# Patient Record
Sex: MALE | Race: WHITE | NOT HISPANIC OR LATINO | Employment: OTHER | URBAN - METROPOLITAN AREA
[De-identification: names, ages, dates, MRNs, and addresses within clinical notes are randomized per-mention and may not be internally consistent; named-entity substitution may affect disease eponyms.]

---

## 2017-01-10 DIAGNOSIS — E78.5 HYPERLIPIDEMIA, UNSPECIFIED HYPERLIPIDEMIA TYPE: ICD-10-CM

## 2017-01-10 DIAGNOSIS — I10 BENIGN ESSENTIAL HYPERTENSION: Primary | ICD-10-CM

## 2017-01-10 DIAGNOSIS — R73.9 HYPERGLYCEMIA: ICD-10-CM

## 2017-01-13 LAB
ALBUMIN SERPL-MCNC: 4.6 G/DL (ref 3.5–5.2)
ALBUMIN/GLOB SERPL: 2 G/DL
ALP SERPL-CCNC: 71 U/L (ref 39–117)
ALT SERPL-CCNC: 31 U/L (ref 1–41)
AST SERPL-CCNC: 21 U/L (ref 1–40)
BILIRUB SERPL-MCNC: 0.5 MG/DL (ref 0.1–1.2)
BUN SERPL-MCNC: 13 MG/DL (ref 8–23)
BUN/CREAT SERPL: 13.4 (ref 7–25)
CALCIUM SERPL-MCNC: 9.5 MG/DL (ref 8.6–10.5)
CHLORIDE SERPL-SCNC: 102 MMOL/L (ref 98–107)
CHOLEST SERPL-MCNC: 124 MG/DL (ref 0–200)
CO2 SERPL-SCNC: 28.6 MMOL/L (ref 22–29)
CREAT SERPL-MCNC: 0.97 MG/DL (ref 0.76–1.27)
GLOBULIN SER CALC-MCNC: 2.3 GM/DL
GLUCOSE SERPL-MCNC: 113 MG/DL (ref 65–99)
HBA1C MFR BLD: 5.4 % (ref 4.8–5.6)
HDLC SERPL-MCNC: 26 MG/DL (ref 40–60)
LDLC SERPL CALC-MCNC: 67 MG/DL (ref 0–100)
LDLC/HDLC SERPL: 2.58 {RATIO}
POTASSIUM SERPL-SCNC: 4.3 MMOL/L (ref 3.5–5.2)
PROT SERPL-MCNC: 6.9 G/DL (ref 6–8.5)
SODIUM SERPL-SCNC: 142 MMOL/L (ref 136–145)
TRIGL SERPL-MCNC: 154 MG/DL (ref 0–150)
VLDLC SERPL CALC-MCNC: 30.8 MG/DL (ref 5–40)

## 2017-01-19 ENCOUNTER — OFFICE VISIT (OUTPATIENT)
Dept: FAMILY MEDICINE CLINIC | Facility: CLINIC | Age: 62
End: 2017-01-19

## 2017-01-19 VITALS
DIASTOLIC BLOOD PRESSURE: 90 MMHG | RESPIRATION RATE: 16 BRPM | HEART RATE: 80 BPM | BODY MASS INDEX: 28.88 KG/M2 | OXYGEN SATURATION: 98 % | WEIGHT: 225 LBS | TEMPERATURE: 98 F | HEIGHT: 74 IN | SYSTOLIC BLOOD PRESSURE: 148 MMHG

## 2017-01-19 DIAGNOSIS — I10 BENIGN ESSENTIAL HYPERTENSION: Primary | ICD-10-CM

## 2017-01-19 DIAGNOSIS — E78.5 HYPERLIPIDEMIA, UNSPECIFIED HYPERLIPIDEMIA TYPE: ICD-10-CM

## 2017-01-19 PROCEDURE — 99213 OFFICE O/P EST LOW 20 MIN: CPT | Performed by: INTERNAL MEDICINE

## 2017-02-02 RX ORDER — AMLODIPINE BESYLATE 5 MG/1
5 TABLET ORAL DAILY
Qty: 90 TABLET | Refills: 1 | Status: SHIPPED | OUTPATIENT
Start: 2017-02-02 | End: 2018-02-17 | Stop reason: SDUPTHER

## 2017-02-02 RX ORDER — METOPROLOL SUCCINATE 50 MG/1
50 TABLET, EXTENDED RELEASE ORAL DAILY
Qty: 90 TABLET | Refills: 1 | Status: SHIPPED | OUTPATIENT
Start: 2017-02-02 | End: 2017-10-17 | Stop reason: SDUPTHER

## 2017-02-02 RX ORDER — SIMVASTATIN 40 MG
40 TABLET ORAL NIGHTLY
Qty: 90 TABLET | Refills: 1 | Status: SHIPPED | OUTPATIENT
Start: 2017-02-02 | End: 2018-02-17 | Stop reason: SDUPTHER

## 2017-02-02 RX ORDER — LISINOPRIL 10 MG/1
10 TABLET ORAL DAILY
Qty: 90 TABLET | Refills: 1 | Status: SHIPPED | OUTPATIENT
Start: 2017-02-02 | End: 2017-05-05 | Stop reason: SDUPTHER

## 2017-03-01 NOTE — PROGRESS NOTES
Subjective   Jacob Miller is a 61 y.o. male. Patient is here today for   Chief Complaint   Patient presents with   • Hypertension     lab f/u   • Hyperlipidemia          Vitals:    01/19/17 1550   BP: 148/90   Pulse: 80   Resp: 16   Temp: 98 °F (36.7 °C)   SpO2: 98%       Past Medical History   Diagnosis Date   • Hyperlipidemia    • Hypertension       No Known Allergies   Social History     Social History   • Marital status:      Spouse name: N/A   • Number of children: N/A   • Years of education: N/A     Occupational History   • Not on file.     Social History Main Topics   • Smoking status: Never Smoker   • Smokeless tobacco: Not on file   • Alcohol use Yes   • Drug use: Not on file   • Sexual activity: Not on file     Other Topics Concern   • Not on file     Social History Narrative        Current Outpatient Prescriptions:   •  amLODIPine (NORVASC) 5 MG tablet, Take 1 tablet by mouth Daily., Disp: 90 tablet, Rfl: 1  •  lisinopril (PRINIVIL,ZESTRIL) 10 MG tablet, Take 1 tablet by mouth Daily., Disp: 90 tablet, Rfl: 1  •  metoprolol succinate XL (TOPROL-XL) 50 MG 24 hr tablet, Take 1 tablet by mouth Daily., Disp: 90 tablet, Rfl: 1  •  simvastatin (ZOCOR) 40 MG tablet, Take 1 tablet by mouth Every Night., Disp: 90 tablet, Rfl: 1     Objective     HPI Comments: He is here to follow-up with hypertension and hypercholesterolemia.    He claims to feel well.    Hypertension     Hyperlipidemia          Review of Systems   Constitutional: Negative.    HENT: Negative.    Respiratory: Negative.    Cardiovascular: Negative.    Psychiatric/Behavioral: Negative.        Physical Exam   Constitutional: He is oriented to person, place, and time. He appears well-developed and well-nourished.   HENT:   Head: Normocephalic and atraumatic.   Pulmonary/Chest: Effort normal.   Neurological: He is alert and oriented to person, place, and time.   Psychiatric: He has a normal mood and affect. His behavior is normal.   Nursing  note and vitals reviewed.        Problem List Items Addressed This Visit        Cardiovascular and Mediastinum    Benign essential hypertension - Primary    Hyperlipidemia            PLAN  His hypercholesterolemia.  BP fairly well-controlled.  He does have a little bit of an elevation in his triglycerides and he is a higher than normal fasting glucose.  He is obese.  Of urged him to do his best to lose weight via regular aerobic activity decreased caloric intake.    I would like recheck some lab work in about 6 months to include a conference metabolic panel, hemoglobin A 1C, urinalysis, lipid profile, he should get a PSA checked once yearly for hasn't been checked in the past year.      Return in about 2 months (around 3/19/2017).

## 2017-03-16 ENCOUNTER — OFFICE VISIT (OUTPATIENT)
Dept: FAMILY MEDICINE CLINIC | Facility: CLINIC | Age: 62
End: 2017-03-16

## 2017-03-16 VITALS
RESPIRATION RATE: 16 BRPM | WEIGHT: 225 LBS | HEIGHT: 74 IN | BODY MASS INDEX: 28.88 KG/M2 | SYSTOLIC BLOOD PRESSURE: 148 MMHG | HEART RATE: 80 BPM | TEMPERATURE: 98.2 F | OXYGEN SATURATION: 98 % | DIASTOLIC BLOOD PRESSURE: 84 MMHG

## 2017-03-16 DIAGNOSIS — I10 BENIGN ESSENTIAL HYPERTENSION: Primary | ICD-10-CM

## 2017-03-16 PROCEDURE — 99213 OFFICE O/P EST LOW 20 MIN: CPT | Performed by: INTERNAL MEDICINE

## 2017-03-16 NOTE — PROGRESS NOTES
Subjective   Jacob Miller is a 61 y.o. male. Patient is here today for   Chief Complaint   Patient presents with   • Hypertension     2 month f/u          Vitals:    03/16/17 1007   BP: 148/84   Pulse: 80   Resp: 16   Temp: 98.2 °F (36.8 °C)   SpO2: 98%       Past Medical History   Diagnosis Date   • Hyperlipidemia    • Hypertension       No Known Allergies   Social History     Social History   • Marital status:      Spouse name: N/A   • Number of children: N/A   • Years of education: N/A     Occupational History   • Not on file.     Social History Main Topics   • Smoking status: Never Smoker   • Smokeless tobacco: Not on file   • Alcohol use Yes   • Drug use: Not on file   • Sexual activity: Not on file     Other Topics Concern   • Not on file     Social History Narrative        Current Outpatient Prescriptions:   •  amLODIPine (NORVASC) 5 MG tablet, Take 1 tablet by mouth Daily., Disp: 90 tablet, Rfl: 1  •  lisinopril (PRINIVIL,ZESTRIL) 10 MG tablet, Take 1 tablet by mouth Daily., Disp: 90 tablet, Rfl: 1  •  metoprolol succinate XL (TOPROL-XL) 50 MG 24 hr tablet, Take 1 tablet by mouth Daily., Disp: 90 tablet, Rfl: 1  •  simvastatin (ZOCOR) 40 MG tablet, Take 1 tablet by mouth Every Night., Disp: 90 tablet, Rfl: 1     Objective     HPI Comments: He is here to follow-up on hypertension.    His current medications include amlodipine 2.5 mg once daily, lisinopril 20 mg once daily, metoprolol XL 50 mg once daily.    He has obstructive sleep apnea and is compliant with use of CPAP.    He claims to feel well.    Hypertension          Review of Systems   Constitutional: Negative.    HENT: Negative.    Respiratory: Negative.    Cardiovascular: Negative.    Genitourinary: Negative.    Hematological: Negative.        Physical Exam   Constitutional: He is oriented to person, place, and time.   HENT:   Head: Normocephalic and atraumatic.   Pulmonary/Chest: Effort normal.   Neurological: He is alert and oriented to  person, place, and time.   Psychiatric: He has a normal mood and affect. His behavior is normal.   Nursing note and vitals reviewed.        Problem List Items Addressed This Visit        Cardiovascular and Mediastinum    Benign essential hypertension - Primary            PLAN  His hypertension needs to be a bit better controlled.  I asked him to increase his amlodipine to 5 mg daily.    He will continue to take lisinopril 20 mg daily.    Follow-up in one or 2 months for blood pressure check.        I asked him to follow-up in about 6 months for a comprehensive physical exam.      No Follow-up on file.

## 2017-04-17 ENCOUNTER — OFFICE VISIT (OUTPATIENT)
Dept: FAMILY MEDICINE CLINIC | Facility: CLINIC | Age: 62
End: 2017-04-17

## 2017-04-17 VITALS
HEART RATE: 70 BPM | HEIGHT: 74 IN | OXYGEN SATURATION: 99 % | DIASTOLIC BLOOD PRESSURE: 72 MMHG | RESPIRATION RATE: 16 BRPM | SYSTOLIC BLOOD PRESSURE: 132 MMHG | BODY MASS INDEX: 29.26 KG/M2 | WEIGHT: 228 LBS

## 2017-04-17 DIAGNOSIS — I10 BENIGN ESSENTIAL HYPERTENSION: Primary | ICD-10-CM

## 2017-04-17 PROCEDURE — 99213 OFFICE O/P EST LOW 20 MIN: CPT | Performed by: INTERNAL MEDICINE

## 2017-05-05 RX ORDER — LISINOPRIL 20 MG/1
20 TABLET ORAL DAILY
Qty: 90 TABLET | Refills: 1 | Status: SHIPPED | OUTPATIENT
Start: 2017-05-05 | End: 2017-06-20 | Stop reason: SDUPTHER

## 2017-06-07 DIAGNOSIS — Z11.59 NEED FOR HEPATITIS C SCREENING TEST: ICD-10-CM

## 2017-06-07 DIAGNOSIS — Z12.5 ENCOUNTER FOR SCREENING FOR MALIGNANT NEOPLASM OF PROSTATE: ICD-10-CM

## 2017-06-07 DIAGNOSIS — Z00.00 ROUTINE HEALTH MAINTENANCE: Primary | ICD-10-CM

## 2017-06-07 DIAGNOSIS — R73.9 HYPERGLYCEMIA: ICD-10-CM

## 2017-06-13 ENCOUNTER — CLINICAL SUPPORT (OUTPATIENT)
Dept: FAMILY MEDICINE CLINIC | Facility: CLINIC | Age: 62
End: 2017-06-13

## 2017-06-13 DIAGNOSIS — Z00.00 ROUTINE HEALTH MAINTENANCE: Primary | ICD-10-CM

## 2017-06-13 PROCEDURE — 85025 COMPLETE CBC W/AUTO DIFF WBC: CPT | Performed by: INTERNAL MEDICINE

## 2017-06-13 PROCEDURE — 71020 XR CHEST PA AND LATERAL: CPT | Performed by: INTERNAL MEDICINE

## 2017-06-13 PROCEDURE — 93000 ELECTROCARDIOGRAM COMPLETE: CPT | Performed by: INTERNAL MEDICINE

## 2017-06-14 LAB
ALBUMIN SERPL-MCNC: 4.4 G/DL (ref 3.6–4.8)
ALBUMIN/GLOB SERPL: 1.8 {RATIO} (ref 1.2–2.2)
ALP SERPL-CCNC: 70 IU/L (ref 39–117)
ALT SERPL-CCNC: 33 IU/L (ref 0–44)
APPEARANCE UR: CLEAR
AST SERPL-CCNC: 23 IU/L (ref 0–40)
BILIRUB SERPL-MCNC: 0.6 MG/DL (ref 0–1.2)
BILIRUB UR QL STRIP: NEGATIVE
BUN SERPL-MCNC: 15 MG/DL (ref 8–27)
BUN/CREAT SERPL: 17 (ref 10–24)
CALCIUM SERPL-MCNC: 9.3 MG/DL (ref 8.6–10.2)
CHLORIDE SERPL-SCNC: 100 MMOL/L (ref 96–106)
CHOLEST SERPL-MCNC: 123 MG/DL (ref 100–199)
CO2 SERPL-SCNC: 25 MMOL/L (ref 18–29)
COLOR UR: YELLOW
CREAT SERPL-MCNC: 0.9 MG/DL (ref 0.76–1.27)
GLOBULIN SER CALC-MCNC: 2.5 G/DL (ref 1.5–4.5)
GLUCOSE SERPL-MCNC: 100 MG/DL (ref 65–99)
GLUCOSE UR QL: NEGATIVE
HBA1C MFR BLD: 5.8 % (ref 4.8–5.6)
HCV AB S/CO SERPL IA: <0.1 S/CO RATIO (ref 0–0.9)
HCV AB SERPL QL IA: NORMAL
HDLC SERPL-MCNC: 27 MG/DL
HGB UR QL STRIP: NEGATIVE
KETONES UR QL STRIP: NEGATIVE
LDLC SERPL CALC-MCNC: 62 MG/DL (ref 0–99)
LDLC/HDLC SERPL: 2.3 RATIO UNITS (ref 0–3.6)
LEUKOCYTE ESTERASE UR QL STRIP: NEGATIVE
MICRO URNS: ABNORMAL
NITRITE UR QL STRIP: NEGATIVE
PH UR STRIP: 8 [PH] (ref 5–7.5)
POTASSIUM SERPL-SCNC: 3.8 MMOL/L (ref 3.5–5.2)
PROT SERPL-MCNC: 6.9 G/DL (ref 6–8.5)
PROT UR QL STRIP: ABNORMAL
PSA SERPL-MCNC: 1.7 NG/ML (ref 0–4)
SODIUM SERPL-SCNC: 140 MMOL/L (ref 134–144)
SP GR UR: 1.02 (ref 1–1.03)
TRIGL SERPL-MCNC: 171 MG/DL (ref 0–149)
UROBILINOGEN UR STRIP-MCNC: 0.2 MG/DL (ref 0.2–1)
VLDLC SERPL CALC-MCNC: 34 MG/DL (ref 5–40)

## 2017-06-20 ENCOUNTER — OFFICE VISIT (OUTPATIENT)
Dept: FAMILY MEDICINE CLINIC | Facility: CLINIC | Age: 62
End: 2017-06-20

## 2017-06-20 VITALS
DIASTOLIC BLOOD PRESSURE: 80 MMHG | RESPIRATION RATE: 16 BRPM | SYSTOLIC BLOOD PRESSURE: 130 MMHG | BODY MASS INDEX: 29.26 KG/M2 | OXYGEN SATURATION: 98 % | WEIGHT: 228 LBS | HEIGHT: 74 IN | HEART RATE: 65 BPM | TEMPERATURE: 98 F

## 2017-06-20 DIAGNOSIS — I10 BENIGN ESSENTIAL HYPERTENSION: ICD-10-CM

## 2017-06-20 DIAGNOSIS — E78.5 HYPERLIPIDEMIA, UNSPECIFIED HYPERLIPIDEMIA TYPE: ICD-10-CM

## 2017-06-20 DIAGNOSIS — N40.0 BENIGN NON-NODULAR PROSTATIC HYPERPLASIA WITHOUT LOWER URINARY TRACT SYMPTOMS: ICD-10-CM

## 2017-06-20 DIAGNOSIS — Z00.00 WELL ADULT EXAM: ICD-10-CM

## 2017-06-20 DIAGNOSIS — Z12.11 SCREEN FOR COLON CANCER: Primary | ICD-10-CM

## 2017-06-20 DIAGNOSIS — R73.9 HYPERGLYCEMIA: ICD-10-CM

## 2017-06-20 DIAGNOSIS — G47.33 OBSTRUCTIVE SLEEP APNEA: ICD-10-CM

## 2017-06-20 LAB — HEMOCCULT STL QL IA: NEGATIVE

## 2017-06-20 PROCEDURE — 82274 ASSAY TEST FOR BLOOD FECAL: CPT | Performed by: INTERNAL MEDICINE

## 2017-06-20 PROCEDURE — 99396 PREV VISIT EST AGE 40-64: CPT | Performed by: INTERNAL MEDICINE

## 2017-06-20 PROCEDURE — 71020 XR CHEST PA AND LATERAL: CPT | Performed by: INTERNAL MEDICINE

## 2017-06-20 RX ORDER — LISINOPRIL 20 MG/1
20 TABLET ORAL DAILY
Qty: 90 TABLET | Refills: 1 | Status: SHIPPED | OUTPATIENT
Start: 2017-06-20 | End: 2018-09-18 | Stop reason: SDUPTHER

## 2017-06-20 NOTE — PROGRESS NOTES
Subjective   Jacob Miller is a 61 y.o. male. Patient is here today for   Chief Complaint   Patient presents with   • Annual Exam     physical           Vitals:    06/20/17 1046   BP: 130/80   Pulse: 65   Resp: 16   Temp: 98 °F (36.7 °C)   SpO2: 98%       The following portions of the patient's history were reviewed and updated as appropriate: allergies, current medications, past family history, past medical history, past social history, past surgical history and problem list.    Past Medical History:   Diagnosis Date   • Hyperlipidemia    • Hypertension       No Known Allergies   Social History     Social History   • Marital status:      Spouse name: N/A   • Number of children: N/A   • Years of education: N/A     Occupational History   • Not on file.     Social History Main Topics   • Smoking status: Never Smoker   • Smokeless tobacco: Not on file   • Alcohol use Yes   • Drug use: Not on file   • Sexual activity: Not on file     Other Topics Concern   • Not on file     Social History Narrative        Current Outpatient Prescriptions:   •  amLODIPine (NORVASC) 5 MG tablet, Take 1 tablet by mouth Daily., Disp: 90 tablet, Rfl: 1  •  metoprolol succinate XL (TOPROL-XL) 50 MG 24 hr tablet, Take 1 tablet by mouth Daily., Disp: 90 tablet, Rfl: 1  •  simvastatin (ZOCOR) 40 MG tablet, Take 1 tablet by mouth Every Night., Disp: 90 tablet, Rfl: 1  •  lisinopril (PRINIVIL,ZESTRIL) 20 MG tablet, Take 1 tablet by mouth Daily., Disp: 90 tablet, Rfl: 1     EKG  ECG Report  His electrocardiogram showed normal sinus rhythm with regular rate.  There were no ST or T wave changes suggestive of ischemia.    His PA and lateral chest x-ray showed no acute or chronic disease.  Objective   HPI Comments: Here today for a comprehensive physical exam.    He tells me that he feels well.    He has obstructive sleep apnea.  He has been compliant with use of CPAP device since 1998.             Jacob Miller 61 y.o. male who presents  for an Annual Wellness Visit.  he has a history of   Patient Active Problem List   Diagnosis   • Benign essential hypertension   • Hyperlipidemia   • Well adult exam   • Obstructive sleep apnea   • Hyperglycemia   • Actinic keratosis   • Benign non-nodular prostatic hyperplasia without lower urinary tract symptoms   .  he has been doing well with new interval problems.  Labs results discussed in detail with the patient.  Plan to update vaccines if needed today.        Lab Results (most recent)     Procedure Component Value Units Date/Time    POC FECAL OCCULT BLOOD BY IMMUNOASSAY [[id number]]  (Normal) Collected:  06/20/17 1154    Specimen:  Stool Updated:  06/20/17 1155     POC OCCULT BLOOD BY IMMUNOASSAY Negative            Review of Systems   Constitutional: Negative.    HENT: Negative.    Eyes: Negative.    Respiratory: Negative.    Cardiovascular: Negative.    Gastrointestinal: Negative.    Endocrine: Negative.    Genitourinary: Negative.    Musculoskeletal: Negative.    Skin: Negative.    Allergic/Immunologic: Negative.    Neurological: Negative.    Hematological: Negative.    Psychiatric/Behavioral: Negative.        Physical Exam   Constitutional: He is oriented to person, place, and time. He appears well-developed and well-nourished. No distress.   Pleasant, neatly groomed, moderately overweight with BMI 29.   HENT:   Head: Normocephalic and atraumatic.   Right Ear: External ear normal.   Left Ear: External ear normal.   Nose: Nose normal.   Mouth/Throat: Oropharynx is clear and moist. No oropharyngeal exudate.   Eyes: Conjunctivae and EOM are normal. Pupils are equal, round, and reactive to light. Right eye exhibits no discharge. Left eye exhibits no discharge. No scleral icterus.   Neck: Normal range of motion. Neck supple. No JVD present. No tracheal deviation present. No thyromegaly present.   Cardiovascular: Normal rate, regular rhythm, normal heart sounds and intact distal pulses.  Exam reveals no gallop  and no friction rub.    No murmur heard.  Pulmonary/Chest: Effort normal and breath sounds normal. No stridor. No respiratory distress. He has no wheezes. He has no rales. He exhibits no tenderness.   Abdominal: Soft. Bowel sounds are normal. He exhibits no distension and no mass. There is no tenderness. There is no rebound and no guarding. No hernia.   Genitourinary: Rectum normal and penis normal. Rectal exam shows guaiac negative stool. No penile tenderness.   Genitourinary Comments: His prostate is symmetrically enlarged.  There is no induration, there is no nodule.   Musculoskeletal: Normal range of motion. He exhibits no edema, tenderness or deformity.   Lymphadenopathy:     He has no cervical adenopathy.   Neurological: He is alert and oriented to person, place, and time. He has normal reflexes. He displays normal reflexes. No cranial nerve deficit. He exhibits normal muscle tone. Coordination normal.   Skin: Skin is warm and dry. No rash noted. He is not diaphoretic. No erythema. No pallor.   He has some solar lentigines at the temples and on his face.   Psychiatric: He has a normal mood and affect. His behavior is normal. Judgment and thought content normal.   Nursing note and vitals reviewed.      ASSESSMENT       Problem List Items Addressed This Visit        Cardiovascular and Mediastinum    Benign essential hypertension    Hyperlipidemia       Respiratory    Obstructive sleep apnea       Genitourinary    Benign non-nodular prostatic hyperplasia without lower urinary tract symptoms       Other    Well adult exam    Relevant Orders    XR Chest PA & Lateral (Completed)    Hyperglycemia      Other Visit Diagnoses     Screen for colon cancer    -  Primary    Relevant Orders    POC FECAL OCCULT BLOOD BY IMMUNOASSAY (Completed)          PLAN  He and I reviewed his labs.  His hypertension is hypercholesterolemia are well controlled.  Next he has BPH and is asymptomatic.    He is a little hyperglycemic.   Likewise, he is a bit overweight.  Of urged him to do his best to lose weight via regular aerobic exercise (recommended walking), and some moderate caloric restriction.    He does have obstructive sleep apnea as and is compliant with use of CPAP.    I like to have him back in about 6 months to recheck a hemoglobin A1c and a comprehensive metabolic panel and a lipid profile.  There are no Patient Instructions on file for this visit.    No Follow-up on file.

## 2017-10-17 RX ORDER — METOPROLOL SUCCINATE 50 MG/1
TABLET, EXTENDED RELEASE ORAL
Qty: 30 TABLET | Refills: 0 | Status: SHIPPED | OUTPATIENT
Start: 2017-10-17 | End: 2017-11-28 | Stop reason: SDUPTHER

## 2017-11-28 RX ORDER — METOPROLOL SUCCINATE 50 MG/1
TABLET, EXTENDED RELEASE ORAL
Qty: 30 TABLET | Refills: 0 | Status: SHIPPED | OUTPATIENT
Start: 2017-11-28 | End: 2018-01-05 | Stop reason: SDUPTHER

## 2017-12-11 ENCOUNTER — OFFICE VISIT (OUTPATIENT)
Dept: FAMILY MEDICINE CLINIC | Facility: CLINIC | Age: 62
End: 2017-12-11

## 2017-12-11 VITALS
DIASTOLIC BLOOD PRESSURE: 82 MMHG | WEIGHT: 229.6 LBS | TEMPERATURE: 98.4 F | BODY MASS INDEX: 29.46 KG/M2 | HEIGHT: 74 IN | HEART RATE: 83 BPM | OXYGEN SATURATION: 99 % | RESPIRATION RATE: 16 BRPM | SYSTOLIC BLOOD PRESSURE: 168 MMHG

## 2017-12-11 DIAGNOSIS — J06.9 ACUTE URI: Primary | ICD-10-CM

## 2017-12-11 PROCEDURE — 99213 OFFICE O/P EST LOW 20 MIN: CPT | Performed by: NURSE PRACTITIONER

## 2017-12-11 RX ORDER — INFLUENZA A VIRUS A/MICHIGAN/45/2015 X-275 (H1N1) ANTIGEN (FORMALDEHYDE INACTIVATED), INFLUENZA A VIRUS A/SINGAPORE/INFIMH-16-0019/2016 IVR-186 (H3N2) ANTIGEN (FORMALDEHYDE INACTIVATED), INFLUENZA B VIRUS B/PHUKET/3073/2013 ANTIGEN (FORMALDEHYDE INACTIVATED), AND INFLUENZA B VIRUS B/MARYLAND/15/2016 BX-69A ANTIGEN (FORMALDEHYDE INACTIVATED) 15; 15; 15; 15 UG/.5ML; UG/.5ML; UG/.5ML; UG/.5ML
INJECTION, SUSPENSION INTRAMUSCULAR
COMMUNITY
Start: 2017-11-13 | End: 2018-01-11

## 2017-12-11 RX ORDER — PROMETHAZINE HYDROCHLORIDE AND CODEINE PHOSPHATE 6.25; 1 MG/5ML; MG/5ML
5 SYRUP ORAL EVERY 4 HOURS PRN
Qty: 180 ML | Refills: 0 | Status: SHIPPED | OUTPATIENT
Start: 2017-12-11 | End: 2018-01-11

## 2017-12-11 NOTE — PROGRESS NOTES
Subjective   Jacob Miller is a 62 y.o. male.   Chief Complaint   Patient presents with   • Nasal Congestion     pt states been going on for several days 12/08/17    • sneezing   • Cough   • chest congestion     Vitals:    12/11/17 1133   BP: 168/82   Pulse: 83   Resp: 16   Temp: 98.4 °F (36.9 °C)   SpO2: 99%     No LMP for male patient.    History of Present Illness  Jacob is here for an acute visit. He c/o cough, sneezing, sinus pressure, and dry hacking cough for 5 days. He has taken coricedin and mucinex D with some relief. He denies fever, chills or body aches. He reports that his cough is keeping him up at night     The following portions of the patient's history were reviewed and updated as appropriate: allergies, current medications, past family history, past medical history, past social history, past surgical history and problem list.    Review of Systems   Constitutional: Negative for chills, fatigue and fever.   HENT: Positive for congestion, postnasal drip, rhinorrhea, sinus pressure, sneezing and sore throat.    Respiratory: Positive for cough. Negative for apnea, shortness of breath and wheezing.    Cardiovascular: Negative.        Objective   Physical Exam   Constitutional: Vital signs are normal. He appears well-developed and well-nourished. He does not appear ill. No distress.   HENT:   Right Ear: Tympanic membrane and ear canal normal.   Left Ear: Tympanic membrane and ear canal normal.   Nose: Mucosal edema and rhinorrhea present. Right sinus exhibits no maxillary sinus tenderness and no frontal sinus tenderness. Left sinus exhibits no maxillary sinus tenderness and no frontal sinus tenderness.   Mouth/Throat: Uvula is midline. Posterior oropharyngeal erythema present.   Cardiovascular: Normal rate, regular rhythm and normal heart sounds.    Pulmonary/Chest: Effort normal and breath sounds normal.   Neurological: He is alert.   Skin: Skin is warm and dry.       Assessment/Plan   Jacob was  seen today for nasal congestion, sneezing, cough and chest congestion.    Diagnoses and all orders for this visit:    Acute URI    Other orders  -     promethazine-codeine (PHENERGAN with CODEINE) 6.25-10 MG/5ML syrup; Take 5 mL by mouth Every 4 (Four) Hours As Needed for Cough.      Symptom treatment for now   Rest and fluids  Suggest flonase or nasacort for now  Can hold on mucinex if cough is nonproductive and no chest congestion   Tylenol or motrin   Avoid second hand smoke and allergens   No driving while taking promethazine with codeine   Throat lozenges, humidifier, vicks vapor rub as needed  Follow up if your symptoms persist. I asked him to call me if he was no better by Friday and I would call him in a rx for antibiotics  He wants to visit his new grand baby and I asked him to avoid being around them for now until his symptoms resolve

## 2017-12-19 ENCOUNTER — TELEPHONE (OUTPATIENT)
Dept: FAMILY MEDICINE CLINIC | Facility: CLINIC | Age: 62
End: 2017-12-19

## 2017-12-19 RX ORDER — AZITHROMYCIN 250 MG/1
TABLET, FILM COATED ORAL
Qty: 6 TABLET | Refills: 0 | Status: SHIPPED | OUTPATIENT
Start: 2017-12-19 | End: 2018-01-11

## 2017-12-19 NOTE — TELEPHONE ENCOUNTER
Please call him and let him know that I called him in a zpak  Follow up if no better     ----- Message from Salome Mathur sent at 12/19/2017  8:32 AM EST -----  Pt wanting a antibiotic called in still having chest congestion     Pharmacy   [address]     Please call pt with any questions   [phone]

## 2018-01-02 DIAGNOSIS — I10 BENIGN ESSENTIAL HYPERTENSION: Primary | ICD-10-CM

## 2018-01-02 DIAGNOSIS — R73.9 HYPERGLYCEMIA: ICD-10-CM

## 2018-01-02 DIAGNOSIS — E78.5 HYPERLIPIDEMIA, UNSPECIFIED HYPERLIPIDEMIA TYPE: ICD-10-CM

## 2018-01-05 LAB
ALBUMIN SERPL-MCNC: 4.4 G/DL (ref 3.5–5.2)
ALBUMIN/GLOB SERPL: 1.6 G/DL
ALP SERPL-CCNC: 69 U/L (ref 39–117)
ALT SERPL-CCNC: 30 U/L (ref 1–41)
AST SERPL-CCNC: 19 U/L (ref 1–40)
BASOPHILS # BLD AUTO: 0 10*3/MM3 (ref 0–0.2)
BASOPHILS NFR BLD AUTO: 0 % (ref 0–1.5)
BILIRUB SERPL-MCNC: 0.6 MG/DL (ref 0.1–1.2)
BUN SERPL-MCNC: 14 MG/DL (ref 8–23)
BUN/CREAT SERPL: 15.6 (ref 7–25)
CALCIUM SERPL-MCNC: 9.2 MG/DL (ref 8.6–10.5)
CHLORIDE SERPL-SCNC: 103 MMOL/L (ref 98–107)
CHOLEST SERPL-MCNC: 127 MG/DL (ref 0–200)
CO2 SERPL-SCNC: 30.7 MMOL/L (ref 22–29)
CREAT SERPL-MCNC: 0.9 MG/DL (ref 0.76–1.27)
EOSINOPHIL # BLD AUTO: 0.1 10*3/MM3 (ref 0–0.7)
EOSINOPHIL NFR BLD AUTO: 2.5 % (ref 0.3–6.2)
ERYTHROCYTE [DISTWIDTH] IN BLOOD BY AUTOMATED COUNT: 15.4 % (ref 11.5–14.5)
GLOBULIN SER CALC-MCNC: 2.8 GM/DL
GLUCOSE SERPL-MCNC: 105 MG/DL (ref 65–99)
HBA1C MFR BLD: 5.8 % (ref 4.8–5.6)
HCT VFR BLD AUTO: 44 % (ref 40.4–52.2)
HDLC SERPL-MCNC: 29 MG/DL (ref 40–60)
HGB BLD-MCNC: 14.3 G/DL (ref 13.7–17.6)
IMM GRANULOCYTES # BLD: 0 10*3/MM3 (ref 0–0.03)
IMM GRANULOCYTES NFR BLD: 0 % (ref 0–0.5)
LDLC SERPL CALC-MCNC: 70 MG/DL (ref 0–100)
LDLC/HDLC SERPL: 2.41 {RATIO}
LYMPHOCYTES # BLD AUTO: 1.27 10*3/MM3 (ref 0.9–4.8)
LYMPHOCYTES NFR BLD AUTO: 31.4 % (ref 19.6–45.3)
MCH RBC QN AUTO: 29.5 PG (ref 27–32.7)
MCHC RBC AUTO-ENTMCNC: 32.5 G/DL (ref 32.6–36.4)
MCV RBC AUTO: 90.7 FL (ref 79.8–96.2)
MONOCYTES # BLD AUTO: 0.75 10*3/MM3 (ref 0.2–1.2)
MONOCYTES NFR BLD AUTO: 18.5 % (ref 5–12)
NEUTROPHILS # BLD AUTO: 1.93 10*3/MM3 (ref 1.9–8.1)
NEUTROPHILS NFR BLD AUTO: 47.6 % (ref 42.7–76)
PLATELET # BLD AUTO: 196 10*3/MM3 (ref 140–500)
POTASSIUM SERPL-SCNC: 4.4 MMOL/L (ref 3.5–5.2)
PROT SERPL-MCNC: 7.2 G/DL (ref 6–8.5)
RBC # BLD AUTO: 4.85 10*6/MM3 (ref 4.6–6)
SODIUM SERPL-SCNC: 144 MMOL/L (ref 136–145)
TRIGL SERPL-MCNC: 141 MG/DL (ref 0–150)
VLDLC SERPL CALC-MCNC: 28.2 MG/DL (ref 5–40)
WBC # BLD AUTO: 4.05 10*3/MM3 (ref 4.5–10.7)

## 2018-01-05 RX ORDER — METOPROLOL SUCCINATE 50 MG/1
TABLET, EXTENDED RELEASE ORAL
Qty: 30 TABLET | Refills: 11 | Status: SHIPPED | OUTPATIENT
Start: 2018-01-05 | End: 2019-01-14 | Stop reason: SDUPTHER

## 2018-01-11 ENCOUNTER — OFFICE VISIT (OUTPATIENT)
Dept: FAMILY MEDICINE CLINIC | Facility: CLINIC | Age: 63
End: 2018-01-11

## 2018-01-11 VITALS
RESPIRATION RATE: 17 BRPM | DIASTOLIC BLOOD PRESSURE: 80 MMHG | SYSTOLIC BLOOD PRESSURE: 140 MMHG | HEIGHT: 74 IN | WEIGHT: 229 LBS | BODY MASS INDEX: 29.39 KG/M2 | OXYGEN SATURATION: 100 % | HEART RATE: 69 BPM

## 2018-01-11 DIAGNOSIS — M25.562 PAIN IN BOTH KNEES, UNSPECIFIED CHRONICITY: Primary | ICD-10-CM

## 2018-01-11 DIAGNOSIS — E78.5 HYPERLIPIDEMIA, UNSPECIFIED HYPERLIPIDEMIA TYPE: ICD-10-CM

## 2018-01-11 DIAGNOSIS — R73.9 HYPERGLYCEMIA: ICD-10-CM

## 2018-01-11 DIAGNOSIS — M25.561 PAIN IN BOTH KNEES, UNSPECIFIED CHRONICITY: Primary | ICD-10-CM

## 2018-01-11 DIAGNOSIS — I10 BENIGN ESSENTIAL HYPERTENSION: ICD-10-CM

## 2018-01-11 DIAGNOSIS — N40.0 BENIGN NON-NODULAR PROSTATIC HYPERPLASIA WITHOUT LOWER URINARY TRACT SYMPTOMS: ICD-10-CM

## 2018-01-11 DIAGNOSIS — M17.0 PRIMARY OSTEOARTHRITIS OF BOTH KNEES: ICD-10-CM

## 2018-01-11 PROCEDURE — 99214 OFFICE O/P EST MOD 30 MIN: CPT | Performed by: INTERNAL MEDICINE

## 2018-01-11 RX ORDER — DICLOFENAC SODIUM 75 MG/1
75 TABLET, DELAYED RELEASE ORAL 2 TIMES DAILY PRN
Qty: 60 TABLET | Refills: 2 | Status: SHIPPED | OUTPATIENT
Start: 2018-01-11 | End: 2018-09-18 | Stop reason: SDUPTHER

## 2018-01-11 RX ORDER — TOLTERODINE 2 MG/1
2 CAPSULE, EXTENDED RELEASE ORAL DAILY
Qty: 30 CAPSULE | Refills: 1 | Status: SHIPPED | OUTPATIENT
Start: 2018-01-11 | End: 2018-07-26

## 2018-01-14 PROBLEM — M17.0 OSTEOARTHRITIS OF BOTH KNEES: Status: ACTIVE | Noted: 2018-01-14

## 2018-01-14 NOTE — PROGRESS NOTES
Subjective   Jacob Miller is a 62 y.o. male. Patient is here today for   Chief Complaint   Patient presents with   • Hypertension   • Knee Pain     left   • Urine Leakage          Vitals:    01/11/18 1006   BP: 140/80   Pulse: 69   Resp: 17   SpO2: 100%       Past Medical History:   Diagnosis Date   • Hyperlipidemia    • Hypertension       No Known Allergies   Social History     Social History   • Marital status:      Spouse name: N/A   • Number of children: N/A   • Years of education: N/A     Occupational History   • Not on file.     Social History Main Topics   • Smoking status: Never Smoker   • Smokeless tobacco: Never Used   • Alcohol use Yes   • Drug use: Not on file   • Sexual activity: Not on file     Other Topics Concern   • Not on file     Social History Narrative        Current Outpatient Prescriptions:   •  amLODIPine (NORVASC) 5 MG tablet, Take 1 tablet by mouth Daily., Disp: 90 tablet, Rfl: 1  •  diclofenac (VOLTAREN) 75 MG EC tablet, Take 1 tablet by mouth 2 (Two) Times a Day As Needed (pain)., Disp: 60 tablet, Rfl: 2  •  lisinopril (PRINIVIL,ZESTRIL) 20 MG tablet, Take 1 tablet by mouth Daily., Disp: 90 tablet, Rfl: 1  •  metoprolol succinate XL (TOPROL-XL) 50 MG 24 hr tablet, TAKE ONE TABLET BY MOUTH DAILY, Disp: 30 tablet, Rfl: 11  •  simvastatin (ZOCOR) 40 MG tablet, Take 1 tablet by mouth Every Night., Disp: 90 tablet, Rfl: 1  •  tolterodine LA (DETROL LA) 2 MG 24 hr capsule, Take 1 capsule by mouth Daily., Disp: 30 capsule, Rfl: 1     Objective     HPI Comments: He had lab work done today which he wishes to review.    He is here to follow-up on his hypertension.    Also, he complained of bilateral knee pain but the right knee her more than the left.  The pain is concentrated towards the inside of the knee.  He notes no recent traumatic event.    He notes urinary urgency which occasionally creates some urine leakage.  He denies any dysuria.  He denies any obstructive urinary tract  symptoms.  He does not that he has to get up 2-3 times at night to urinate.        Hypertension     Knee Pain           Review of Systems   Constitutional: Negative.    HENT: Negative.    Respiratory: Negative.    Cardiovascular: Negative.    Genitourinary:        He notes occasional urinary urgency which is quite severe.  Infrequently causes him to have some urine leakage.   Musculoskeletal:        He complains of bilateral knee pain slowly getting worse over the last several years the left hurts worse than the right.    He notes no preceding traumatic event.   Psychiatric/Behavioral: Negative.        Physical Exam   Constitutional: He is oriented to person, place, and time. He appears well-developed and well-nourished. No distress.   Pleasant, neatly groomed, BMI 29.   HENT:   Head: Normocephalic and atraumatic.   Pulmonary/Chest: Effort normal.   Musculoskeletal:   I couldn't detect crepitus in either knee.  There was no effusion was some tenderness on palpation of the medial left knee.  There appear to be a full range of motion.   Neurological: He is alert and oriented to person, place, and time.   Psychiatric: He has a normal mood and affect. His behavior is normal.   Nursing note and vitals reviewed.    X-ray of the left knee shows some mild degenerative changes worse medially to laterally.    Problem List Items Addressed This Visit        Cardiovascular and Mediastinum    Benign essential hypertension    Hypercholesterolemia       Musculoskeletal and Integument    Osteoarthritis of both knees       Genitourinary    Benign non-nodular prostatic hyperplasia without lower urinary tract symptoms       Other    Hyperglycemia      Other Visit Diagnoses     Pain in both knees, unspecified chronicity    -  Primary    Relevant Orders    XR Knee 1 or 2 View Left (In Office)    XR Knee 1 or 2 View Right (In Office)            PLAN  His hypertension is well-controlled.    His hypercholesterolemia is  well-controlled.    He has osteoarthritis in both of his knees.  Thinning of the joint space in the left knee is demonstrated on left knee films.  I described diclofenac 75 mg 1 by mouth twice a day for him.  A like him to take this medication every day for a week or 2 and then when necessary.  He will let me know whether or not this makes some meaningful difference for him.  He and I discussed the importance of weight loss and regular aerobic activity to improve knee pain.  If his pain is no better, he will let me know, and I will refer him to see orthopedic surgery regarding potential treatment with intra-articular injection of Synvisc, or steroid.    Hyperglycemia is persistent.      He is moderately overweight with a BMI of 29.  I've encouraged weight loss.  Regular aerobic activity decreased caloric intake.    Regarding his urgency.  He might benefit from Detrol LA.  I feel his symptoms are most consistent with bladder spasm.  He will it me know if his symptoms fail to improve.  I did caution him that this medication could cause a dry mouth.    Follow-up for comprehensive physical examination once yearly.  Also, I would like to have him back to check his labs regarding hyperglycemia, hypercholesterolemia once every 6 months.  No Follow-up on file.

## 2018-02-19 RX ORDER — AMLODIPINE BESYLATE 5 MG/1
TABLET ORAL
Qty: 30 TABLET | Refills: 3 | Status: SHIPPED | OUTPATIENT
Start: 2018-02-19 | End: 2018-07-18 | Stop reason: SDUPTHER

## 2018-02-19 RX ORDER — SIMVASTATIN 40 MG
TABLET ORAL
Qty: 30 TABLET | Refills: 3 | Status: SHIPPED | OUTPATIENT
Start: 2018-02-19 | End: 2019-04-04 | Stop reason: SDUPTHER

## 2018-05-08 RX ORDER — LISINOPRIL 20 MG/1
TABLET ORAL
Qty: 30 TABLET | Refills: 2 | Status: SHIPPED | OUTPATIENT
Start: 2018-05-08 | End: 2018-08-26 | Stop reason: SDUPTHER

## 2018-06-19 DIAGNOSIS — E78.00 HYPERCHOLESTEREMIA: ICD-10-CM

## 2018-06-19 DIAGNOSIS — E78.5 HYPERLIPIDEMIA, UNSPECIFIED HYPERLIPIDEMIA TYPE: ICD-10-CM

## 2018-06-19 DIAGNOSIS — Z79.899 LONG TERM USE OF DRUG: Primary | ICD-10-CM

## 2018-07-11 LAB
ALBUMIN SERPL-MCNC: 4.7 G/DL (ref 3.5–5.2)
ALBUMIN/GLOB SERPL: 2 G/DL
ALP SERPL-CCNC: 70 U/L (ref 39–117)
ALT SERPL-CCNC: 35 U/L (ref 1–41)
AST SERPL-CCNC: 21 U/L (ref 1–40)
BILIRUB SERPL-MCNC: 0.5 MG/DL (ref 0.1–1.2)
BUN SERPL-MCNC: 12 MG/DL (ref 8–23)
BUN/CREAT SERPL: 12 (ref 7–25)
CALCIUM SERPL-MCNC: 9.6 MG/DL (ref 8.6–10.5)
CHLORIDE SERPL-SCNC: 103 MMOL/L (ref 98–107)
CHOLEST SERPL-MCNC: 116 MG/DL (ref 0–200)
CO2 SERPL-SCNC: 27.7 MMOL/L (ref 22–29)
CREAT SERPL-MCNC: 1 MG/DL (ref 0.76–1.27)
GLOBULIN SER CALC-MCNC: 2.3 GM/DL
GLUCOSE SERPL-MCNC: 109 MG/DL (ref 65–99)
HBA1C MFR BLD: 5.7 % (ref 4.8–5.6)
HDLC SERPL-MCNC: 28 MG/DL (ref 40–60)
LDLC SERPL CALC-MCNC: 58 MG/DL (ref 0–100)
LDLC/HDLC SERPL: 2.08 {RATIO}
POTASSIUM SERPL-SCNC: 4.3 MMOL/L (ref 3.5–5.2)
PROT SERPL-MCNC: 7 G/DL (ref 6–8.5)
SODIUM SERPL-SCNC: 144 MMOL/L (ref 136–145)
TRIGL SERPL-MCNC: 149 MG/DL (ref 0–150)
VLDLC SERPL CALC-MCNC: 29.8 MG/DL (ref 5–40)

## 2018-07-19 RX ORDER — AMLODIPINE BESYLATE 5 MG/1
TABLET ORAL
Qty: 30 TABLET | Refills: 2 | Status: SHIPPED | OUTPATIENT
Start: 2018-07-19 | End: 2018-11-08 | Stop reason: SDUPTHER

## 2018-07-26 ENCOUNTER — OFFICE VISIT (OUTPATIENT)
Dept: FAMILY MEDICINE CLINIC | Facility: CLINIC | Age: 63
End: 2018-07-26

## 2018-07-26 VITALS
HEART RATE: 64 BPM | DIASTOLIC BLOOD PRESSURE: 80 MMHG | OXYGEN SATURATION: 98 % | WEIGHT: 224.8 LBS | SYSTOLIC BLOOD PRESSURE: 160 MMHG | RESPIRATION RATE: 16 BRPM | HEIGHT: 74 IN | TEMPERATURE: 98.2 F | BODY MASS INDEX: 28.85 KG/M2

## 2018-07-26 DIAGNOSIS — G47.33 OBSTRUCTIVE SLEEP APNEA: ICD-10-CM

## 2018-07-26 DIAGNOSIS — M75.22 BICEPS TENDINITIS ON LEFT: ICD-10-CM

## 2018-07-26 DIAGNOSIS — R00.2 PALPITATIONS: Primary | ICD-10-CM

## 2018-07-26 DIAGNOSIS — E78.00 HYPERCHOLESTEROLEMIA: ICD-10-CM

## 2018-07-26 DIAGNOSIS — I10 BENIGN ESSENTIAL HYPERTENSION: ICD-10-CM

## 2018-07-26 DIAGNOSIS — Z12.5 SCREENING PSA (PROSTATE SPECIFIC ANTIGEN): ICD-10-CM

## 2018-07-26 LAB — TSH SERPL DL<=0.005 MIU/L-ACNC: 0.97 MIU/ML (ref 0.27–4.2)

## 2018-07-26 PROCEDURE — 99214 OFFICE O/P EST MOD 30 MIN: CPT | Performed by: INTERNAL MEDICINE

## 2018-07-26 PROCEDURE — 93000 ELECTROCARDIOGRAM COMPLETE: CPT | Performed by: INTERNAL MEDICINE

## 2018-07-26 RX ORDER — HEPATITIS A VACCINE 1440 [IU]/ML
INJECTION, SUSPENSION INTRAMUSCULAR
COMMUNITY
Start: 2018-06-25 | End: 2019-03-06

## 2018-07-26 NOTE — PROGRESS NOTES
Subjective   Jacob Miller is a 63 y.o. male. Patient is here today for   Chief Complaint   Patient presents with   • Follow-up     hypercholesterolemia           Vitals:    07/26/18 0846   BP: 160/80   Pulse: 64   Resp: 16   Temp: 98.2 °F (36.8 °C)   SpO2: 98%       Past Medical History:   Diagnosis Date   • Hyperlipidemia    • Hypertension       No Known Allergies   Social History     Social History   • Marital status:      Spouse name: N/A   • Number of children: N/A   • Years of education: N/A     Occupational History   • Not on file.     Social History Main Topics   • Smoking status: Never Smoker   • Smokeless tobacco: Never Used   • Alcohol use Yes   • Drug use: Unknown   • Sexual activity: Not on file     Other Topics Concern   • Not on file     Social History Narrative   • No narrative on file        Current Outpatient Prescriptions:   •  amLODIPine (NORVASC) 5 MG tablet, TAKE ONE TABLET BY MOUTH DAILY, Disp: 30 tablet, Rfl: 2  •  diclofenac (VOLTAREN) 75 MG EC tablet, Take 1 tablet by mouth 2 (Two) Times a Day As Needed (pain)., Disp: 60 tablet, Rfl: 2  •  HAVRIX 1440 EL U/ML vaccine, , Disp: , Rfl:   •  lisinopril (PRINIVIL,ZESTRIL) 20 MG tablet, Take 1 tablet by mouth Daily., Disp: 90 tablet, Rfl: 1  •  lisinopril (PRINIVIL,ZESTRIL) 20 MG tablet, TAKE ONE TABLET BY MOUTH DAILY, Disp: 30 tablet, Rfl: 2  •  metoprolol succinate XL (TOPROL-XL) 50 MG 24 hr tablet, TAKE ONE TABLET BY MOUTH DAILY, Disp: 30 tablet, Rfl: 11  •  simvastatin (ZOCOR) 40 MG tablet, TAKE ONE TABLET BY MOUTH ONCE NIGHTLY, Disp: 30 tablet, Rfl: 3     Objective     He notes palpitations lasting about 15 minutes at a time , a couple times a day over the last couple months.  He denies any associated chest pain, dyspnea etc.    He has cut back on caffeine consumption since the symptoms began.    He tells me that his previous physician had started him on a low-dose of metoprolol for the same complaints years ago.    He is concerned  that he had some swelling in his left pectoral region which is painless and is been going on for couple months.    He is here to review lab work done a couple weeks ago regarding his hypercholesterolemia and hypertension.    He has obstructive sleep apnea and is compliant with use of CPAP.             Review of Systems   Constitutional: Negative.    HENT: Negative.    Respiratory: Negative.    Cardiovascular: Positive for palpitations. Negative for chest pain.   Musculoskeletal:        He has noticed some swelling in his left pectoral region.    He noticed some tenderness in his left bicep tendon which is been going on for a couple months.  It waxes and wanes.  Sometimes he forgets about it.  It is bothering him today.   Psychiatric/Behavioral: Negative.        Physical Exam   Constitutional: He is oriented to person, place, and time. He appears well-developed and well-nourished.   HENT:   Head: Normocephalic and atraumatic.   Cardiovascular: Normal rate, regular rhythm and normal heart sounds.    No murmur heard.  Pulmonary/Chest: Effort normal and breath sounds normal.   Musculoskeletal:   I could detect no mass in his left pectoral region or no adenopathy in his left axillary region.    His chest appear to be symmetrical.   Neurological: He is alert and oriented to person, place, and time.   Psychiatric: He has a normal mood and affect. His behavior is normal.   Nursing note and vitals reviewed.        Problem List Items Addressed This Visit        Cardiovascular and Mediastinum    Benign essential hypertension    Hypercholesterolemia    Palpitations - Primary    Relevant Orders    Holter monitor - 48 hour    TSH       Respiratory    Obstructive sleep apnea       Musculoskeletal and Integument    Biceps tendinitis on left      Other Visit Diagnoses     Screening PSA (prostate specific antigen)        Relevant Orders    PSA SCREENING            PLAN  Electrocardiogram shows a sinus rhythm with a rate of 62.  I'm  going to check a TSH.  Like to 48 hour Holter monitor and have ordered that today.    He is due for PSA screening.  His last PSA was done a year ago this past June.  I'll arrange that today.    His hypertension is well-controlled.    His hypercholesterolemia is well-controlled.    He has obstructive sleep apnea and is compliant with use of CPAP.    I asked him to let me know if he notices any change in the swelling he had noted in his left pectoral region.    I asked him to take an anti-inflammatory when necessary for his left bicep tendinitis.  He will let me know if this fails to resolve.  No Follow-up on file.

## 2018-07-31 ENCOUNTER — RESULTS ENCOUNTER (OUTPATIENT)
Dept: FAMILY MEDICINE CLINIC | Facility: CLINIC | Age: 63
End: 2018-07-31

## 2018-07-31 DIAGNOSIS — Z12.5 SCREENING PSA (PROSTATE SPECIFIC ANTIGEN): ICD-10-CM

## 2018-08-03 ENCOUNTER — TELEPHONE (OUTPATIENT)
Dept: FAMILY MEDICINE CLINIC | Facility: CLINIC | Age: 63
End: 2018-08-03

## 2018-08-03 DIAGNOSIS — I49.1 PREMATURE ATRIAL CONTRACTIONS: Primary | ICD-10-CM

## 2018-08-03 NOTE — TELEPHONE ENCOUNTER
ATTEMPTED TO CALL PT AND ADVISED HIM PER DR. GUERRA FREQUENT PREMATURE ATRIAL CONTRACTIONS WHERE SHOWN ON HIS HOLTER MONITOR AND DR. GUERRA WOULD LIKE HIM TO GO SEE A CARDIOLOGIST FOR FURTHER EVALUATION.   ----- Message from Margareth Aden sent at 8/2/2018 11:42 AM EDT -----  Contact: pt  [phone]  Would like results of holter he turned in Mon

## 2018-08-27 RX ORDER — LISINOPRIL 20 MG/1
TABLET ORAL
Qty: 30 TABLET | Refills: 5 | Status: SHIPPED | OUTPATIENT
Start: 2018-08-27 | End: 2019-04-04 | Stop reason: SDUPTHER

## 2018-09-18 ENCOUNTER — OFFICE VISIT (OUTPATIENT)
Dept: CARDIOLOGY | Facility: CLINIC | Age: 63
End: 2018-09-18

## 2018-09-18 VITALS
HEIGHT: 74 IN | DIASTOLIC BLOOD PRESSURE: 84 MMHG | HEART RATE: 64 BPM | SYSTOLIC BLOOD PRESSURE: 124 MMHG | BODY MASS INDEX: 28.93 KG/M2 | WEIGHT: 225.4 LBS

## 2018-09-18 DIAGNOSIS — E78.2 MIXED HYPERLIPIDEMIA: ICD-10-CM

## 2018-09-18 DIAGNOSIS — I10 BENIGN ESSENTIAL HYPERTENSION: ICD-10-CM

## 2018-09-18 DIAGNOSIS — I49.3 PVC (PREMATURE VENTRICULAR CONTRACTION): Primary | ICD-10-CM

## 2018-09-18 DIAGNOSIS — I49.1 APC (ATRIAL PREMATURE CONTRACTIONS): ICD-10-CM

## 2018-09-18 DIAGNOSIS — G47.33 OBSTRUCTIVE SLEEP APNEA: ICD-10-CM

## 2018-09-18 PROCEDURE — 99204 OFFICE O/P NEW MOD 45 MIN: CPT | Performed by: INTERNAL MEDICINE

## 2018-09-18 PROCEDURE — 93000 ELECTROCARDIOGRAM COMPLETE: CPT | Performed by: INTERNAL MEDICINE

## 2018-09-18 RX ORDER — DICLOFENAC SODIUM 75 MG/1
75 TABLET, DELAYED RELEASE ORAL 2 TIMES DAILY
COMMUNITY
End: 2019-12-17 | Stop reason: SDUPTHER

## 2018-09-18 NOTE — PROGRESS NOTES
Date of Office Visit: 18  Encounter Provider: Ozzy Posada MD  Place of Service: [facility] CARDIOLOGY  Patient Name: Jacob Millre  :1955  Referral Provider:Erik Benitez MD      Chief Complaint   Patient presents with   • Palpitations     History of Present Illness  Mr Miller is a pleasant 64 yo gentleman with history of hypertension, hyperlipidemia, obstructive sleep apnea, and PACs.  In the past probably over 10 years ago he was having episodes of palpitations and a Holter monitor done that showed premature ventricular beats no complex ectopy and that was in .  But then about 4 months ago he started having these increased episodes of palpitations that would last a little longer in the past they were just skipped beats but now he would have this kind of racing occurred last number from a few seconds to up to a few hours.  He was getting him every day but now they're back down to maybe 2-3 times a week and again can occur off and on throughout the day.  He denied any associated dizziness or lightheadedness.  No near-syncope or syncope.  No chest pain or pressure no shortness of breath, orthopnea, or PND.  They tend to occur at rest when he is active he doesn't really notice them they apparently get a little bit worse with stress, caffeine, and alcohol.  Denies history of rheumatic fever, heart attack, heart failure, and heart murmur.          Past Medical History:   Diagnosis Date   • Hyperlipidemia    • Hypertension    • Kidney stones    • PAC (premature atrial contraction)    • Palpitations    • Sleep apnea     USES C-PAP MACHINE         Past Surgical History:   Procedure Laterality Date   • HERNIA REPAIR     • ROTATOR CUFF REPAIR           Current Outpatient Prescriptions on File Prior to Visit   Medication Sig Dispense Refill   • amLODIPine (NORVASC) 5 MG tablet TAKE ONE TABLET BY MOUTH DAILY 30 tablet 2   • HAVRIX 1440 EL U/ML vaccine      •  lisinopril (PRINIVIL,ZESTRIL) 20 MG tablet TAKE ONE TABLET BY MOUTH DAILY 30 tablet 5   • metoprolol succinate XL (TOPROL-XL) 50 MG 24 hr tablet TAKE ONE TABLET BY MOUTH DAILY 30 tablet 11   • simvastatin (ZOCOR) 40 MG tablet TAKE ONE TABLET BY MOUTH ONCE NIGHTLY (Patient taking differently: TAKE ONE TABLET BY MOUTH THREE TIMES A WEEK) 30 tablet 3   • [DISCONTINUED] diclofenac (VOLTAREN) 75 MG EC tablet Take 1 tablet by mouth 2 (Two) Times a Day As Needed (pain). 60 tablet 2   • [DISCONTINUED] lisinopril (PRINIVIL,ZESTRIL) 20 MG tablet Take 1 tablet by mouth Daily. 90 tablet 1     No current facility-administered medications on file prior to visit.          Social History     Social History   • Marital status:      Spouse name: N/A   • Number of children: N/A   • Years of education: N/A     Occupational History   • Not on file.     Social History Main Topics   • Smoking status: Never Smoker   • Smokeless tobacco: Never Used      Comment: Daily caffeine use   • Alcohol use Yes      Comment: occ   • Drug use: No   • Sexual activity: Not on file     Other Topics Concern   • Not on file     Social History Narrative   • No narrative on file       Family History   Problem Relation Age of Onset   • Heart disease Mother    • Hypertension Mother    • Diabetes Mother    • Heart disease Father    • Arrhythmia Brother          Review of Systems   Constitution: Negative for decreased appetite, diaphoresis, fever, weakness, malaise/fatigue, weight gain and weight loss.   HENT: Negative for congestion, hearing loss, nosebleeds and tinnitus.    Eyes: Negative for blurred vision, double vision, vision loss in left eye, vision loss in right eye and visual disturbance.   Cardiovascular:        As noted in HPI   Respiratory:        As noted HPI   Endocrine: Negative for cold intolerance and heat intolerance.   Hematologic/Lymphatic: Negative for bleeding problem. Does not bruise/bleed easily.   Skin: Negative for color change,  "flushing, itching and rash.   Musculoskeletal: Negative for arthritis, back pain, joint pain, joint swelling, muscle weakness and myalgias.   Gastrointestinal: Negative for bloating, abdominal pain, constipation, diarrhea, dysphagia, heartburn, hematemesis, hematochezia, melena, nausea and vomiting.   Genitourinary: Positive for frequency. Negative for bladder incontinence, dysuria, nocturia and urgency.   Neurological: Negative for dizziness, focal weakness, headaches, light-headedness, loss of balance, numbness, paresthesias and vertigo.   Psychiatric/Behavioral: Negative for depression, memory loss and substance abuse.       Procedures      ECG 12 Lead  Date/Time: 9/18/2018 9:43 AM  Performed by: ELINA MURPHY  Authorized by: ELINA MURPHY   Comparison: compared with previous ECG   Similar to previous ECG  Rhythm: sinus rhythm  Rate: normal  QRS axis: normal                  Objective:    /84 (BP Location: Left arm)   Pulse 64   Ht 188 cm (74\")   Wt 102 kg (225 lb 6.4 oz)   BMI 28.94 kg/m²        Physical Exam  Physical Exam   Constitutional: He is oriented to person, place, and time. He appears well-developed and well-nourished. No distress.   HENT:   Head: Normocephalic.   Eyes: Pupils are equal, round, and reactive to light. Conjunctivae are normal. No scleral icterus.   Neck: Normal carotid pulses, no hepatojugular reflux and no JVD present. Carotid bruit is not present. No tracheal deviation, no edema and no erythema present. No thyromegaly present.   Cardiovascular: Normal rate, regular rhythm, S1 normal, S2 normal, normal heart sounds and intact distal pulses.   No extrasystoles are present. PMI is not displaced.  Exam reveals no gallop, no distant heart sounds and no friction rub.    No murmur heard.  Pulses:       Carotid pulses are 2+ on the right side, and 2+ on the left side.       Radial pulses are 2+ on the right side, and 2+ on the left side.        Femoral pulses are 2+ on the " right side, and 2+ on the left side.       Dorsalis pedis pulses are 2+ on the right side, and 2+ on the left side.        Posterior tibial pulses are 2+ on the right side, and 2+ on the left side.   Pulmonary/Chest: Effort normal and breath sounds normal. No respiratory distress. He has no decreased breath sounds. He has no wheezes. He has no rhonchi. He has no rales. He exhibits no tenderness.   Abdominal: Soft. Bowel sounds are normal. He exhibits no distension and no mass. There is no hepatosplenomegaly. There is no tenderness. There is no rebound and no guarding.   Musculoskeletal: He exhibits no edema, tenderness or deformity.   Neurological: He is alert and oriented to person, place, and time.   Skin: Skin is warm and dry. No rash noted. He is not diaphoretic. No cyanosis or erythema. No pallor. Nails show no clubbing.   Psychiatric: He has a normal mood and affect. His speech is normal and behavior is normal. Judgment and thought content normal.           Assessment:   1.  63 year old gentleman with palpitations history of premature ventricular beats.  Now with premature atrial beats noted on his monitor.  I think these are benign I would like to have him do an echocardiogram to rule out structural heart disease if this is unremarkable would not treat any further in less increased symptoms and at that time would increase his metoprolol.  He should take an 81 mg coated aspirin daily.  Did tell him if he had a particularly bad day of any contrite taking Nexium metoprolol that day.  Again physical cardiac unremarkable we'll continue the above plan.  2.  Hypertension blood pressure appears to be at goal continue the same.  3.  Hyperlipidemia on target dose atorvastatin his last LDL was 54.  4.  History of obstructive sleep apnea on CPAP and tolerating that.          Plan:

## 2018-09-26 ENCOUNTER — HOSPITAL ENCOUNTER (OUTPATIENT)
Dept: CARDIOLOGY | Facility: HOSPITAL | Age: 63
Discharge: HOME | End: 2018-09-26
Attending: INTERNAL MEDICINE | Admitting: INTERNAL MEDICINE

## 2018-09-26 VITALS
SYSTOLIC BLOOD PRESSURE: 160 MMHG | BODY MASS INDEX: 28.88 KG/M2 | HEIGHT: 74 IN | DIASTOLIC BLOOD PRESSURE: 90 MMHG | HEART RATE: 70 BPM | WEIGHT: 225 LBS

## 2018-09-26 PROCEDURE — 93306 TTE W/DOPPLER COMPLETE: CPT | Performed by: INTERNAL MEDICINE

## 2018-09-26 PROCEDURE — 93306 TTE W/DOPPLER COMPLETE: CPT

## 2018-09-27 ENCOUNTER — TELEPHONE (OUTPATIENT)
Dept: CARDIOLOGY | Facility: CLINIC | Age: 63
End: 2018-09-27

## 2018-09-27 LAB
ASCENDING AORTA: 3.4 CM
BH CV ECHO MEAS - ACS: 2 CM
BH CV ECHO MEAS - AO MAX PG (FULL): 4.2 MMHG
BH CV ECHO MEAS - AO MAX PG: 9.2 MMHG
BH CV ECHO MEAS - AO MEAN PG (FULL): 2.9 MMHG
BH CV ECHO MEAS - AO MEAN PG: 5.3 MMHG
BH CV ECHO MEAS - AO ROOT AREA (BSA CORRECTED): 1.7
BH CV ECHO MEAS - AO ROOT AREA: 11.7 CM^2
BH CV ECHO MEAS - AO ROOT DIAM: 3.9 CM
BH CV ECHO MEAS - AO V2 MAX: 151.3 CM/SEC
BH CV ECHO MEAS - AO V2 MEAN: 109.7 CM/SEC
BH CV ECHO MEAS - AO V2 VTI: 36.7 CM
BH CV ECHO MEAS - AVA(I,A): 2.3 CM^2
BH CV ECHO MEAS - AVA(I,D): 2.3 CM^2
BH CV ECHO MEAS - AVA(V,A): 2.6 CM^2
BH CV ECHO MEAS - AVA(V,D): 2.6 CM^2
BH CV ECHO MEAS - BSA(HAYCOCK): 2.3 M^2
BH CV ECHO MEAS - BSA: 2.3 M^2
BH CV ECHO MEAS - BZI_BMI: 28.9 KILOGRAMS/M^2
BH CV ECHO MEAS - BZI_METRIC_HEIGHT: 188 CM
BH CV ECHO MEAS - BZI_METRIC_WEIGHT: 102.1 KG
BH CV ECHO MEAS - EDV(MOD-SP2): 139 ML
BH CV ECHO MEAS - EDV(MOD-SP4): 144 ML
BH CV ECHO MEAS - EDV(TEICH): 107.1 ML
BH CV ECHO MEAS - EF(CUBED): 83.5 %
BH CV ECHO MEAS - EF(MOD-BP): 61 %
BH CV ECHO MEAS - EF(MOD-SP2): 61.9 %
BH CV ECHO MEAS - EF(MOD-SP4): 58.3 %
BH CV ECHO MEAS - EF(TEICH): 76.4 %
BH CV ECHO MEAS - ESV(MOD-SP2): 53 ML
BH CV ECHO MEAS - ESV(MOD-SP4): 60 ML
BH CV ECHO MEAS - ESV(TEICH): 25.3 ML
BH CV ECHO MEAS - FS: 45.1 %
BH CV ECHO MEAS - IVS/LVPW: 0.81
BH CV ECHO MEAS - IVSD: 0.96 CM
BH CV ECHO MEAS - LAT PEAK E' VEL: 12 CM/SEC
BH CV ECHO MEAS - LV DIASTOLIC VOL/BSA (35-75): 63 ML/M^2
BH CV ECHO MEAS - LV MASS(C)D: 188.9 GRAMS
BH CV ECHO MEAS - LV MASS(C)DI: 82.7 GRAMS/M^2
BH CV ECHO MEAS - LV MAX PG: 4.9 MMHG
BH CV ECHO MEAS - LV MEAN PG: 2.4 MMHG
BH CV ECHO MEAS - LV SYSTOLIC VOL/BSA (12-30): 26.3 ML/M^2
BH CV ECHO MEAS - LV V1 MAX: 111.1 CM/SEC
BH CV ECHO MEAS - LV V1 MEAN: 70.5 CM/SEC
BH CV ECHO MEAS - LV V1 VTI: 23.8 CM
BH CV ECHO MEAS - LVIDD: 4.8 CM
BH CV ECHO MEAS - LVIDS: 2.6 CM
BH CV ECHO MEAS - LVLD AP2: 9.2 CM
BH CV ECHO MEAS - LVLD AP4: 9.3 CM
BH CV ECHO MEAS - LVLS AP2: 8.2 CM
BH CV ECHO MEAS - LVLS AP4: 7.7 CM
BH CV ECHO MEAS - LVOT AREA (M): 3.5 CM^2
BH CV ECHO MEAS - LVOT AREA: 3.5 CM^2
BH CV ECHO MEAS - LVOT DIAM: 2.1 CM
BH CV ECHO MEAS - LVPWD: 1.2 CM
BH CV ECHO MEAS - MED PEAK E' VEL: 8 CM/SEC
BH CV ECHO MEAS - MR MAX PG: 27.4 MMHG
BH CV ECHO MEAS - MR MAX VEL: 261.8 CM/SEC
BH CV ECHO MEAS - MV A DUR: 0.13 SEC
BH CV ECHO MEAS - MV A MAX VEL: 73.7 CM/SEC
BH CV ECHO MEAS - MV DEC SLOPE: 391.9 CM/SEC^2
BH CV ECHO MEAS - MV DEC TIME: 0.21 SEC
BH CV ECHO MEAS - MV E MAX VEL: 81.5 CM/SEC
BH CV ECHO MEAS - MV E/A: 1.1
BH CV ECHO MEAS - MV MAX PG: 2.9 MMHG
BH CV ECHO MEAS - MV MEAN PG: 1.1 MMHG
BH CV ECHO MEAS - MV P1/2T MAX VEL: 81.5 CM/SEC
BH CV ECHO MEAS - MV P1/2T: 60.9 MSEC
BH CV ECHO MEAS - MV V2 MAX: 84.8 CM/SEC
BH CV ECHO MEAS - MV V2 MEAN: 49.6 CM/SEC
BH CV ECHO MEAS - MV V2 VTI: 23.9 CM
BH CV ECHO MEAS - MVA P1/2T LCG: 2.7 CM^2
BH CV ECHO MEAS - MVA(P1/2T): 3.6 CM^2
BH CV ECHO MEAS - MVA(VTI): 3.5 CM^2
BH CV ECHO MEAS - PA ACC TIME: 0.16 SEC
BH CV ECHO MEAS - PA MAX PG (FULL): 5.8 MMHG
BH CV ECHO MEAS - PA MAX PG: 7.3 MMHG
BH CV ECHO MEAS - PA PR(ACCEL): 6.1 MMHG
BH CV ECHO MEAS - PA V2 MAX: 134.7 CM/SEC
BH CV ECHO MEAS - PULM A REVS DUR: 0.12 SEC
BH CV ECHO MEAS - PULM A REVS VEL: 36.5 CM/SEC
BH CV ECHO MEAS - PULM DIAS VEL: 88.8 CM/SEC
BH CV ECHO MEAS - PULM S/D: 1.1
BH CV ECHO MEAS - PULM SYS VEL: 98.2 CM/SEC
BH CV ECHO MEAS - PVA(V,A): 1.4 CM^2
BH CV ECHO MEAS - PVA(V,D): 1.4 CM^2
BH CV ECHO MEAS - QP/QS: 0.55
BH CV ECHO MEAS - RAP SYSTOLE: 3 MMHG
BH CV ECHO MEAS - RV MAX PG: 1.5 MMHG
BH CV ECHO MEAS - RV MEAN PG: 0.86 MMHG
BH CV ECHO MEAS - RV V1 MAX: 60.6 CM/SEC
BH CV ECHO MEAS - RV V1 MEAN: 43.7 CM/SEC
BH CV ECHO MEAS - RV V1 VTI: 15.1 CM
BH CV ECHO MEAS - RVOT AREA: 3.1 CM^2
BH CV ECHO MEAS - RVOT DIAM: 2 CM
BH CV ECHO MEAS - RVSP: 29 MMHG
BH CV ECHO MEAS - SI(AO): 188 ML/M^2
BH CV ECHO MEAS - SI(CUBED): 40.2 ML/M^2
BH CV ECHO MEAS - SI(LVOT): 36.9 ML/M^2
BH CV ECHO MEAS - SI(MOD-SP2): 37.6 ML/M^2
BH CV ECHO MEAS - SI(MOD-SP4): 36.8 ML/M^2
BH CV ECHO MEAS - SI(TEICH): 35.8 ML/M^2
BH CV ECHO MEAS - SUP REN AO DIAM: 2.2 CM
BH CV ECHO MEAS - SV(AO): 429.6 ML
BH CV ECHO MEAS - SV(CUBED): 91.8 ML
BH CV ECHO MEAS - SV(LVOT): 84.3 ML
BH CV ECHO MEAS - SV(MOD-SP2): 86 ML
BH CV ECHO MEAS - SV(MOD-SP4): 84 ML
BH CV ECHO MEAS - SV(RVOT): 46.2 ML
BH CV ECHO MEAS - SV(TEICH): 81.8 ML
BH CV ECHO MEAS - TAPSE (>1.6): 2.9 CM2
BH CV ECHO MEAS - TR MAX VEL: 253.1 CM/SEC
BH CV ECHO MEASUREMENTS AVERAGE E/E' RATIO: 8.15
BH CV XLRA - RV BASE: 3.3 CM
BH CV XLRA - TDI S': 16 CM/SEC
LEFT ATRIUM VOLUME INDEX: 31 ML/M2
SINUS: 3.7 CM
STJ: 2.8 CM

## 2018-09-27 NOTE — TELEPHONE ENCOUNTER
Jacob Miller  Male, 63 y.o., 1955  PCP:   Erik Benitez MD  Language:   English  Need Interp:   No  Last Weight:   102 kg (225 lb)  Phone:   M: [phone] H: [phone] W: [phone]  Allergies  No Known Allergies  Health Maintenance:   Due  FYI:   None  Primary Ins.:   [employer]  MRN:   [id number]  MyChart:   Active  Pharmacy:   [address] [phone] [facility] [phone] FX [[id number]]  Preferred Lab:   None  Next Appt with Me:   None  Next Appt Date by Dept:   01/22/2019        PACS Images     Radiology Images   Adult Transthoracic Echo Complete W/ Cont if Necessary Per Protocol   Order: [id number]   Status:  Final result   Visible to patient:  No (Not Released) Dx:  PVC (premature ventricular contractio...   Details     Reading Physician Reading Date Result Priority   Ozzy Posada MD 9/27/2018 Routine   Result Text   ·   Left ventricular systolic function is normal. Calculated EF = 61%. Estimated EF was in agreement with the calculated EF. Normal left ventricular cavity size and wall thickness noted. All left ventricular wall segments contract normally.  · Left ventricular diastolic function is normal.  · Mild tricuspid valve regurgitation is present. Estimated right ventricular systolic pressure from tricuspid regurgitation is normal (<35 mmHg).            All Measurements                                                                                                                                                                                                                                                                                                                                                                                                                                                                                                                                                                                                                                [facility] OUTPATIENT ECHOCARDIOGRAPHY  [address]  [address]  [facility] [address] [phone]      Study Description     A two-dimensional transthoracic echocardiogram with color flow and Doppler was performed. The study is technically good for diagnosis.   Echocardiogram Findings     Left Ventricle Left ventricular systolic function is normal. Calculated EF = 61%. Estimated EF was in agreement with the calculated EF. Normal left ventricular cavity size and wall thickness noted. All left ventricular wall segments contract normally. Left ventricular diastolic function is normal.      Right Ventricle Normal right ventricular cavity size, wall thickness, systolic function and septal motion noted.      Left Atrium Normal left atrial size and volume noted.      Right Atrium Normal right atrial size noted.      Aortic Valve The aortic valve is structurally normal. No aortic valve regurgitation is present. No aortic valve stenosis is present.      Mitral Valve The mitral valve is normal in structure. No mitral valve regurgitation is present. No significant mitral valve stenosis is present.      Tricuspid Valve The tricuspid valve is normal. No tricuspid valve stenosis is present. Mild tricuspid valve regurgitation is present. Estimated right ventricular systolic pressure from tricuspid regurgitation is normal (<35 mmHg). Calculated right ventricular systolic pressure from tricuspid regurgitation is 29 mmHg.      Pulmonic Valve The pulmonic valve is structurally normal. There is no significant pulmonic valve stenosis present. There is no pulmonic valve regurgitation present.      Greater Vessels No dilation of the aortic root is present. No dilation of the sinuses of Valsalva is present. No dilation of the proximal aorta is present. No dilation of the ascending aorta is present. No dilation of the aortic arch is present. No dilation of the  descending aorta present. The inferior vena cava is normally sized. Normal IVC inspiratory collapse of greater than 50% noted.      Pericardium The pericardium is normal. There is no evidence of pericardial effusion.      Wall Scoring     Score Index: 1.000 Percent Normal: 100.0%             The left ventricular wall motion is normal.               PACS Images     Show images for Adult Transthoracic Echo Complete W/ Cont if Necessary Per Protocol         Specimen Collected: 09/26/18 16:06 Last Resulted: 09/27/18 08:53                Status of Other Orders     Order  Lab Status Result Date Provider Status   ECG 12 Lead Final result 9/18/2018 Open   SCANNED EKG Final result 9/18/2018 Open          Encounter Notes      All notes         Routing History     Priority Sent On From To Message Type    9/27/2018  8:57 AM Ozzy Posada MD Imburgia, Michael, MD Results    9/27/2018  8:57 AM Ozzy Posada MD Gaba, Charles E., MD Results

## 2018-11-09 RX ORDER — AMLODIPINE BESYLATE 5 MG/1
TABLET ORAL
Qty: 30 TABLET | Refills: 0 | Status: SHIPPED | OUTPATIENT
Start: 2018-11-09 | End: 2018-12-12 | Stop reason: SDUPTHER

## 2018-11-12 NOTE — PROGRESS NOTES
Cardiopulmonary Rehab Nursing Entry: 
 
Pt is on CHF Bundle List 
 
Chart reviewed and pt visited for home CHF instruction. Pt is a 77 yo admitted with acute respiratory failure, PNA, CHF, rapid afib, hyponatremia, metastatic melanoma and adrenal insufficiency. Cardiac Rehab: Living with Heart Failure Booklet given to Jess Linda. Met with pt, wife and adult child at bedside. Educated using teach back method. Discussed diagnosis definition and assessed patient understanding. Reviewed importance of daily weight monitoring and Low Sodium diet (1642-8086 mg. daily). Encouraged activity and rest periods within symptom limitations and as ordered by physician. Reviewed lasix, purpose of medication, potential side effects, compliance, and what to do if dose is missed. Discussed importance of reporting signs and symptoms of exacerbation, and when to report them to the doctor, to prevent re-hospitalization. Jess Linda was encouraged to keep all appointments with doctor. Smoking history assessed. Patient is a former smoker. .  
 
Pt reports that he likes the taste of salt, does not care for the substitutes. He reports eating a variety of fresh fruits and vegetables and rotisserie chicken. He adheres to his medications, is physically active as tolerated. Reports remission of CA at the present time and off chemo. He has a scale at home to comply with daily weight assessments. Jess Mckeon denied additional questions or concerns. Subjective   Jacob Miller is a 61 y.o. male. Patient is here today for   Chief Complaint   Patient presents with   • Hypertension     BP CHECK           Vitals:    04/17/17 0812   BP: 132/72   Pulse: 70   Resp: 16   SpO2: 99%       Past Medical History:   Diagnosis Date   • Hyperlipidemia    • Hypertension       No Known Allergies   Social History     Social History   • Marital status:      Spouse name: N/A   • Number of children: N/A   • Years of education: N/A     Occupational History   • Not on file.     Social History Main Topics   • Smoking status: Never Smoker   • Smokeless tobacco: Not on file   • Alcohol use Yes   • Drug use: Not on file   • Sexual activity: Not on file     Other Topics Concern   • Not on file     Social History Narrative        Current Outpatient Prescriptions:   •  amLODIPine (NORVASC) 5 MG tablet, Take 1 tablet by mouth Daily., Disp: 90 tablet, Rfl: 1  •  lisinopril (PRINIVIL,ZESTRIL) 10 MG tablet, Take 1 tablet by mouth Daily., Disp: 90 tablet, Rfl: 1  •  metoprolol succinate XL (TOPROL-XL) 50 MG 24 hr tablet, Take 1 tablet by mouth Daily., Disp: 90 tablet, Rfl: 1  •  simvastatin (ZOCOR) 40 MG tablet, Take 1 tablet by mouth Every Night., Disp: 90 tablet, Rfl: 1     Objective     HPI Comments: He is here to follow-up with hypertension.  He claims to feel well.    Hypertension          Review of Systems   Constitutional: Negative.    HENT: Negative.    Respiratory: Negative.    Cardiovascular: Negative.    Musculoskeletal: Negative.    Neurological: Negative.    Psychiatric/Behavioral: Negative.        Physical Exam   Constitutional: He is oriented to person, place, and time. He appears well-developed and well-nourished.   HENT:   Head: Normocephalic.   Right Ear: External ear normal.   Pulmonary/Chest: Effort normal and breath sounds normal.   Neurological: He is alert and oriented to person, place, and time.   Psychiatric: He has a normal mood and affect. His behavior is  normal. Thought content normal.   Nursing note and vitals reviewed.        Problem List Items Addressed This Visit        Cardiovascular and Mediastinum    Benign essential hypertension - Primary            PLAN  His hypertension is well-controlled.  I asked him to follow-up for a comprehensive physical examination once yearly.  No Follow-up on file.

## 2018-12-12 RX ORDER — AMLODIPINE BESYLATE 5 MG/1
TABLET ORAL
Qty: 30 TABLET | Refills: 2 | Status: SHIPPED | OUTPATIENT
Start: 2018-12-12 | End: 2019-04-04 | Stop reason: SDUPTHER

## 2019-01-14 RX ORDER — METOPROLOL SUCCINATE 50 MG/1
TABLET, EXTENDED RELEASE ORAL
Qty: 30 TABLET | Refills: 2 | Status: SHIPPED | OUTPATIENT
Start: 2019-01-14 | End: 2019-05-06 | Stop reason: SDUPTHER

## 2019-01-28 DIAGNOSIS — E78.00 HYPERCHOLESTEROLEMIA: Primary | ICD-10-CM

## 2019-01-28 DIAGNOSIS — R73.9 HYPERGLYCEMIA: ICD-10-CM

## 2019-01-30 LAB
ALBUMIN SERPL-MCNC: 4.6 G/DL (ref 3.6–4.8)
ALBUMIN/GLOB SERPL: 1.9 {RATIO} (ref 1.2–2.2)
ALP SERPL-CCNC: 66 IU/L (ref 39–117)
ALT SERPL-CCNC: 44 IU/L (ref 0–44)
APPEARANCE UR: CLEAR
AST SERPL-CCNC: 22 IU/L (ref 0–40)
BACTERIA #/AREA URNS HPF: NORMAL /[HPF]
BASOPHILS # BLD AUTO: 0 X10E3/UL (ref 0–0.2)
BASOPHILS NFR BLD AUTO: 0 %
BILIRUB SERPL-MCNC: 0.6 MG/DL (ref 0–1.2)
BILIRUB UR QL STRIP: NEGATIVE
BUN SERPL-MCNC: 14 MG/DL (ref 8–27)
BUN/CREAT SERPL: 14 (ref 10–24)
CALCIUM SERPL-MCNC: 9.1 MG/DL (ref 8.6–10.2)
CHLORIDE SERPL-SCNC: 103 MMOL/L (ref 96–106)
CHOLEST SERPL-MCNC: 148 MG/DL (ref 100–199)
CO2 SERPL-SCNC: 26 MMOL/L (ref 20–29)
COLOR UR: YELLOW
CREAT SERPL-MCNC: 0.99 MG/DL (ref 0.76–1.27)
EOSINOPHIL # BLD AUTO: 0.1 X10E3/UL (ref 0–0.4)
EOSINOPHIL NFR BLD AUTO: 2 %
EPI CELLS #/AREA URNS HPF: NORMAL /HPF
ERYTHROCYTE [DISTWIDTH] IN BLOOD BY AUTOMATED COUNT: 15 % (ref 12.3–15.4)
GLOBULIN SER CALC-MCNC: 2.4 G/DL (ref 1.5–4.5)
GLUCOSE SERPL-MCNC: 112 MG/DL (ref 65–99)
GLUCOSE UR QL: NEGATIVE
HBA1C MFR BLD: 5.8 % (ref 4.8–5.6)
HCT VFR BLD AUTO: 43.1 % (ref 37.5–51)
HDLC SERPL-MCNC: 27 MG/DL
HGB BLD-MCNC: 14.3 G/DL (ref 13–17.7)
HGB UR QL STRIP: NEGATIVE
IMM GRANULOCYTES # BLD AUTO: 0 X10E3/UL (ref 0–0.1)
IMM GRANULOCYTES NFR BLD AUTO: 0 %
KETONES UR QL STRIP: NEGATIVE
LDLC SERPL CALC-MCNC: 83 MG/DL (ref 0–99)
LDLC/HDLC SERPL: 3.1 RATIO (ref 0–3.6)
LEUKOCYTE ESTERASE UR QL STRIP: NEGATIVE
LYMPHOCYTES # BLD AUTO: 1.1 X10E3/UL (ref 0.7–3.1)
LYMPHOCYTES NFR BLD AUTO: 30 %
MCH RBC QN AUTO: 29.4 PG (ref 26.6–33)
MCHC RBC AUTO-ENTMCNC: 33.2 G/DL (ref 31.5–35.7)
MCV RBC AUTO: 89 FL (ref 79–97)
MICRO URNS: (no result)
MICRO URNS: (no result)
MONOCYTES # BLD AUTO: 0.7 X10E3/UL (ref 0.1–0.9)
MONOCYTES NFR BLD AUTO: 19 %
MUCOUS THREADS URNS QL MICRO: PRESENT
NEUTROPHILS # BLD AUTO: 1.8 X10E3/UL (ref 1.4–7)
NEUTROPHILS NFR BLD AUTO: 49 %
NITRITE UR QL STRIP: NEGATIVE
PH UR STRIP: 6.5 [PH] (ref 5–7.5)
PLATELET # BLD AUTO: 174 X10E3/UL (ref 150–379)
POTASSIUM SERPL-SCNC: 3.9 MMOL/L (ref 3.5–5.2)
PROT SERPL-MCNC: 7 G/DL (ref 6–8.5)
PROT UR QL STRIP: NEGATIVE
RBC # BLD AUTO: 4.87 X10E6/UL (ref 4.14–5.8)
RBC #/AREA URNS HPF: NORMAL /HPF
SODIUM SERPL-SCNC: 142 MMOL/L (ref 134–144)
SP GR UR: 1.01 (ref 1–1.03)
TRIGL SERPL-MCNC: 189 MG/DL (ref 0–149)
UROBILINOGEN UR STRIP-MCNC: 0.2 MG/DL (ref 0.2–1)
VLDLC SERPL CALC-MCNC: 38 MG/DL (ref 5–40)
WBC # BLD AUTO: 3.7 X10E3/UL (ref 3.4–10.8)
WBC #/AREA URNS HPF: NORMAL /HPF

## 2019-03-06 ENCOUNTER — OFFICE VISIT (OUTPATIENT)
Dept: FAMILY MEDICINE CLINIC | Facility: CLINIC | Age: 64
End: 2019-03-06

## 2019-03-06 VITALS
SYSTOLIC BLOOD PRESSURE: 118 MMHG | RESPIRATION RATE: 18 BRPM | DIASTOLIC BLOOD PRESSURE: 70 MMHG | BODY MASS INDEX: 29.26 KG/M2 | HEART RATE: 65 BPM | HEIGHT: 74 IN | WEIGHT: 228 LBS

## 2019-03-06 DIAGNOSIS — I10 BENIGN ESSENTIAL HYPERTENSION: ICD-10-CM

## 2019-03-06 DIAGNOSIS — E66.3 OVERWEIGHT (BMI 25.0-29.9): ICD-10-CM

## 2019-03-06 DIAGNOSIS — E78.00 HYPERCHOLESTEROLEMIA: ICD-10-CM

## 2019-03-06 DIAGNOSIS — R73.9 HYPERGLYCEMIA: ICD-10-CM

## 2019-03-06 DIAGNOSIS — G47.33 OBSTRUCTIVE SLEEP APNEA: Primary | ICD-10-CM

## 2019-03-06 PROCEDURE — 99214 OFFICE O/P EST MOD 30 MIN: CPT | Performed by: INTERNAL MEDICINE

## 2019-03-10 PROBLEM — E66.3 OVERWEIGHT (BMI 25.0-29.9): Status: ACTIVE | Noted: 2019-03-10

## 2019-03-10 NOTE — PROGRESS NOTES
Subjective   Jacob Miller is a 63 y.o. male. Patient is here today for   Chief Complaint   Patient presents with   • Hyperglycemia   • Hyperlipidemia          Vitals:    03/06/19 1314   BP: 118/70   Pulse: 65   Resp: 18       Past Medical History:   Diagnosis Date   • Hyperlipidemia    • Hypertension    • Kidney stones    • PAC (premature atrial contraction)    • Palpitations    • Sleep apnea     USES C-PAP MACHINE      No Known Allergies   Social History     Socioeconomic History   • Marital status:      Spouse name: Not on file   • Number of children: Not on file   • Years of education: Not on file   • Highest education level: Not on file   Social Needs   • Financial resource strain: Not on file   • Food insecurity - worry: Not on file   • Food insecurity - inability: Not on file   • Transportation needs - medical: Not on file   • Transportation needs - non-medical: Not on file   Occupational History   • Not on file   Tobacco Use   • Smoking status: Never Smoker   • Smokeless tobacco: Never Used   • Tobacco comment: Daily caffeine use   Substance and Sexual Activity   • Alcohol use: Yes     Comment: occ   • Drug use: No   • Sexual activity: Not on file   Other Topics Concern   • Not on file   Social History Narrative   • Not on file        Current Outpatient Medications:   •  amLODIPine (NORVASC) 5 MG tablet, TAKE ONE TABLET BY MOUTH DAILY, Disp: 30 tablet, Rfl: 2  •  diclofenac (VOLTAREN) 75 MG EC tablet, Take 75 mg by mouth 2 (Two) Times a Day. AS NEEDED, Disp: , Rfl:   •  lisinopril (PRINIVIL,ZESTRIL) 20 MG tablet, TAKE ONE TABLET BY MOUTH DAILY, Disp: 30 tablet, Rfl: 5  •  metoprolol succinate XL (TOPROL-XL) 50 MG 24 hr tablet, TAKE ONE TABLET BY MOUTH DAILY, Disp: 30 tablet, Rfl: 2  •  simvastatin (ZOCOR) 40 MG tablet, TAKE ONE TABLET BY MOUTH ONCE NIGHTLY (Patient taking differently: TAKE ONE TABLET BY MOUTH THREE TIMES A WEEK), Disp: 30 tablet, Rfl: 3     Objective     This patient is here to  follow-up on his his hypertension, hypercholesterolemia.    He has obstructive sleep apnea and is compliant with use of CPAP.         Review of Systems   Constitutional: Negative.    HENT: Negative.    Respiratory: Negative.    Cardiovascular: Negative.    Musculoskeletal: Negative.    Psychiatric/Behavioral: Negative.        Physical Exam   Constitutional: He is oriented to person, place, and time. He appears well-developed and well-nourished.   Pleasant, neatly groomed, BMI 29.   HENT:   Head: Normocephalic and atraumatic.   Pulmonary/Chest: Effort normal.   Neurological: He is alert and oriented to person, place, and time.   Psychiatric: He has a normal mood and affect. His behavior is normal.   Nursing note and vitals reviewed.        Problem List Items Addressed This Visit        Cardiovascular and Mediastinum    Benign essential hypertension    Hypercholesterolemia       Respiratory    Obstructive sleep apnea - Primary       Other    Hyperglycemia    Overweight (BMI 25.0-29.9)            PLAN  His hypertension is well controlled.    He and I reviewed his labs.  His hypercholesterolemia is well controlled.    He is overweight with a body mass index of 29.  He has hyperglycemia.  He and I discussed the importance of routine aerobic activity.  The American Heart Association recommends 30 minutes of aerobic activity 30 minutes daily.    Weight loss would be beneficial.    He is compliant with use of CPAP regarding the treatment of his obstructive sleep apnea.    He should follow-up for a Medicare wellness visit once yearly.    I like to see him back in about 6 months.  No Follow-up on file.

## 2019-04-04 RX ORDER — SIMVASTATIN 40 MG
TABLET ORAL
Qty: 30 TABLET | Refills: 3 | Status: SHIPPED | OUTPATIENT
Start: 2019-04-04 | End: 2020-03-30

## 2019-04-04 RX ORDER — AMLODIPINE BESYLATE 5 MG/1
TABLET ORAL
Qty: 30 TABLET | Refills: 3 | Status: SHIPPED | OUTPATIENT
Start: 2019-04-04 | End: 2019-09-03 | Stop reason: SDUPTHER

## 2019-04-04 RX ORDER — LISINOPRIL 20 MG/1
TABLET ORAL
Qty: 30 TABLET | Refills: 3 | Status: SHIPPED | OUTPATIENT
Start: 2019-04-04 | End: 2019-08-07 | Stop reason: SDUPTHER

## 2019-05-07 RX ORDER — METOPROLOL SUCCINATE 50 MG/1
TABLET, EXTENDED RELEASE ORAL
Qty: 30 TABLET | Refills: 5 | Status: SHIPPED | OUTPATIENT
Start: 2019-05-07 | End: 2019-11-29 | Stop reason: SDUPTHER

## 2019-07-12 ENCOUNTER — OFFICE VISIT (OUTPATIENT)
Dept: FAMILY MEDICINE CLINIC | Facility: CLINIC | Age: 64
End: 2019-07-12

## 2019-07-12 VITALS
OXYGEN SATURATION: 98 % | TEMPERATURE: 98.8 F | HEIGHT: 74 IN | DIASTOLIC BLOOD PRESSURE: 74 MMHG | BODY MASS INDEX: 29.26 KG/M2 | WEIGHT: 228 LBS | HEART RATE: 67 BPM | SYSTOLIC BLOOD PRESSURE: 132 MMHG

## 2019-07-12 DIAGNOSIS — L08.9 INFECTED SEBACEOUS CYST: Primary | ICD-10-CM

## 2019-07-12 DIAGNOSIS — L72.3 INFECTED SEBACEOUS CYST: Primary | ICD-10-CM

## 2019-07-12 PROCEDURE — 99213 OFFICE O/P EST LOW 20 MIN: CPT | Performed by: NURSE PRACTITIONER

## 2019-07-12 NOTE — PROGRESS NOTES
Subjective   Jacob Miller is a 64 y.o. male.   Chief Complaint   Patient presents with   • Cyst     Right upper thigh      Vitals:    07/12/19 1253   BP: 132/74   Pulse: 67   Temp: 98.8 °F (37.1 °C)   SpO2: 98%     No LMP for male patient.    History of Present Illness  Jacob is here for a follow up for an infected sebaceous cyst. This is a new problem to me. He was seen by his dermatologist on 7/519 and given cephalaxin. They did not makenna it, it opened up on it's own. He states it has improved. He states that there has been a small amount of serosanguinous drainage. He denies fever chills or body aches.     The following portions of the patient's history were reviewed and updated as appropriate: allergies, current medications, past family history, past medical history, past social history, past surgical history and problem list.    Review of Systems   Constitutional: Negative for chills, fatigue and fever.   HENT: Negative.    Respiratory: Negative.    Cardiovascular: Negative.    Skin: Positive for wound.       Objective   Physical Exam   Constitutional: Vital signs are normal. He appears well-developed and well-nourished.   Cardiovascular: Normal rate.   Pulmonary/Chest: Effort normal.   Neurological: He is alert.   Skin:        Infected sebaceous cyst to right upper thigh, there is white drainage in the middle and I removed it with light pressure, I cleaned it with saline and peroxide and covered it. There is a small amount of surrounding erythema but no induration        Assessment/Plan   Jacob was seen today for cyst.    Diagnoses and all orders for this visit:    Infected sebaceous cyst      Finish cephalaxin  Keep the area clean with peroxide twice daily  Follow up in one week for a recheck   Advised to call sooner if he develops a fever, worsening redness, red streaks up his leg

## 2019-07-22 ENCOUNTER — OFFICE VISIT (OUTPATIENT)
Dept: FAMILY MEDICINE CLINIC | Facility: CLINIC | Age: 64
End: 2019-07-22

## 2019-07-22 VITALS
OXYGEN SATURATION: 98 % | HEIGHT: 74 IN | SYSTOLIC BLOOD PRESSURE: 138 MMHG | RESPIRATION RATE: 16 BRPM | BODY MASS INDEX: 28.88 KG/M2 | DIASTOLIC BLOOD PRESSURE: 80 MMHG | TEMPERATURE: 98.6 F | HEART RATE: 57 BPM | WEIGHT: 225 LBS

## 2019-07-22 DIAGNOSIS — L08.9 INFECTED SEBACEOUS CYST: Primary | ICD-10-CM

## 2019-07-22 DIAGNOSIS — L72.3 INFECTED SEBACEOUS CYST: Primary | ICD-10-CM

## 2019-07-22 PROCEDURE — 99212 OFFICE O/P EST SF 10 MIN: CPT | Performed by: NURSE PRACTITIONER

## 2019-07-22 NOTE — PROGRESS NOTES
Subjective   Jacob Miller is a 64 y.o. male. Patient is here today for   Chief Complaint   Patient presents with   • Infected sebaceous cyst     1 week follow up           Vitals:    07/22/19 1116   BP: 138/80   Pulse: 57   Resp: 16   Temp: 98.6 °F (37 °C)   SpO2: 98%     The following portions of the patient's history were reviewed and updated as appropriate: allergies, current medications, past family history, past medical history, past social history, past surgical history and problem list.    Past Medical History:   Diagnosis Date   • Hyperlipidemia    • Hypertension    • Kidney stones    • PAC (premature atrial contraction)    • Palpitations    • Sleep apnea     USES C-PAP MACHINE      No Known Allergies   Social History     Socioeconomic History   • Marital status:      Spouse name: Not on file   • Number of children: Not on file   • Years of education: Not on file   • Highest education level: Not on file   Tobacco Use   • Smoking status: Never Smoker   • Smokeless tobacco: Never Used   • Tobacco comment: Daily caffeine use   Substance and Sexual Activity   • Alcohol use: Yes     Comment: occ   • Drug use: No        Current Outpatient Medications:   •  amLODIPine (NORVASC) 5 MG tablet, TAKE ONE TABLET BY MOUTH DAILY, Disp: 30 tablet, Rfl: 3  •  diclofenac (VOLTAREN) 75 MG EC tablet, Take 75 mg by mouth 2 (Two) Times a Day. AS NEEDED, Disp: , Rfl:   •  lisinopril (PRINIVIL,ZESTRIL) 20 MG tablet, TAKE ONE TABLET BY MOUTH DAILY, Disp: 30 tablet, Rfl: 3  •  metoprolol succinate XL (TOPROL-XL) 50 MG 24 hr tablet, TAKE ONE TABLET BY MOUTH DAILY, Disp: 30 tablet, Rfl: 5  •  simvastatin (ZOCOR) 40 MG tablet, TAKE ONE TABLET BY MOUTH ONCE NIGHTLY, Disp: 30 tablet, Rfl: 3     Objective     History of Present Illness  Jacob is here for a one week follow up for an infected sebaceous cyst. He finished a course of cephalaxin. He states that he has noticed a small amount of drainage but denies fever, or pain      Review of Systems   Constitutional: Negative for fever.   Skin: Positive for wound (sebaceous cyst ).       Physical Exam   Constitutional: Vital signs are normal. He appears well-developed and well-nourished.   Cardiovascular: Normal rate.   Pulmonary/Chest: Effort normal.   Neurological: He is alert.   Skin: Skin is warm, dry and intact.   Cyst has improved, there is still some redness but no induration , non tender        ASSESSMENT     Problem List Items Addressed This Visit     None      Visit Diagnoses     Infected sebaceous cyst    -  Primary          PLAN    Overall he has improved  No need for further antibiotics, keep the area clean and dry   Follow up as needed

## 2019-08-08 RX ORDER — LISINOPRIL 20 MG/1
TABLET ORAL
Qty: 30 TABLET | Refills: 2 | Status: SHIPPED | OUTPATIENT
Start: 2019-08-08 | End: 2019-11-29 | Stop reason: SDUPTHER

## 2019-09-04 RX ORDER — AMLODIPINE BESYLATE 5 MG/1
TABLET ORAL
Qty: 18 TABLET | Refills: 2 | Status: SHIPPED | OUTPATIENT
Start: 2019-09-04 | End: 2019-12-17 | Stop reason: SDUPTHER

## 2019-09-19 ENCOUNTER — OFFICE VISIT (OUTPATIENT)
Dept: FAMILY MEDICINE CLINIC | Facility: CLINIC | Age: 64
End: 2019-09-19

## 2019-09-19 VITALS
HEIGHT: 74 IN | DIASTOLIC BLOOD PRESSURE: 72 MMHG | OXYGEN SATURATION: 99 % | SYSTOLIC BLOOD PRESSURE: 138 MMHG | TEMPERATURE: 98.5 F | RESPIRATION RATE: 16 BRPM | HEART RATE: 65 BPM | WEIGHT: 228.4 LBS | BODY MASS INDEX: 29.31 KG/M2

## 2019-09-19 DIAGNOSIS — N40.0 BENIGN NON-NODULAR PROSTATIC HYPERPLASIA WITHOUT LOWER URINARY TRACT SYMPTOMS: ICD-10-CM

## 2019-09-19 DIAGNOSIS — R73.9 HYPERGLYCEMIA: ICD-10-CM

## 2019-09-19 DIAGNOSIS — E66.3 OVERWEIGHT (BMI 25.0-29.9): ICD-10-CM

## 2019-09-19 DIAGNOSIS — I10 BENIGN ESSENTIAL HYPERTENSION: Primary | ICD-10-CM

## 2019-09-19 DIAGNOSIS — R35.0 URINARY FREQUENCY: Primary | ICD-10-CM

## 2019-09-19 DIAGNOSIS — G47.33 OBSTRUCTIVE SLEEP APNEA: ICD-10-CM

## 2019-09-19 LAB
BILIRUB BLD-MCNC: NEGATIVE MG/DL
CLARITY, POC: CLEAR
COLOR UR: ABNORMAL
GLUCOSE UR STRIP-MCNC: NEGATIVE MG/DL
KETONES UR QL: NEGATIVE
LEUKOCYTE EST, POC: NEGATIVE
NITRITE UR-MCNC: NEGATIVE MG/ML
PH UR: 7 [PH] (ref 5–8)
PROT UR STRIP-MCNC: NEGATIVE MG/DL
RBC # UR STRIP: ABNORMAL /UL
SP GR UR: 1.01 (ref 1–1.03)
UROBILINOGEN UR QL: NORMAL

## 2019-09-19 PROCEDURE — 81003 URINALYSIS AUTO W/O SCOPE: CPT | Performed by: INTERNAL MEDICINE

## 2019-09-19 PROCEDURE — 99214 OFFICE O/P EST MOD 30 MIN: CPT | Performed by: INTERNAL MEDICINE

## 2019-09-19 RX ORDER — TOLTERODINE 4 MG/1
4 CAPSULE, EXTENDED RELEASE ORAL DAILY
Qty: 90 CAPSULE | Refills: 3 | Status: SHIPPED | OUTPATIENT
Start: 2019-09-19 | End: 2020-09-16

## 2019-09-20 LAB
ALBUMIN SERPL-MCNC: 4.8 G/DL (ref 3.5–5.2)
ALBUMIN/GLOB SERPL: 2 G/DL
ALP SERPL-CCNC: 70 U/L (ref 39–117)
ALT SERPL-CCNC: 33 U/L (ref 1–41)
AST SERPL-CCNC: 21 U/L (ref 1–40)
BASOPHILS # BLD AUTO: 0.01 10*3/MM3 (ref 0–0.2)
BASOPHILS NFR BLD AUTO: 0.3 % (ref 0–1.5)
BILIRUB SERPL-MCNC: 0.7 MG/DL (ref 0.2–1.2)
BUN SERPL-MCNC: 18 MG/DL (ref 8–23)
BUN/CREAT SERPL: 19.1 (ref 7–25)
CALCIUM SERPL-MCNC: 9.3 MG/DL (ref 8.6–10.5)
CHLORIDE SERPL-SCNC: 100 MMOL/L (ref 98–107)
CHOLEST SERPL-MCNC: 125 MG/DL (ref 0–200)
CO2 SERPL-SCNC: 29.2 MMOL/L (ref 22–29)
CREAT SERPL-MCNC: 0.94 MG/DL (ref 0.76–1.27)
EOSINOPHIL # BLD AUTO: 0.08 10*3/MM3 (ref 0–0.4)
EOSINOPHIL NFR BLD AUTO: 2.6 % (ref 0.3–6.2)
ERYTHROCYTE [DISTWIDTH] IN BLOOD BY AUTOMATED COUNT: 14.6 % (ref 12.3–15.4)
GLOBULIN SER CALC-MCNC: 2.4 GM/DL
GLUCOSE SERPL-MCNC: 111 MG/DL (ref 65–99)
HBA1C MFR BLD: 6.1 % (ref 4.8–5.6)
HCT VFR BLD AUTO: 44.4 % (ref 37.5–51)
HDLC SERPL-MCNC: 29 MG/DL (ref 40–60)
HGB BLD-MCNC: 14.8 G/DL (ref 13–17.7)
IMM GRANULOCYTES # BLD AUTO: 0.03 10*3/MM3 (ref 0–0.05)
IMM GRANULOCYTES NFR BLD AUTO: 1 % (ref 0–0.5)
LDLC SERPL CALC-MCNC: 71 MG/DL (ref 0–100)
LDLC/HDLC SERPL: 2.46 {RATIO}
LYMPHOCYTES # BLD AUTO: 1 10*3/MM3 (ref 0.7–3.1)
LYMPHOCYTES NFR BLD AUTO: 32.7 % (ref 19.6–45.3)
MCH RBC QN AUTO: 29.1 PG (ref 26.6–33)
MCHC RBC AUTO-ENTMCNC: 33.3 G/DL (ref 31.5–35.7)
MCV RBC AUTO: 87.4 FL (ref 79–97)
MONOCYTES # BLD AUTO: 0.49 10*3/MM3 (ref 0.1–0.9)
MONOCYTES NFR BLD AUTO: 16 % (ref 5–12)
NEUTROPHILS # BLD AUTO: 1.45 10*3/MM3 (ref 1.7–7)
NEUTROPHILS NFR BLD AUTO: 47.4 % (ref 42.7–76)
NRBC BLD AUTO-RTO: 0 /100 WBC (ref 0–0.2)
PLATELET # BLD AUTO: 152 10*3/MM3 (ref 140–450)
POTASSIUM SERPL-SCNC: 4.7 MMOL/L (ref 3.5–5.2)
PROT SERPL-MCNC: 7.2 G/DL (ref 6–8.5)
PSA SERPL-MCNC: 2.67 NG/ML (ref 0–4)
RBC # BLD AUTO: 5.08 10*6/MM3 (ref 4.14–5.8)
SODIUM SERPL-SCNC: 141 MMOL/L (ref 136–145)
TRIGL SERPL-MCNC: 124 MG/DL (ref 0–150)
TSH SERPL DL<=0.005 MIU/L-ACNC: 1.35 UIU/ML (ref 0.27–4.2)
VLDLC SERPL CALC-MCNC: 24.8 MG/DL
WBC # BLD AUTO: 3.06 10*3/MM3 (ref 3.4–10.8)

## 2019-09-27 NOTE — PROGRESS NOTES
Subjective   Jacob Miller is a 64 y.o. male. Patient is here today for   Chief Complaint   Patient presents with   • Urinary Frequency     PT STATES HAS HAD ALL LIFE BUT RECENTLY IN A THE PAST MONTH FEELS LIKE HAS BEEN GOING MORE FREQUENTLY           Vitals:    09/19/19 0805   BP: 138/72   Pulse: 65   Resp: 16   Temp: 98.5 °F (36.9 °C)   SpO2: 99%       Past Medical History:   Diagnosis Date   • Hyperlipidemia    • Hypertension    • Kidney stones    • PAC (premature atrial contraction)    • Palpitations    • Sleep apnea     USES C-PAP MACHINE      No Known Allergies   Social History     Socioeconomic History   • Marital status:      Spouse name: Not on file   • Number of children: Not on file   • Years of education: Not on file   • Highest education level: Not on file   Tobacco Use   • Smoking status: Never Smoker   • Smokeless tobacco: Never Used   • Tobacco comment: Daily caffeine use   Substance and Sexual Activity   • Alcohol use: Yes     Comment: occ   • Drug use: No        Current Outpatient Medications:   •  amLODIPine (NORVASC) 5 MG tablet, TAKE ONE TABLET BY MOUTH DAILY, Disp: 18 tablet, Rfl: 2  •  diclofenac (VOLTAREN) 75 MG EC tablet, Take 75 mg by mouth 2 (Two) Times a Day. AS NEEDED, Disp: , Rfl:   •  lisinopril (PRINIVIL,ZESTRIL) 20 MG tablet, TAKE ONE TABLET BY MOUTH DAILY, Disp: 30 tablet, Rfl: 2  •  metoprolol succinate XL (TOPROL-XL) 50 MG 24 hr tablet, TAKE ONE TABLET BY MOUTH DAILY, Disp: 30 tablet, Rfl: 5  •  simvastatin (ZOCOR) 40 MG tablet, TAKE ONE TABLET BY MOUTH ONCE NIGHTLY, Disp: 30 tablet, Rfl: 3  •  tolterodine LA (DETROL LA) 4 MG 24 hr capsule, Take 1 capsule by mouth Daily., Disp: 90 capsule, Rfl: 3     Objective     He is noticed a big increase in his urinary frequency.  He notes no urinary hesitancy.  He has no burning on urination, no dysuria.         Review of Systems   Constitutional: Negative.    HENT: Negative.    Respiratory: Negative.    Cardiovascular: Negative.     Genitourinary: Positive for frequency. Negative for difficulty urinating, dysuria, flank pain, hematuria and urgency.   Psychiatric/Behavioral: Negative.        Physical Exam   Constitutional: He is oriented to person, place, and time. He appears well-developed and well-nourished.   Pleasant, neatly groomed, BMI 29.   HENT:   Head: Normocephalic and atraumatic.   Cardiovascular: Regular rhythm and normal heart sounds.   Pulmonary/Chest: Effort normal and breath sounds normal.   Genitourinary:   Genitourinary Comments: Prostate moderately enlarged, nontender, no nodules.   Neurological: He is alert and oriented to person, place, and time.   Psychiatric: He has a normal mood and affect. His behavior is normal.   Nursing note and vitals reviewed.        Problem List Items Addressed This Visit        Cardiovascular and Mediastinum    Benign essential hypertension - Primary    Relevant Orders    Comprehensive Metabolic Panel (Completed)       Respiratory    Obstructive sleep apnea    Relevant Orders    CBC & Differential (Completed)    TSH (Completed)       Genitourinary    Benign non-nodular prostatic hyperplasia without lower urinary tract symptoms    Relevant Orders    PSA DIAGNOSTIC (Completed)       Other    Hyperglycemia    Relevant Orders    Lipid Panel With LDL / HDL Ratio (Completed)    Hemoglobin A1c (Completed)    Overweight (BMI 25.0-29.9)            PLAN  I think is most likely that he has some bladder spasms and an overactive bladder.  Gave him a prescription for Detrol LA.  I wind up checking some labs.    No Follow-up on file.

## 2019-11-11 RX ORDER — AMLODIPINE BESYLATE 5 MG/1
TABLET ORAL
Qty: 18 TABLET | Refills: 2 | Status: SHIPPED | OUTPATIENT
Start: 2019-11-11 | End: 2019-12-17 | Stop reason: SDUPTHER

## 2019-11-26 ENCOUNTER — TELEPHONE (OUTPATIENT)
Dept: FAMILY MEDICINE CLINIC | Facility: CLINIC | Age: 64
End: 2019-11-26

## 2019-11-26 NOTE — TELEPHONE ENCOUNTER
----- Message from Sarah Cuello sent at 11/26/2019  9:35 AM EST -----  PATIENT IS CALLING TO SEE IF HE COULD GET A SCRIPT FOR HIS GLUCOSE TEST STRIPS.    ONE TOUCH ULTRA TEST STRIPS  1QD    PHARMACY: [facility] [address]    PLEASE CALL PT BACK WITH ANY ISSUES OR QUESTIONS [phone]    THANK YOU

## 2019-12-03 RX ORDER — METOPROLOL SUCCINATE 50 MG/1
TABLET, EXTENDED RELEASE ORAL
Qty: 24 TABLET | Refills: 4 | Status: SHIPPED | OUTPATIENT
Start: 2019-12-03 | End: 2020-04-07

## 2019-12-03 RX ORDER — LISINOPRIL 20 MG/1
TABLET ORAL
Qty: 30 TABLET | Refills: 1 | Status: SHIPPED | OUTPATIENT
Start: 2019-12-03 | End: 2020-02-06

## 2019-12-17 RX ORDER — AMLODIPINE BESYLATE 5 MG/1
TABLET ORAL
Qty: 18 TABLET | Refills: 2 | Status: SHIPPED | OUTPATIENT
Start: 2019-12-17 | End: 2020-03-04

## 2019-12-17 RX ORDER — DICLOFENAC SODIUM 75 MG/1
TABLET, DELAYED RELEASE ORAL
Qty: 60 TABLET | Refills: 1 | Status: SHIPPED | OUTPATIENT
Start: 2019-12-17 | End: 2021-01-26

## 2020-02-06 RX ORDER — LISINOPRIL 20 MG/1
TABLET ORAL
Qty: 30 TABLET | Refills: 0 | Status: SHIPPED | OUTPATIENT
Start: 2020-02-06 | End: 2020-03-11

## 2020-03-04 RX ORDER — AMLODIPINE BESYLATE 5 MG/1
TABLET ORAL
Qty: 30 TABLET | Refills: 0 | Status: SHIPPED | OUTPATIENT
Start: 2020-03-04 | End: 2020-03-30

## 2020-03-11 ENCOUNTER — TELEPHONE (OUTPATIENT)
Dept: FAMILY MEDICINE CLINIC | Facility: CLINIC | Age: 65
End: 2020-03-11

## 2020-03-11 RX ORDER — LISINOPRIL 20 MG/1
TABLET ORAL
Qty: 30 TABLET | Refills: 0 | Status: SHIPPED | OUTPATIENT
Start: 2020-03-11 | End: 2020-04-07

## 2020-03-30 RX ORDER — SIMVASTATIN 40 MG
TABLET ORAL
Qty: 30 TABLET | Refills: 2 | Status: SHIPPED | OUTPATIENT
Start: 2020-03-30 | End: 2020-08-20 | Stop reason: SDUPTHER

## 2020-03-30 RX ORDER — AMLODIPINE BESYLATE 5 MG/1
TABLET ORAL
Qty: 30 TABLET | Refills: 1 | Status: SHIPPED | OUTPATIENT
Start: 2020-03-30 | End: 2020-06-03

## 2020-04-07 RX ORDER — METOPROLOL SUCCINATE 50 MG/1
TABLET, EXTENDED RELEASE ORAL
Qty: 30 TABLET | Refills: 1 | Status: SHIPPED | OUTPATIENT
Start: 2020-04-07 | End: 2020-06-03

## 2020-04-07 RX ORDER — LISINOPRIL 20 MG/1
TABLET ORAL
Qty: 30 TABLET | Refills: 1 | Status: SHIPPED | OUTPATIENT
Start: 2020-04-07 | End: 2020-06-15

## 2020-06-03 RX ORDER — AMLODIPINE BESYLATE 5 MG/1
TABLET ORAL
Qty: 30 TABLET | Refills: 0 | Status: SHIPPED | OUTPATIENT
Start: 2020-06-03 | End: 2020-07-14

## 2020-06-03 RX ORDER — METOPROLOL SUCCINATE 50 MG/1
TABLET, EXTENDED RELEASE ORAL
Qty: 30 TABLET | Refills: 0 | Status: SHIPPED | OUTPATIENT
Start: 2020-06-03 | End: 2020-07-14

## 2020-06-15 RX ORDER — LISINOPRIL 20 MG/1
TABLET ORAL
Qty: 30 TABLET | Refills: 0 | Status: SHIPPED | OUTPATIENT
Start: 2020-06-15 | End: 2020-07-14

## 2020-07-14 RX ORDER — AMLODIPINE BESYLATE 5 MG/1
TABLET ORAL
Qty: 30 TABLET | Refills: 0 | Status: SHIPPED | OUTPATIENT
Start: 2020-07-14 | End: 2020-08-20 | Stop reason: SDUPTHER

## 2020-07-14 RX ORDER — METOPROLOL SUCCINATE 50 MG/1
TABLET, EXTENDED RELEASE ORAL
Qty: 30 TABLET | Refills: 0 | Status: SHIPPED | OUTPATIENT
Start: 2020-07-14 | End: 2020-08-20 | Stop reason: SDUPTHER

## 2020-07-14 RX ORDER — LISINOPRIL 20 MG/1
TABLET ORAL
Qty: 30 TABLET | Refills: 0 | Status: SHIPPED | OUTPATIENT
Start: 2020-07-14 | End: 2020-08-20 | Stop reason: SDUPTHER

## 2020-08-17 RX ORDER — METOPROLOL SUCCINATE 50 MG/1
TABLET, EXTENDED RELEASE ORAL
Qty: 30 TABLET | Refills: 0 | OUTPATIENT
Start: 2020-08-17

## 2020-08-17 RX ORDER — AMLODIPINE BESYLATE 5 MG/1
TABLET ORAL
Qty: 30 TABLET | Refills: 0 | OUTPATIENT
Start: 2020-08-17

## 2020-08-17 RX ORDER — LISINOPRIL 20 MG/1
TABLET ORAL
Qty: 30 TABLET | Refills: 0 | OUTPATIENT
Start: 2020-08-17

## 2020-08-19 ENCOUNTER — TELEPHONE (OUTPATIENT)
Dept: FAMILY MEDICINE CLINIC | Facility: CLINIC | Age: 65
End: 2020-08-19

## 2020-08-19 NOTE — TELEPHONE ENCOUNTER
PT CALLED AND MADE AN APPT. FOR 9-16-20 FOR FU. PT IS IN NEED OF RX REFILLS ON THE FOLLOWING        amLODIPine (NORVASC) 5 MG tablet       TAKE ONE TABLET BY MOUTH DAILY #30    lisinopril (PRINIVIL,ZESTRIL) 20 MG tablet TAKE ONE TABLET BY MOUTH DAILY #30      metoprolol succinate XL (TOPROL-XL) 50 MG 24 hr tablet TAKE ONE TABLET BY MOUTH DAILY #30      simvastatin (ZOCOR) 40 MG tablet   TAKE ONE TABLET BY MOUTH NIGHTLY  #30      PT IS REQUESTING 90 DAY SUPPLY .    [facility] [id number] [place] [address] AT [facility] - [phone]  - [phone] FX    PLEASE ADVISE PT [phone]

## 2020-08-20 RX ORDER — AMLODIPINE BESYLATE 5 MG/1
5 TABLET ORAL DAILY
Qty: 30 TABLET | Refills: 0 | Status: SHIPPED | OUTPATIENT
Start: 2020-08-20 | End: 2020-09-18

## 2020-08-20 RX ORDER — SIMVASTATIN 40 MG
40 TABLET ORAL
Qty: 30 TABLET | Refills: 0 | Status: SHIPPED | OUTPATIENT
Start: 2020-08-20 | End: 2021-02-12

## 2020-08-20 RX ORDER — LISINOPRIL 20 MG/1
20 TABLET ORAL DAILY
Qty: 30 TABLET | Refills: 0 | Status: SHIPPED | OUTPATIENT
Start: 2020-08-20 | End: 2020-09-16

## 2020-08-20 RX ORDER — METOPROLOL SUCCINATE 50 MG/1
50 TABLET, EXTENDED RELEASE ORAL DAILY
Qty: 30 TABLET | Refills: 0 | Status: SHIPPED | OUTPATIENT
Start: 2020-08-20 | End: 2020-09-18

## 2020-08-24 ENCOUNTER — TRANSCRIBE ORDERS (OUTPATIENT)
Dept: ADMINISTRATIVE | Facility: HOSPITAL | Age: 65
End: 2020-08-24

## 2020-08-24 DIAGNOSIS — Z13.6 ENCOUNTER FOR SCREENING FOR VASCULAR DISEASE: Primary | ICD-10-CM

## 2020-09-03 ENCOUNTER — HOSPITAL ENCOUNTER (OUTPATIENT)
Dept: CARDIOLOGY | Facility: HOSPITAL | Age: 65
Discharge: HOME | End: 2020-09-03
Admitting: INTERNAL MEDICINE

## 2020-09-03 VITALS
DIASTOLIC BLOOD PRESSURE: 88 MMHG | WEIGHT: 214 LBS | BODY MASS INDEX: 28.99 KG/M2 | HEART RATE: 82 BPM | HEIGHT: 72 IN | SYSTOLIC BLOOD PRESSURE: 164 MMHG

## 2020-09-03 DIAGNOSIS — Z13.6 ENCOUNTER FOR SCREENING FOR VASCULAR DISEASE: ICD-10-CM

## 2020-09-03 LAB
BH CV ECHO MEAS - DIST AO DIAM: 1.51 CM
BH CV VAS BP LEFT ARM: NORMAL MMHG
BH CV VAS BP RIGHT ARM: NORMAL MMHG
BH CV XLRA MEAS - MID AO DIAM: 1.72 CM
BH CV XLRA MEAS - PAD LEFT ABI DP: 1.12
BH CV XLRA MEAS - PAD LEFT ABI PT: 1.09
BH CV XLRA MEAS - PAD LEFT ARM: 164 MMHG
BH CV XLRA MEAS - PAD LEFT LEG DP: 184 MMHG
BH CV XLRA MEAS - PAD LEFT LEG PT: 180 MMHG
BH CV XLRA MEAS - PAD RIGHT ABI DP: 1.16
BH CV XLRA MEAS - PAD RIGHT ABI PT: 1.12
BH CV XLRA MEAS - PAD RIGHT ARM: 160 MMHG
BH CV XLRA MEAS - PAD RIGHT LEG DP: 190 MMHG
BH CV XLRA MEAS - PAD RIGHT LEG PT: 184 MMHG
BH CV XLRA MEAS - PROX AO DIAM: 2.23 CM
BH CV XLRA MEAS LEFT ICA/CCA RATIO: 1.05
BH CV XLRA MEAS LEFT MID CCA PSV: NORMAL CM/SEC
BH CV XLRA MEAS LEFT MID ICA PSV: NORMAL CM/SEC
BH CV XLRA MEAS LEFT PROX ECA PSV: NORMAL CM/SEC
BH CV XLRA MEAS RIGHT ICA/CCA RATIO: 0.85
BH CV XLRA MEAS RIGHT MID CCA PSV: NORMAL CM/SEC
BH CV XLRA MEAS RIGHT MID ICA PSV: NORMAL CM/SEC
BH CV XLRA MEAS RIGHT PROX ECA PSV: NORMAL CM/SEC

## 2020-09-03 PROCEDURE — 93799 UNLISTED CV SVC/PROCEDURE: CPT

## 2020-09-10 ENCOUNTER — LAB (OUTPATIENT)
Dept: FAMILY MEDICINE CLINIC | Facility: CLINIC | Age: 65
End: 2020-09-10

## 2020-09-10 DIAGNOSIS — E78.00 HYPERCHOLESTEROLEMIA: ICD-10-CM

## 2020-09-10 DIAGNOSIS — R73.9 HYPERGLYCEMIA: ICD-10-CM

## 2020-09-10 DIAGNOSIS — I10 BENIGN ESSENTIAL HYPERTENSION: Primary | ICD-10-CM

## 2020-09-11 LAB
ALBUMIN SERPL-MCNC: 4.7 G/DL (ref 3.5–5.2)
ALBUMIN/GLOB SERPL: 2.2 G/DL
ALP SERPL-CCNC: 87 U/L (ref 39–117)
ALT SERPL-CCNC: 38 U/L (ref 1–41)
APPEARANCE UR: CLEAR
AST SERPL-CCNC: 26 U/L (ref 1–40)
BACTERIA #/AREA URNS HPF: NORMAL /HPF
BASOPHILS # BLD AUTO: ABNORMAL 10*3/UL
BASOPHILS # BLD MANUAL: 0 10*3/MM3 (ref 0–0.2)
BASOPHILS NFR BLD MANUAL: 0 % (ref 0–1.5)
BILIRUB SERPL-MCNC: 0.7 MG/DL (ref 0–1.2)
BILIRUB UR QL STRIP: NEGATIVE
BUN SERPL-MCNC: 20 MG/DL (ref 8–23)
BUN/CREAT SERPL: 24.1 (ref 7–25)
CALCIUM SERPL-MCNC: 8.8 MG/DL (ref 8.6–10.5)
CASTS URNS MICRO: NORMAL
CHLORIDE SERPL-SCNC: 102 MMOL/L (ref 98–107)
CHOLEST SERPL-MCNC: 108 MG/DL (ref 0–200)
CO2 SERPL-SCNC: 29.6 MMOL/L (ref 22–29)
COLOR UR: YELLOW
CREAT SERPL-MCNC: 0.83 MG/DL (ref 0.76–1.27)
DIFFERENTIAL COMMENT: ABNORMAL
EOSINOPHIL # BLD AUTO: ABNORMAL 10*3/UL
EOSINOPHIL # BLD MANUAL: 0.03 10*3/MM3 (ref 0–0.4)
EOSINOPHIL NFR BLD AUTO: ABNORMAL %
EOSINOPHIL NFR BLD MANUAL: 1 % (ref 0.3–6.2)
EPI CELLS #/AREA URNS HPF: NORMAL /HPF
ERYTHROCYTE [DISTWIDTH] IN BLOOD BY AUTOMATED COUNT: 14.9 % (ref 12.3–15.4)
GLOBULIN SER CALC-MCNC: 2.1 GM/DL
GLUCOSE SERPL-MCNC: 102 MG/DL (ref 65–99)
GLUCOSE UR QL: NEGATIVE
HBA1C MFR BLD: 5.8 % (ref 4.8–5.6)
HCT VFR BLD AUTO: 43.6 % (ref 37.5–51)
HDLC SERPL-MCNC: 24 MG/DL (ref 40–60)
HGB BLD-MCNC: 14.2 G/DL (ref 13–17.7)
HGB UR QL STRIP: NEGATIVE
KETONES UR QL STRIP: NEGATIVE
LDLC SERPL CALC-MCNC: 56 MG/DL (ref 0–100)
LDLC/HDLC SERPL: 2.33 {RATIO}
LEUKOCYTE ESTERASE UR QL STRIP: NEGATIVE
LYMPHOCYTES # BLD AUTO: ABNORMAL 10*3/UL
LYMPHOCYTES # BLD MANUAL: 1.18 10*3/MM3 (ref 0.7–3.1)
LYMPHOCYTES NFR BLD AUTO: ABNORMAL %
LYMPHOCYTES NFR BLD MANUAL: 38 % (ref 19.6–45.3)
MCH RBC QN AUTO: 28 PG (ref 26.6–33)
MCHC RBC AUTO-ENTMCNC: 32.6 G/DL (ref 31.5–35.7)
MCV RBC AUTO: 85.8 FL (ref 79–97)
MONOCYTES # BLD MANUAL: 0.31 10*3/MM3 (ref 0.1–0.9)
MONOCYTES NFR BLD AUTO: ABNORMAL %
MONOCYTES NFR BLD MANUAL: 10 % (ref 5–12)
NEUTROPHILS # BLD MANUAL: 1.58 10*3/MM3 (ref 1.7–7)
NEUTROPHILS NFR BLD AUTO: ABNORMAL %
NEUTROPHILS NFR BLD MANUAL: 51 % (ref 42.7–76)
NITRITE UR QL STRIP: NEGATIVE
PH UR STRIP: 7.5 [PH] (ref 5–8)
PLATELET # BLD AUTO: 151 10*3/MM3 (ref 140–450)
PLATELET BLD QL SMEAR: ABNORMAL
POTASSIUM SERPL-SCNC: 4.4 MMOL/L (ref 3.5–5.2)
PROT SERPL-MCNC: 6.8 G/DL (ref 6–8.5)
PROT UR QL STRIP: ABNORMAL
RBC # BLD AUTO: 5.08 10*6/MM3 (ref 4.14–5.8)
RBC #/AREA URNS HPF: NORMAL /HPF
RBC MORPH BLD: ABNORMAL
SODIUM SERPL-SCNC: 139 MMOL/L (ref 136–145)
SP GR UR: 1.02 (ref 1–1.03)
TRIGL SERPL-MCNC: 141 MG/DL (ref 0–150)
TSH SERPL DL<=0.005 MIU/L-ACNC: 1.2 UIU/ML (ref 0.27–4.2)
UROBILINOGEN UR STRIP-MCNC: ABNORMAL MG/DL
VLDLC SERPL CALC-MCNC: 28.2 MG/DL
WBC # BLD AUTO: 3.1 10*3/MM3 (ref 3.4–10.8)
WBC #/AREA URNS HPF: NORMAL /HPF

## 2020-09-16 ENCOUNTER — OFFICE VISIT (OUTPATIENT)
Dept: FAMILY MEDICINE CLINIC | Facility: CLINIC | Age: 65
End: 2020-09-16

## 2020-09-16 VITALS
BODY MASS INDEX: 29.72 KG/M2 | DIASTOLIC BLOOD PRESSURE: 86 MMHG | OXYGEN SATURATION: 98 % | RESPIRATION RATE: 18 BRPM | HEIGHT: 72 IN | TEMPERATURE: 97.8 F | HEART RATE: 74 BPM | SYSTOLIC BLOOD PRESSURE: 158 MMHG | WEIGHT: 219.4 LBS

## 2020-09-16 DIAGNOSIS — N32.81 OVERACTIVE BLADDER: ICD-10-CM

## 2020-09-16 DIAGNOSIS — E78.00 HYPERCHOLESTEROLEMIA: ICD-10-CM

## 2020-09-16 DIAGNOSIS — I10 BENIGN ESSENTIAL HYPERTENSION: Primary | ICD-10-CM

## 2020-09-16 DIAGNOSIS — G47.33 OBSTRUCTIVE SLEEP APNEA: ICD-10-CM

## 2020-09-16 PROCEDURE — 99214 OFFICE O/P EST MOD 30 MIN: CPT | Performed by: INTERNAL MEDICINE

## 2020-09-16 RX ORDER — LISINOPRIL AND HYDROCHLOROTHIAZIDE 20; 12.5 MG/1; MG/1
1 TABLET ORAL DAILY
Qty: 30 TABLET | Refills: 11 | Status: SHIPPED | OUTPATIENT
Start: 2020-09-16 | End: 2021-04-02 | Stop reason: SDUPTHER

## 2020-09-16 RX ORDER — OXYBUTYNIN CHLORIDE 5 MG/5ML
5 SYRUP ORAL 2 TIMES DAILY
Qty: 180 ML | Refills: 3 | Status: SHIPPED | OUTPATIENT
Start: 2020-09-16 | End: 2020-10-14

## 2020-09-17 PROBLEM — N32.81 OVERACTIVE BLADDER: Status: ACTIVE | Noted: 2020-09-17

## 2020-09-17 NOTE — PROGRESS NOTES
Subjective   Jacob Miller is a 65 y.o. male. Patient is here today for   Chief Complaint   Patient presents with   • Hyperlipidemia     F/U LABS.    • Hypertension          Vitals:    09/16/20 1511   BP: 158/86   Pulse: 74   Resp: 18   Temp: 97.8 °F (36.6 °C)   SpO2: 98%     Body mass index is 29.75 kg/m².      Past Medical History:   Diagnosis Date   • Hyperlipidemia    • Hypertension    • Kidney stones    • PAC (premature atrial contraction)    • Palpitations    • Sleep apnea     USES C-PAP MACHINE      No Known Allergies   Social History     Socioeconomic History   • Marital status:      Spouse name: Not on file   • Number of children: Not on file   • Years of education: Not on file   • Highest education level: Not on file   Tobacco Use   • Smoking status: Never Smoker   • Smokeless tobacco: Never Used   • Tobacco comment: Daily caffeine use   Substance and Sexual Activity   • Alcohol use: Yes     Comment: occ   • Drug use: No        Current Outpatient Medications:   •  amLODIPine (NORVASC) 5 MG tablet, Take 1 tablet by mouth Daily., Disp: 30 tablet, Rfl: 0  •  diclofenac (VOLTAREN) 75 MG EC tablet, TAKE ONE TABLET BY MOUTH TWICE A DAY AS NEEDED FOR PAIN (Patient taking differently: Daily.), Disp: 60 tablet, Rfl: 1  •  glucose blood test strip, Use as instructed- 1QD, Disp: 30 each, Rfl: 12  •  metoprolol succinate XL (TOPROL-XL) 50 MG 24 hr tablet, Take 1 tablet by mouth Daily., Disp: 30 tablet, Rfl: 0  •  simvastatin (ZOCOR) 40 MG tablet, Take 1 tablet by mouth every night at bedtime., Disp: 30 tablet, Rfl: 0  •  lisinopril-hydrochlorothiazide (PRINZIDE,ZESTORETIC) 20-12.5 MG per tablet, Take 1 tablet by mouth Daily., Disp: 30 tablet, Rfl: 11  •  oxybutynin (DITROPAN) 5 MG/5ML syrup, Take 5 mL by mouth 2 (Two) Times a Day., Disp: 180 mL, Rfl: 3     Objective     He is here to follow-up on hyperlipidemia and hypertension.    He tells me that he feels about.  He is recently retired.    While he was  looking at labs prior to that he noticed is getting more active.  I told him the reason for that was that it had to be at least 1 year his last PSA in order to have insurance pay for it.  At the time he had his labs done last week it had not yet been a year.  Of course now it is.    He had been taking a medication to treat his overactive bladder doing by his urologist.  He said that did not work and he would like to try a  less expensive medication for this issue.    Hyperlipidemia    Hypertension         Review of Systems   Constitutional: Negative.    HENT: Negative.    Respiratory: Negative.    Cardiovascular: Negative.    Genitourinary: Positive for frequency.   Neurological: Negative.    Hematological: Negative.    Psychiatric/Behavioral: Negative.        Physical Exam  Vitals signs and nursing note reviewed.   Constitutional:       Appearance: Normal appearance.      Comments: Pleasant, Neatly groomed, in no distress.   Neck:      Vascular: No carotid bruit.   Cardiovascular:      Rate and Rhythm: Normal rate and regular rhythm.   Pulmonary:      Effort: Pulmonary effort is normal.      Breath sounds: Normal breath sounds.   Neurological:      Mental Status: He is alert and oriented to person, place, and time.   Psychiatric:         Mood and Affect: Mood normal.         Behavior: Behavior normal.           Problem List Items Addressed This Visit        Cardiovascular and Mediastinum    Benign essential hypertension - Primary    Relevant Medications    lisinopril-hydrochlorothiazide (PRINZIDE,ZESTORETIC) 20-12.5 MG per tablet    Hypercholesterolemia       Respiratory    Obstructive sleep apnea       Genitourinary    Overactive bladder    Relevant Medications    oxybutynin (DITROPAN) 5 MG/5ML syrup            PLAN  I am going to have him stop taking lisinopril and start taking lisinopril/hydrochlorothiazide 20/12 and a half once.    I would like him back in a month or so to recheck his blood pressure.    Anemia  appears to be well controlled.    He has obstructive sleep apnea and is compliant with use of CPAP.    Check a bladder.  I sent out a prescription for oxybutynin for him.    I told him that I would like to check a PSA of carotids upcoming appointment with me.  At that time, I will do a prostate exam.  No follow-ups on file.

## 2020-09-18 RX ORDER — METOPROLOL SUCCINATE 50 MG/1
TABLET, EXTENDED RELEASE ORAL
Qty: 30 TABLET | Refills: 2 | Status: SHIPPED | OUTPATIENT
Start: 2020-09-18 | End: 2021-02-22

## 2020-09-18 RX ORDER — AMLODIPINE BESYLATE 5 MG/1
TABLET ORAL
Qty: 30 TABLET | Refills: 2 | Status: SHIPPED | OUTPATIENT
Start: 2020-09-18 | End: 2020-09-24

## 2020-09-24 RX ORDER — AMLODIPINE BESYLATE 5 MG/1
TABLET ORAL
Qty: 30 TABLET | Refills: 0 | Status: SHIPPED | OUTPATIENT
Start: 2020-09-24 | End: 2020-10-22 | Stop reason: SDUPTHER

## 2020-09-29 DIAGNOSIS — Z12.5 ENCOUNTER FOR SCREENING FOR MALIGNANT NEOPLASM OF PROSTATE: Primary | ICD-10-CM

## 2020-10-08 LAB — PSA SERPL-MCNC: 3.96 NG/ML (ref 0–4)

## 2020-10-14 ENCOUNTER — OFFICE VISIT (OUTPATIENT)
Dept: FAMILY MEDICINE CLINIC | Facility: CLINIC | Age: 65
End: 2020-10-14

## 2020-10-14 VITALS
HEART RATE: 72 BPM | TEMPERATURE: 98.9 F | SYSTOLIC BLOOD PRESSURE: 154 MMHG | BODY MASS INDEX: 29.2 KG/M2 | RESPIRATION RATE: 18 BRPM | DIASTOLIC BLOOD PRESSURE: 80 MMHG | WEIGHT: 215.6 LBS | OXYGEN SATURATION: 97 % | HEIGHT: 72 IN

## 2020-10-14 DIAGNOSIS — Z23 ENCOUNTER FOR IMMUNIZATION: Primary | ICD-10-CM

## 2020-10-14 DIAGNOSIS — I10 BENIGN ESSENTIAL HYPERTENSION: ICD-10-CM

## 2020-10-14 PROCEDURE — 90694 VACC AIIV4 NO PRSRV 0.5ML IM: CPT | Performed by: INTERNAL MEDICINE

## 2020-10-14 PROCEDURE — G0008 ADMIN INFLUENZA VIRUS VAC: HCPCS | Performed by: INTERNAL MEDICINE

## 2020-10-14 PROCEDURE — 99214 OFFICE O/P EST MOD 30 MIN: CPT | Performed by: INTERNAL MEDICINE

## 2020-10-14 RX ORDER — AMLODIPINE BESYLATE 10 MG/1
5 TABLET ORAL DAILY
Qty: 30 TABLET | Refills: 3 | Status: CANCELLED | OUTPATIENT
Start: 2020-10-14

## 2020-10-22 ENCOUNTER — OFFICE VISIT (OUTPATIENT)
Dept: FAMILY MEDICINE CLINIC | Facility: CLINIC | Age: 65
End: 2020-10-22

## 2020-10-22 VITALS
RESPIRATION RATE: 16 BRPM | HEART RATE: 74 BPM | OXYGEN SATURATION: 100 % | BODY MASS INDEX: 29.77 KG/M2 | HEIGHT: 72 IN | SYSTOLIC BLOOD PRESSURE: 150 MMHG | WEIGHT: 219.8 LBS | DIASTOLIC BLOOD PRESSURE: 80 MMHG | TEMPERATURE: 98.4 F

## 2020-10-22 DIAGNOSIS — Z23 NEED FOR PNEUMOCOCCAL VACCINATION: ICD-10-CM

## 2020-10-22 DIAGNOSIS — Z00.00 MEDICARE WELCOME EXAM: Primary | ICD-10-CM

## 2020-10-22 DIAGNOSIS — I10 BENIGN ESSENTIAL HYPERTENSION: ICD-10-CM

## 2020-10-22 PROCEDURE — G0402 INITIAL PREVENTIVE EXAM: HCPCS | Performed by: NURSE PRACTITIONER

## 2020-10-22 PROCEDURE — 90732 PPSV23 VACC 2 YRS+ SUBQ/IM: CPT | Performed by: NURSE PRACTITIONER

## 2020-10-22 PROCEDURE — G0009 ADMIN PNEUMOCOCCAL VACCINE: HCPCS | Performed by: NURSE PRACTITIONER

## 2020-10-22 RX ORDER — AMLODIPINE BESYLATE 10 MG/1
5 TABLET ORAL DAILY
Qty: 30 TABLET | Refills: 2 | Status: SHIPPED | OUTPATIENT
Start: 2020-10-22 | End: 2020-10-26

## 2020-10-22 NOTE — PROGRESS NOTES
The ABCs of the Annual Wellness Visit  [facility]    Chief Complaint   Patient presents with   • Welcome To Medicare       Subjective   History of Present Illness:  Jacob Miller is a 65 y.o. male who presents for a  Welcome to Medicare Visit.    HEALTH RISK ASSESSMENT    Recent Hospitalizations:  No hospitalization(s) within the last year.    Current Medical Providers:  Patient Care Team:  Erik Benitez MD as PCP - General    Smoking Status:  Social History     Tobacco Use   Smoking Status Never Smoker   Smokeless Tobacco Never Used   Tobacco Comment    Daily caffeine use       Alcohol Consumption:  Social History     Substance and Sexual Activity   Alcohol Use Yes    Comment: occ       Depression Screen:   PHQ-2/PHQ-9 Depression Screening 10/22/2020   Little interest or pleasure in doing things 0   Feeling down, depressed, or hopeless 0   Total Score 0       Fall Risk Screen:  MORENO Fall Risk Assessment was completed, and patient is at LOW risk for falls.Assessment completed on:10/22/2020    Health Habits and Functional and Cognitive Screening:  Functional & Cognitive Status 10/22/2020   Do you have difficulty preparing food and eating? No   Do you have difficulty bathing yourself, getting dressed or grooming yourself? No   Do you have difficulty using the toilet? No   Do you have difficulty moving around from place to place? No   Do you have trouble with steps or getting out of a bed or a chair? No   Current Diet Limited Junk Food   Dental Exam Up to date   Eye Exam Up to date   Exercise (times per week) 3 times per week   Current Exercise Activities Include Walking   Do you need help using the phone?  No   Are you deaf or do you have serious difficulty hearing?  No   Do you need help with transportation? No   Do you need help shopping? No   Do you need help preparing meals?  No   Do you need help with housework?  No   Do you need help with laundry? No   Do you need help taking your  medications? No   Do you need help managing money? No   Do you ever drive or ride in a car without wearing a seat belt? No         Does the patient have evidence of cognitive impairment? No    Asprin use counseling:Does not need ASA (and currently is not on it)    Visual Acuity:  One year ago had new lens placed, has opthamologist    Age-appropriate Screening Schedule:  Refer to the list below for future screening recommendations based on patient's age, sex and/or medical conditions. Orders for these recommended tests are listed in the plan section. The patient has been provided with a written plan.    Health Maintenance   Topic Date Due   • LIPID PANEL  09/10/2021   • COLONOSCOPY  05/01/2026   • TDAP/TD VACCINES (2 - Td) 06/24/2026   • INFLUENZA VACCINE  Completed   • ZOSTER VACCINE  Completed          The following portions of the patient's history were reviewed and updated as appropriate: allergies, current medications, past family history, past medical history, past social history, past surgical history and problem list.    Outpatient Medications Prior to Visit   Medication Sig Dispense Refill   • diclofenac (VOLTAREN) 75 MG EC tablet TAKE ONE TABLET BY MOUTH TWICE A DAY AS NEEDED FOR PAIN (Patient taking differently: Daily.) 60 tablet 1   • glucose blood test strip Use as instructed- 1QD 30 each 12   • lisinopril-hydrochlorothiazide (PRINZIDE,ZESTORETIC) 20-12.5 MG per tablet Take 1 tablet by mouth Daily. 30 tablet 11   • metoprolol succinate XL (TOPROL-XL) 50 MG 24 hr tablet TAKE ONE TABLET BY MOUTH DAILY 30 tablet 2   • simvastatin (ZOCOR) 40 MG tablet Take 1 tablet by mouth every night at bedtime. 30 tablet 0   • amLODIPine (NORVASC) 5 MG tablet TAKE ONE TABLET BY MOUTH DAILY 30 tablet 0     No facility-administered medications prior to visit.        Patient Active Problem List   Diagnosis   • Benign essential hypertension   • Hypercholesterolemia   • Obstructive sleep apnea   • Hyperglycemia   • Actinic  "keratosis   • Benign non-nodular prostatic hyperplasia without lower urinary tract symptoms   • Osteoarthritis of both knees   • Palpitations   • Biceps tendinitis on left   • Overweight (BMI 25.0-29.9)   • Overactive bladder       Advanced Care Planning:  ACP discussion was held with the patient during this visit. Patient does not have an advance directive, information provided.    Review of Systems   Constitutional: Negative.    Respiratory: Negative.    Cardiovascular: Negative.        Compared to one year ago, the patient feels his physical health is worse.; fatigue, aches  Compared to one year ago, the patient feels his mental health is the same.    Reviewed chart for potential of high risk medication in the elderly: yes  Reviewed chart for potential of harmful drug interactions in the elderly:yes    Objective         Vitals:    10/22/20 0806   BP: 150/80   BP Location: Left arm   Patient Position: Sitting   Cuff Size: Adult   Pulse: 74   Resp: 16   Temp: 98.4 °F (36.9 °C)   SpO2: 100%   Weight: 99.7 kg (219 lb 12.8 oz)   Height: 182.9 cm (72.01\")       Body mass index is 29.8 kg/m².  Discussed the patient's BMI with him. The BMI is above average; BMI management plan is completed.    Physical Exam  Vitals signs reviewed.   HENT:      Head: Normocephalic.   Eyes:      Pupils: Pupils are equal, round, and reactive to light.   Cardiovascular:      Rate and Rhythm: Normal rate and regular rhythm.   Pulmonary:      Effort: Pulmonary effort is normal.      Breath sounds: Normal breath sounds.   Musculoskeletal: Normal range of motion.   Neurological:      Mental Status: He is alert and oriented to person, place, and time.         Lab Results   Component Value Date     (H) 09/10/2020    CHLPL 108 09/10/2020    TRIG 141 09/10/2020    HDL 24 (L) 09/10/2020    LDL 56 09/10/2020    VLDL 28.2 09/10/2020    HGBA1C 5.80 (H) 09/10/2020     Pt stating he does have a hx of white coat syndrome; normally when he checks b/p " at home he runs 130's-140's/70's     Assessment/Plan   Medicare Risks and Personalized Health Plan  CMS Preventative Services Quick Reference  Advance Directive Discussion  Immunizations Discussed/Encouraged (specific immunizations; Pneumococcal 23 )    The above risks/problems have been discussed with the patient.  Pertinent information has been shared with the patient in the After Visit Summary.  Follow up plans and orders are seen below in the Assessment/Plan Section.    Diagnoses and all orders for this visit:    1. Medicare welcome exam (Primary)    2. Need for pneumococcal vaccination  -     Pneumococcal Polysaccharide Vaccine 23-Valent (PPSV23) Greater Than or Equal To 1yo Subcutaneous / IM    3. Benign essential hypertension  -     amLODIPine (NORVASC) 10 MG tablet; Take 0.5 tablets by mouth Daily.  Dispense: 30 tablet; Refill: 2        Follow Up:  Return for schedule follow up HTN visit in one week.     An After Visit Summary and PPPS were given to the patient.

## 2020-10-26 DIAGNOSIS — I10 BENIGN ESSENTIAL HYPERTENSION: Primary | ICD-10-CM

## 2020-10-26 RX ORDER — AMLODIPINE BESYLATE 5 MG/1
5 TABLET ORAL DAILY
Qty: 30 TABLET | Refills: 2 | Status: SHIPPED | OUTPATIENT
Start: 2020-10-26 | End: 2020-12-31 | Stop reason: DRUGHIGH

## 2020-11-06 NOTE — PROGRESS NOTES
Subjective   Jacob Miller is a 65 y.o. male. Patient is here today for   Chief Complaint   Patient presents with   • Hypertension     f/u          Vitals:    10/14/20 1524   BP: 154/80   Pulse: 72   Resp: 18   Temp: 98.9 °F (37.2 °C)   SpO2: 97%     Body mass index is 29.23 kg/m².      Past Medical History:   Diagnosis Date   • Hyperlipidemia    • Hypertension    • Kidney stones    • PAC (premature atrial contraction)    • Palpitations    • Sleep apnea     USES C-PAP MACHINE      No Known Allergies   Social History     Socioeconomic History   • Marital status:      Spouse name: Not on file   • Number of children: Not on file   • Years of education: Not on file   • Highest education level: Not on file   Tobacco Use   • Smoking status: Never Smoker   • Smokeless tobacco: Never Used   • Tobacco comment: Daily caffeine use   Substance and Sexual Activity   • Alcohol use: Yes     Comment: occ   • Drug use: No        Current Outpatient Medications:   •  diclofenac (VOLTAREN) 75 MG EC tablet, TAKE ONE TABLET BY MOUTH TWICE A DAY AS NEEDED FOR PAIN (Patient taking differently: Daily.), Disp: 60 tablet, Rfl: 1  •  glucose blood test strip, Use as instructed- 1QD, Disp: 30 each, Rfl: 12  •  lisinopril-hydrochlorothiazide (PRINZIDE,ZESTORETIC) 20-12.5 MG per tablet, Take 1 tablet by mouth Daily., Disp: 30 tablet, Rfl: 11  •  metoprolol succinate XL (TOPROL-XL) 50 MG 24 hr tablet, TAKE ONE TABLET BY MOUTH DAILY, Disp: 30 tablet, Rfl: 2  •  simvastatin (ZOCOR) 40 MG tablet, Take 1 tablet by mouth every night at bedtime., Disp: 30 tablet, Rfl: 0  •  amLODIPine (Norvasc) 5 MG tablet, Take 1 tablet by mouth Daily., Disp: 30 tablet, Rfl: 2     Objective     Here today for follow-up on hypertension.    He has no complaints.    He is due for flu shot.    Hypertension         Review of Systems   Constitutional: Negative.    HENT: Negative.    Respiratory: Negative.    Cardiovascular: Negative.    Musculoskeletal: Negative.     Psychiatric/Behavioral: Negative.        Physical Exam  Vitals signs and nursing note reviewed.   Constitutional:       Appearance: Normal appearance.      Comments: Pleasant, neatly groomed, in no distress.   HENT:      Head: Normocephalic.   Cardiovascular:      Rate and Rhythm: Normal rate and regular rhythm.      Heart sounds: Normal heart sounds. No murmur. No gallop.    Pulmonary:      Effort: Pulmonary effort is normal. No respiratory distress.      Breath sounds: Normal breath sounds. No wheezing or rales.   Neurological:      Mental Status: He is alert and oriented to person, place, and time.   Psychiatric:         Mood and Affect: Mood normal.         Behavior: Behavior normal.         Thought Content: Thought content normal.           Problems Addressed this Visit        Cardiovascular and Mediastinum    Benign essential hypertension      Other Visit Diagnoses     Encounter for immunization    -  Primary    Relevant Orders    Fluad Quad 65+ yrs ([id number]) (Completed)      Diagnoses       Codes Comments    Encounter for immunization    -  Primary ICD-10-CM: Z23  ICD-9-CM: V03.89     Benign essential hypertension     ICD-10-CM: I10  ICD-9-CM: 401.1             PLAN  When I checked his blood pressure I got 136/84.    I asked him to double his lisinopril/hydrochlorothiazide to 40/25 every morning.    His appointment to come back and see me in December at which point obviously order to recheck his blood pressure.    We gave him a flu shot today.  No follow-ups on file.

## 2020-12-16 ENCOUNTER — OFFICE VISIT (OUTPATIENT)
Dept: FAMILY MEDICINE CLINIC | Facility: CLINIC | Age: 65
End: 2020-12-16

## 2020-12-16 VITALS
SYSTOLIC BLOOD PRESSURE: 132 MMHG | DIASTOLIC BLOOD PRESSURE: 70 MMHG | TEMPERATURE: 96.9 F | WEIGHT: 222 LBS | OXYGEN SATURATION: 100 % | HEIGHT: 72 IN | HEART RATE: 61 BPM | RESPIRATION RATE: 18 BRPM | BODY MASS INDEX: 30.07 KG/M2

## 2020-12-16 DIAGNOSIS — I10 BENIGN ESSENTIAL HYPERTENSION: Primary | ICD-10-CM

## 2020-12-16 PROCEDURE — 99213 OFFICE O/P EST LOW 20 MIN: CPT | Performed by: INTERNAL MEDICINE

## 2020-12-16 RX ORDER — AMLODIPINE BESYLATE 10 MG/1
10 TABLET ORAL DAILY
COMMUNITY
Start: 2020-12-10 | End: 2020-12-17 | Stop reason: SDUPTHER

## 2020-12-16 RX ORDER — TAMSULOSIN HYDROCHLORIDE 0.4 MG/1
CAPSULE ORAL
COMMUNITY
Start: 2020-11-23 | End: 2020-12-31

## 2020-12-16 RX ORDER — SULFAMETHOXAZOLE AND TRIMETHOPRIM 800; 160 MG/1; MG/1
TABLET ORAL
COMMUNITY
Start: 2020-11-02 | End: 2020-12-31

## 2020-12-16 NOTE — TELEPHONE ENCOUNTER
PT ASKED TO HAVE THE FOLLOWING  RX CALLED TO THE PHARMACY TO EXPLAIN THE MG. HAS BEEN CHANGED.    amLODIPine (NORVASC) 10 MG tablet     Take 10 mg by mouth Daily. #?    [facility] [id number] [place] [address] - [phone]  - [phone] FX    PT CONTACT #[phone]

## 2020-12-17 RX ORDER — AMLODIPINE BESYLATE 10 MG/1
10 TABLET ORAL DAILY
Qty: 90 TABLET | Refills: 1 | Status: SHIPPED | OUTPATIENT
Start: 2020-12-17 | End: 2021-01-21 | Stop reason: SDUPTHER

## 2020-12-17 NOTE — TELEPHONE ENCOUNTER
Pharmacy informed. I was going to call in verbally but had to attend to a pt. I will send Dr. Benitez a request to send to pharmacy.

## 2020-12-31 NOTE — PROGRESS NOTES
Subjective   Jacob Miller is a 65 y.o. male. Patient is here today for   Chief Complaint   Patient presents with   • Hypertension     f/u           Vitals:    12/16/20 1421   BP: 132/70   Pulse: 61   Resp: 18   Temp: 96.9 °F (36.1 °C)   SpO2: 100%     Body mass index is 30.1 kg/m².      Past Medical History:   Diagnosis Date   • Hyperlipidemia    • Hypertension    • Kidney stones    • PAC (premature atrial contraction)    • Palpitations    • Sleep apnea     USES C-PAP MACHINE      No Known Allergies   Social History     Socioeconomic History   • Marital status:      Spouse name: Not on file   • Number of children: Not on file   • Years of education: Not on file   • Highest education level: Not on file   Tobacco Use   • Smoking status: Never Smoker   • Smokeless tobacco: Never Used   • Tobacco comment: Daily caffeine use   Substance and Sexual Activity   • Alcohol use: Yes     Comment: occ   • Drug use: No        Current Outpatient Medications:   •  diclofenac (VOLTAREN) 75 MG EC tablet, TAKE ONE TABLET BY MOUTH TWICE A DAY AS NEEDED FOR PAIN (Patient taking differently: Daily.), Disp: 60 tablet, Rfl: 1  •  glucose blood test strip, Use as instructed- 1QD, Disp: 30 each, Rfl: 12  •  lisinopril-hydrochlorothiazide (PRINZIDE,ZESTORETIC) 20-12.5 MG per tablet, Take 1 tablet by mouth Daily., Disp: 30 tablet, Rfl: 11  •  metoprolol succinate XL (TOPROL-XL) 50 MG 24 hr tablet, TAKE ONE TABLET BY MOUTH DAILY, Disp: 30 tablet, Rfl: 2  •  simvastatin (ZOCOR) 40 MG tablet, Take 1 tablet by mouth every night at bedtime., Disp: 30 tablet, Rfl: 0  •  amLODIPine (NORVASC) 10 MG tablet, Take 1 tablet by mouth Daily., Disp: 90 tablet, Rfl: 1  •  amLODIPine (Norvasc) 5 MG tablet, Take 1 tablet by mouth Daily., Disp: 30 tablet, Rfl: 2  •  sulfamethoxazole-trimethoprim (BACTRIM DS,SEPTRA DS) 800-160 MG per tablet, , Disp: , Rfl:   •  tamsulosin (FLOMAX) 0.4 MG capsule 24 hr capsule, , Disp: , Rfl:      Objective     He is  here today to follow-up on hypertension.    He has no complaints.    Hypertension         Review of Systems   Constitutional: Negative.    Eyes: Negative.    Respiratory: Negative.    Cardiovascular: Negative.    Musculoskeletal: Negative.    Psychiatric/Behavioral: Negative.    All other systems reviewed and are negative.      Physical Exam  Vitals signs and nursing note reviewed.   Constitutional:       Appearance: Normal appearance. He is not ill-appearing or diaphoretic.      Comments: Pleasant, neatly groomed, no distress.   Neck:      Vascular: No carotid bruit.   Cardiovascular:      Rate and Rhythm: Normal rate and regular rhythm.      Heart sounds: Normal heart sounds. No murmur. No gallop.    Neurological:      Mental Status: He is alert and oriented to person, place, and time.   Psychiatric:         Mood and Affect: Mood normal.         Behavior: Behavior normal.         Thought Content: Thought content normal.           Problems Addressed this Visit        Cardiac and Vasculature    Benign essential hypertension - Primary      Diagnoses       Codes Comments    Benign essential hypertension    -  Primary ICD-10-CM: I10  ICD-9-CM: 401.1             PLAN  His hypertension appears to be well controlled.    I asked him to follow-up in about 4 months.    He should follow-up for a Medicare wellness visit once yearly.  No follow-ups on file.

## 2021-01-20 NOTE — TELEPHONE ENCOUNTER
Caller: Jacob Miller    Relationship: Self    Best call back number: [phone]     Medication needed:   amLODIPine (NORVASC) 10 MG tablet    When do you need the refill by: 1-20-21    What details did the patient provide when requesting the medication: PATIENT STATED THAT HIS PRESCRIPTION IS WRONG. IT SAYS TAKE HALF A TABLET A DAY WHEN HE THOUGHT IT WAS INCREASED TO TAKING A WHOLE 10MG A DAY    Does the patient have less than a 3 day supply:  [x] Yes  [] No    What is the patient's preferred pharmacy: [facility] [id number] [place] [address] [phone] [facility] [phone] FX

## 2021-01-21 RX ORDER — AMLODIPINE BESYLATE 10 MG/1
10 TABLET ORAL DAILY
Qty: 90 TABLET | Refills: 1 | Status: SHIPPED | OUTPATIENT
Start: 2021-01-21 | End: 2022-03-09

## 2021-01-26 RX ORDER — DICLOFENAC SODIUM 75 MG/1
75 TABLET, DELAYED RELEASE ORAL DAILY
Qty: 30 TABLET | Refills: 1 | Status: SHIPPED | OUTPATIENT
Start: 2021-01-26 | End: 2021-02-09 | Stop reason: SDUPTHER

## 2021-02-08 NOTE — TELEPHONE ENCOUNTER
Caller: Jacob Miller    Relationship: Self    Best call back number: [phone] (M)    What medications are you currently taking:   Current Outpatient Medications on File Prior to Visit   Medication Sig Dispense Refill   • amLODIPine (NORVASC) 10 MG tablet Take 1 tablet by mouth Daily. 90 tablet 1   • diclofenac (VOLTAREN) 75 MG EC tablet Take 1 tablet by mouth Daily. 30 tablet 1   • glucose blood test strip Use as instructed- 1QD 30 each 12   • lisinopril-hydrochlorothiazide (PRINZIDE,ZESTORETIC) 20-12.5 MG per tablet Take 1 tablet by mouth Daily. 30 tablet 11   • metoprolol succinate XL (TOPROL-XL) 50 MG 24 hr tablet TAKE ONE TABLET BY MOUTH DAILY 30 tablet 2   • simvastatin (ZOCOR) 40 MG tablet Take 1 tablet by mouth every night at bedtime. 30 tablet 0     No current facility-administered medications on file prior to visit.         What are your concerns: PATIENT CALLED AND STATES THAT HE HAS BEEN PRESCRIBED DICLOFENAC 2 X DAY, BUT PATIENT STATES THAT HE IS ONLY TAKING 1 X PER DAY. PATIENT WOULD LIKE TO TAKE 1 X PER DAY AND USE THE DICLOFENAC GEL ALSO. PLEASE CONTACT TO ADVISE.         THANKS

## 2021-02-09 RX ORDER — DICLOFENAC SODIUM 75 MG/1
75 TABLET, DELAYED RELEASE ORAL DAILY
Qty: 30 TABLET | Refills: 1 | Status: SHIPPED | OUTPATIENT
Start: 2021-02-09 | End: 2021-06-02

## 2021-02-12 RX ORDER — SIMVASTATIN 40 MG
TABLET ORAL
Qty: 30 TABLET | Refills: 5 | Status: SHIPPED | OUTPATIENT
Start: 2021-02-12 | End: 2022-03-24

## 2021-02-22 RX ORDER — METOPROLOL SUCCINATE 50 MG/1
TABLET, EXTENDED RELEASE ORAL
Qty: 90 TABLET | Refills: 3 | Status: SHIPPED | OUTPATIENT
Start: 2021-02-22 | End: 2022-03-24

## 2021-03-18 DIAGNOSIS — R73.9 HYPERGLYCEMIA: Primary | ICD-10-CM

## 2021-03-19 ENCOUNTER — BULK ORDERING (OUTPATIENT)
Dept: CASE MANAGEMENT | Facility: OTHER | Age: 66
End: 2021-03-19

## 2021-03-19 DIAGNOSIS — Z23 IMMUNIZATION DUE: ICD-10-CM

## 2021-03-22 RX ORDER — BLOOD SUGAR DIAGNOSTIC
STRIP MISCELLANEOUS
Qty: 100 EACH | Refills: 11 | Status: SHIPPED | OUTPATIENT
Start: 2021-03-22

## 2021-04-05 RX ORDER — LISINOPRIL AND HYDROCHLOROTHIAZIDE 20; 12.5 MG/1; MG/1
1 TABLET ORAL DAILY
Qty: 30 TABLET | Refills: 11 | Status: SHIPPED | OUTPATIENT
Start: 2021-04-05 | End: 2021-06-15 | Stop reason: SDUPTHER

## 2021-04-13 ENCOUNTER — LAB (OUTPATIENT)
Dept: FAMILY MEDICINE CLINIC | Facility: CLINIC | Age: 66
End: 2021-04-13

## 2021-04-13 DIAGNOSIS — E78.00 HYPERCHOLESTEROLEMIA: Primary | ICD-10-CM

## 2021-04-13 DIAGNOSIS — I10 BENIGN ESSENTIAL HYPERTENSION: ICD-10-CM

## 2021-04-13 DIAGNOSIS — R73.9 HYPERGLYCEMIA: ICD-10-CM

## 2021-04-14 LAB
ALBUMIN SERPL-MCNC: 4.5 G/DL (ref 3.5–5.2)
ALBUMIN/GLOB SERPL: 2.1 G/DL
ALP SERPL-CCNC: 74 U/L (ref 39–117)
ALT SERPL-CCNC: 42 U/L (ref 1–41)
APPEARANCE UR: CLEAR
AST SERPL-CCNC: 25 U/L (ref 1–40)
BACTERIA #/AREA URNS HPF: NORMAL /HPF
BASOPHILS # BLD AUTO: 0.02 10*3/MM3 (ref 0–0.2)
BASOPHILS NFR BLD AUTO: 0.4 % (ref 0–1.5)
BILIRUB SERPL-MCNC: 0.5 MG/DL (ref 0–1.2)
BILIRUB UR QL STRIP: NEGATIVE
BUN SERPL-MCNC: 18 MG/DL (ref 8–23)
BUN/CREAT SERPL: 19.4 (ref 7–25)
CALCIUM SERPL-MCNC: 9.3 MG/DL (ref 8.6–10.5)
CASTS URNS MICRO: NORMAL
CHLORIDE SERPL-SCNC: 103 MMOL/L (ref 98–107)
CHOLEST SERPL-MCNC: 101 MG/DL (ref 0–200)
CO2 SERPL-SCNC: 29.6 MMOL/L (ref 22–29)
COLOR UR: ABNORMAL
CREAT SERPL-MCNC: 0.93 MG/DL (ref 0.76–1.27)
EOSINOPHIL # BLD AUTO: 0.12 10*3/MM3 (ref 0–0.4)
EOSINOPHIL NFR BLD AUTO: 2.3 % (ref 0.3–6.2)
EPI CELLS #/AREA URNS HPF: NORMAL /HPF
ERYTHROCYTE [DISTWIDTH] IN BLOOD BY AUTOMATED COUNT: 15.5 % (ref 12.3–15.4)
GLOBULIN SER CALC-MCNC: 2.1 GM/DL
GLUCOSE SERPL-MCNC: 97 MG/DL (ref 65–99)
GLUCOSE UR QL: NEGATIVE
HBA1C MFR BLD: 5.7 % (ref 4.8–5.6)
HCT VFR BLD AUTO: 43 % (ref 37.5–51)
HDLC SERPL-MCNC: 28 MG/DL (ref 40–60)
HGB BLD-MCNC: 14 G/DL (ref 13–17.7)
HGB UR QL STRIP: NEGATIVE
IMM GRANULOCYTES # BLD AUTO: 0.03 10*3/MM3 (ref 0–0.05)
IMM GRANULOCYTES NFR BLD AUTO: 0.6 % (ref 0–0.5)
KETONES UR QL STRIP: NEGATIVE
LDLC SERPL CALC-MCNC: 50 MG/DL (ref 0–100)
LDLC/HDLC SERPL: 1.71 {RATIO}
LEUKOCYTE ESTERASE UR QL STRIP: NEGATIVE
LYMPHOCYTES # BLD AUTO: 1.14 10*3/MM3 (ref 0.7–3.1)
LYMPHOCYTES NFR BLD AUTO: 21.5 % (ref 19.6–45.3)
MCH RBC QN AUTO: 28.5 PG (ref 26.6–33)
MCHC RBC AUTO-ENTMCNC: 32.6 G/DL (ref 31.5–35.7)
MCV RBC AUTO: 87.6 FL (ref 79–97)
MONOCYTES # BLD AUTO: 0.83 10*3/MM3 (ref 0.1–0.9)
MONOCYTES NFR BLD AUTO: 15.7 % (ref 5–12)
NEUTROPHILS # BLD AUTO: 3.16 10*3/MM3 (ref 1.7–7)
NEUTROPHILS NFR BLD AUTO: 59.5 % (ref 42.7–76)
NITRITE UR QL STRIP: NEGATIVE
NRBC BLD AUTO-RTO: 0 /100 WBC (ref 0–0.2)
PH UR STRIP: 6.5 [PH] (ref 5–8)
PLATELET # BLD AUTO: 144 10*3/MM3 (ref 140–450)
POTASSIUM SERPL-SCNC: 3.7 MMOL/L (ref 3.5–5.2)
PROT SERPL-MCNC: 6.6 G/DL (ref 6–8.5)
PROT UR QL STRIP: NEGATIVE
RBC # BLD AUTO: 4.91 10*6/MM3 (ref 4.14–5.8)
RBC #/AREA URNS HPF: NORMAL /HPF
SODIUM SERPL-SCNC: 140 MMOL/L (ref 136–145)
SP GR UR: 1.02 (ref 1–1.03)
TRIGL SERPL-MCNC: 125 MG/DL (ref 0–150)
TSH SERPL DL<=0.005 MIU/L-ACNC: 1.54 UIU/ML (ref 0.27–4.2)
UROBILINOGEN UR STRIP-MCNC: ABNORMAL MG/DL
VLDLC SERPL CALC-MCNC: 23 MG/DL (ref 5–40)
WBC # BLD AUTO: 5.3 10*3/MM3 (ref 3.4–10.8)
WBC #/AREA URNS HPF: NORMAL /HPF

## 2021-04-20 ENCOUNTER — OFFICE VISIT (OUTPATIENT)
Dept: FAMILY MEDICINE CLINIC | Facility: CLINIC | Age: 66
End: 2021-04-20

## 2021-04-20 VITALS
WEIGHT: 220.6 LBS | DIASTOLIC BLOOD PRESSURE: 82 MMHG | HEIGHT: 72 IN | BODY MASS INDEX: 29.88 KG/M2 | SYSTOLIC BLOOD PRESSURE: 140 MMHG | HEART RATE: 70 BPM | RESPIRATION RATE: 18 BRPM | TEMPERATURE: 96.8 F | OXYGEN SATURATION: 97 %

## 2021-04-20 DIAGNOSIS — E78.00 HYPERCHOLESTEROLEMIA: Primary | ICD-10-CM

## 2021-04-20 DIAGNOSIS — G47.33 OBSTRUCTIVE SLEEP APNEA: ICD-10-CM

## 2021-04-20 DIAGNOSIS — R73.9 HYPERGLYCEMIA: ICD-10-CM

## 2021-04-20 DIAGNOSIS — I10 BENIGN ESSENTIAL HYPERTENSION: ICD-10-CM

## 2021-04-20 PROCEDURE — 99214 OFFICE O/P EST MOD 30 MIN: CPT | Performed by: INTERNAL MEDICINE

## 2021-04-20 NOTE — PROGRESS NOTES
Subjective   Jacob Miller is a 65 y.o. male. Patient is here today for   Chief Complaint   Patient presents with   • Hypertension     HYPERCHOLESTEROLEMIA- FOLLOW UP LABS          Vitals:    04/20/21 0805   BP: 140/82   Pulse: 70   Resp: 18   Temp: 96.8 °F (36 °C)   SpO2: 97%     Body mass index is 29.91 kg/m².      Past Medical History:   Diagnosis Date   • Hyperlipidemia    • Hypertension    • Kidney stones    • PAC (premature atrial contraction)    • Palpitations    • Sleep apnea     USES C-PAP MACHINE      No Known Allergies   Social History     Socioeconomic History   • Marital status:      Spouse name: Not on file   • Number of children: Not on file   • Years of education: Not on file   • Highest education level: Not on file   Tobacco Use   • Smoking status: Never Smoker   • Smokeless tobacco: Never Used   • Tobacco comment: Daily caffeine use   Substance and Sexual Activity   • Alcohol use: Yes     Comment: occ   • Drug use: No        Current Outpatient Medications:   •  amLODIPine (NORVASC) 10 MG tablet, Take 1 tablet by mouth Daily., Disp: 90 tablet, Rfl: 1  •  diclofenac (VOLTAREN) 75 MG EC tablet, Take 1 tablet by mouth Daily., Disp: 30 tablet, Rfl: 1  •  Diclofenac Sodium (VOLTAREN) 1 % gel gel, Apply 4 g topically to the appropriate area as directed 4 (Four) Times a Day As Needed (joint pain)., Disp: 100 g, Rfl: 2  •  lisinopril-hydrochlorothiazide (PRINZIDE,ZESTORETIC) 20-12.5 MG per tablet, Take 1 tablet by mouth Daily., Disp: 30 tablet, Rfl: 11  •  metoprolol succinate XL (TOPROL-XL) 50 MG 24 hr tablet, TAKE ONE TABLET BY MOUTH DAILY, Disp: 90 tablet, Rfl: 3  •  OneTouch Ultra test strip, USE TO TEST FOR BLOOD SUGAR LEVELS ONCE DAILY AS DIRECTED, Disp: 100 each, Rfl: 11  •  simvastatin (ZOCOR) 40 MG tablet, TAKE ONE TABLET BY MOUTH NIGHTLY, Disp: 30 tablet, Rfl: 5     Objective     This patient is here to follow-up on labs from last week and hypertension.    He tells me that he feels  well.    He has intentionally lost about 12 pounds since I saw him last.    He has obstructive sleep apnea and is compliant with use of CPAP.    He checks his blood pressures at home and a tend to be in the neighborhood of systolic blood pressure is 130 diastolic blood pressures less than 80.  Occasionally gets systolic numbers of less than 120.    Hypertension         Review of Systems   Constitutional: Negative.    HENT: Negative.    Respiratory: Negative.    Cardiovascular: Negative.    Musculoskeletal: Negative.    Psychiatric/Behavioral: Negative.        Physical Exam  Vitals and nursing note reviewed.   Constitutional:       Appearance: Normal appearance. He is not ill-appearing or diaphoretic.      Comments: Pleasant, neatly groomed, in no distress.   Neck:      Vascular: No carotid bruit.   Cardiovascular:      Rate and Rhythm: Regular rhythm.      Heart sounds: Normal heart sounds. No murmur heard.   No gallop.    Pulmonary:      Effort: Pulmonary effort is normal.      Breath sounds: Normal breath sounds.   Neurological:      Mental Status: He is alert and oriented to person, place, and time.   Psychiatric:         Mood and Affect: Mood normal.         Behavior: Behavior normal.         Thought Content: Thought content normal.           Problems Addressed this Visit        Cardiac and Vasculature    Benign essential hypertension    Hypercholesterolemia - Primary       Endocrine and Metabolic    Hyperglycemia       Sleep    Obstructive sleep apnea      Diagnoses       Codes Comments    Hypercholesterolemia    -  Primary ICD-10-CM: E78.00  ICD-9-CM: 272.0     Benign essential hypertension     ICD-10-CM: I10  ICD-9-CM: 401.1     Hyperglycemia     ICD-10-CM: R73.9  ICD-9-CM: 790.29     Obstructive sleep apnea     ICD-10-CM: G47.33  ICD-9-CM: 327.23             PLAN  His hypertension is relatively well controlled.  He is on metoprolol XL 50 mg once daily, lisinopril hydrochlorothiazide 20/12-1/2 once daily,  amlodipine 10 mg daily.  He is compliant with use of CPAP as well.    He is overweight with a BMI of 30.  Of asked him to do his best to lose weight via regular aerobic activity per American Heart Association guidelines and decrease caloric intake.    His glucose is normal his hemoglobin A1c is marginally elevated at 5.7%.  We will just watch that for now.  Weight loss would help.    I asked him to follow-up in about 6 months.  Fasting labs prior to that visit should include: PSA if it has not been it done in a year at that time, lipid profile, comprehensive metabolic panel, CBC, urinalysis.  No follow-ups on file.

## 2021-06-02 RX ORDER — DICLOFENAC SODIUM 75 MG/1
TABLET, DELAYED RELEASE ORAL
Qty: 30 TABLET | Refills: 0 | Status: SHIPPED | OUTPATIENT
Start: 2021-06-02 | End: 2021-07-12

## 2021-06-16 RX ORDER — LISINOPRIL AND HYDROCHLOROTHIAZIDE 20; 12.5 MG/1; MG/1
1 TABLET ORAL DAILY
Qty: 30 TABLET | Refills: 11 | Status: SHIPPED | OUTPATIENT
Start: 2021-06-16 | End: 2022-07-15

## 2021-07-12 ENCOUNTER — OFFICE VISIT (OUTPATIENT)
Dept: FAMILY MEDICINE CLINIC | Facility: CLINIC | Age: 66
End: 2021-07-12

## 2021-07-12 VITALS
WEIGHT: 222.2 LBS | RESPIRATION RATE: 18 BRPM | TEMPERATURE: 96.9 F | HEIGHT: 72 IN | SYSTOLIC BLOOD PRESSURE: 112 MMHG | HEART RATE: 75 BPM | BODY MASS INDEX: 30.1 KG/M2 | OXYGEN SATURATION: 98 % | DIASTOLIC BLOOD PRESSURE: 72 MMHG

## 2021-07-12 DIAGNOSIS — L20.9 ATOPIC DERMATITIS, UNSPECIFIED TYPE: Primary | ICD-10-CM

## 2021-07-12 PROCEDURE — 99212 OFFICE O/P EST SF 10 MIN: CPT | Performed by: NURSE PRACTITIONER

## 2021-07-12 RX ORDER — TRIAMCINOLONE ACETONIDE 0.25 MG/G
OINTMENT TOPICAL 2 TIMES DAILY
Qty: 15 G | Refills: 1 | Status: SHIPPED | OUTPATIENT
Start: 2021-07-12 | End: 2021-07-26

## 2021-07-12 RX ORDER — DICLOFENAC SODIUM 75 MG/1
TABLET, DELAYED RELEASE ORAL
Qty: 30 TABLET | Refills: 0 | Status: SHIPPED | OUTPATIENT
Start: 2021-07-12 | End: 2021-08-11

## 2021-07-12 NOTE — PROGRESS NOTES
"Subjective     Jacob Miller is a 66 y.o.. male.     Rash  This is a new problem. Episode onset: 2-3 weeks  The problem has been gradually worsening since onset. Location: juan. lower extremities. The rash is characterized by itchiness and redness. Associated with: working outside. Pertinent negatives include no fever. Treatments tried: lotion.       The following portions of the patient's history were reviewed and updated as appropriate: allergies, current medications, past family history, past medical history, past social history, past surgical history and problem list.    Past Medical History:   Diagnosis Date   • Hyperlipidemia    • Hypertension    • Kidney stones    • PAC (premature atrial contraction)    • Palpitations    • Sleep apnea     USES C-PAP MACHINE       Past Surgical History:   Procedure Laterality Date   • HERNIA REPAIR     • ROTATOR CUFF REPAIR         Review of Systems   Constitutional: Negative for fever.   Skin: Positive for rash.       No Known Allergies    Objective     Vitals:    07/12/21 1057   BP: 112/72   BP Location: Left arm   Patient Position: Sitting   Pulse: 75   Resp: 18   Temp: 96.9 °F (36.1 °C)   TempSrc: Oral   SpO2: 98%   Weight: 101 kg (222 lb 3.2 oz)   Height: 182.9 cm (72.01\")     Body mass index is 30.13 kg/m².    Physical Exam  Vitals reviewed.   HENT:      Head: Normocephalic.   Eyes:      Pupils: Pupils are equal, round, and reactive to light.   Cardiovascular:      Rate and Rhythm: Normal rate and regular rhythm.      Pulses: Normal pulses.   Pulmonary:      Effort: Pulmonary effort is normal.      Breath sounds: Normal breath sounds.   Musculoskeletal:         General: Normal range of motion.   Skin:     Comments: Lower legs juan.: red macular papular rash noted, no vesicles noted, no drainage noted, no swelling noted, scabs noted, no warmth noted, generalized to lower shin to ankle areas juan.   Neurological:      Mental Status: He is alert and oriented to person, " place, and time.   Psychiatric:         Behavior: Behavior normal.           Current Outpatient Medications:   •  amLODIPine (NORVASC) 10 MG tablet, Take 1 tablet by mouth Daily., Disp: 90 tablet, Rfl: 1  •  diclofenac (VOLTAREN) 75 MG EC tablet, TAKE ONE TABLET BY MOUTH DAILY, Disp: 30 tablet, Rfl: 0  •  lisinopril-hydrochlorothiazide (PRINZIDE,ZESTORETIC) 20-12.5 MG per tablet, Take 1 tablet by mouth Daily., Disp: 30 tablet, Rfl: 11  •  metoprolol succinate XL (TOPROL-XL) 50 MG 24 hr tablet, TAKE ONE TABLET BY MOUTH DAILY, Disp: 90 tablet, Rfl: 3  •  OneTouch Ultra test strip, USE TO TEST FOR BLOOD SUGAR LEVELS ONCE DAILY AS DIRECTED, Disp: 100 each, Rfl: 11  •  simvastatin (ZOCOR) 40 MG tablet, TAKE ONE TABLET BY MOUTH NIGHTLY, Disp: 30 tablet, Rfl: 5  •  mupirocin (Bactroban) 2 % ointment, Apply  topically to the appropriate area as directed 2 (Two) Times a Day., Disp: 22 g, Rfl: 0  •  triamcinolone (KENALOG) 0.025 % ointment, Apply  topically to the appropriate area as directed 2 (Two) Times a Day for 14 days., Disp: 15 g, Rfl: 1        Assessment/Plan   Diagnoses and all orders for this visit:    1. Atopic dermatitis, unspecified type (Primary)  -     mupirocin (Bactroban) 2 % ointment; Apply  topically to the appropriate area as directed 2 (Two) Times a Day.  Dispense: 22 g; Refill: 0  -     triamcinolone (KENALOG) 0.025 % ointment; Apply  topically to the appropriate area as directed 2 (Two) Times a Day for 14 days.  Dispense: 15 g; Refill: 1        Patient Instructions   Keep areas clean and dry, wash with mild soap  Use prescription topicals as prescribed  Call/rto if no improvement after 2 weeks and will send prednisone tabs to pharmacy      Return if symptoms worsen or fail to improve.

## 2021-07-12 NOTE — PATIENT INSTRUCTIONS
Keep areas clean and dry, wash with mild soap  Use prescription topicals as prescribed  Call/rto if no improvement after 2 weeks and will send prednisone tabs to pharmacy

## 2021-08-11 RX ORDER — DICLOFENAC SODIUM 75 MG/1
TABLET, DELAYED RELEASE ORAL
Qty: 30 TABLET | Refills: 0 | Status: SHIPPED | OUTPATIENT
Start: 2021-08-11 | End: 2021-09-08

## 2021-08-25 ENCOUNTER — TELEPHONE (OUTPATIENT)
Dept: FAMILY MEDICINE CLINIC | Facility: CLINIC | Age: 66
End: 2021-08-25

## 2021-08-25 NOTE — TELEPHONE ENCOUNTER
Caller: Jacob Miller    Relationship: Self    Best call back number: [phone]    What medication are you requesting: A NEW METER, ULTRA TOUCH 2 IS BRAND PATIENT IS CURRENTLY USING. PATIENT STATES HIS METER IS 5-6 YEARS OLD AND IS UNSURE IF METER IS ACCURATE     ACCU CHECK IS THE METER PATIENTS INSURANCE WILL COVER     If a prescription is needed, what is your preferred pharmacy and phone number: [address] [place] [address] [phone] [facility] [phone]

## 2021-08-30 NOTE — TELEPHONE ENCOUNTER
Caller: Jacob Miller    Relationship: Self    Best call back number: [phone] (H)    What medication are you requesting: GLUCOSE MONITOR    What are your current symptoms:DIABETES    How long have you been experiencing symptoms: DIABETES    Have you had these symptoms before:    [x] Yes  [] No    Have you been treated for these symptoms before:   [x] Yes  [] No    If a prescription is needed, what is your preferred pharmacy and phone number: [id number] [place] [address] [phone] [facility] [phone]      Additional notes: PATIENT CALLED BACK TO INQUIRE ABOUT THE GLUCOSE MONITOR THAT HE REQUESTED 08/25/21.      PLEASE CONTACT PATIENT TO ADVISE.     THANKS

## 2021-09-03 ENCOUNTER — TELEPHONE (OUTPATIENT)
Dept: FAMILY MEDICINE CLINIC | Facility: CLINIC | Age: 66
End: 2021-09-03

## 2021-09-03 NOTE — TELEPHONE ENCOUNTER
Hub staff attempted to follow warm transfer process and was unsuccessful     Caller: Jacob Miller    Relationship to patient: Self    Best call back number: [phone] (H)    Patient is needing: PATIENT CALLING TO CHECK THE STATUS OF REQUESTING A NEW GLUCOSE MONITOR PATIENT STATES HAS LEFT A COUPLE OF MESSAGES AND HAS NOT RECEIVED A CALLBACK

## 2021-09-08 RX ORDER — DICLOFENAC SODIUM 75 MG/1
TABLET, DELAYED RELEASE ORAL
Qty: 30 TABLET | Refills: 0 | Status: SHIPPED | OUTPATIENT
Start: 2021-09-08 | End: 2021-11-17

## 2021-09-09 NOTE — TELEPHONE ENCOUNTER
Hub staff attempted to follow warm transfer process and was unsuccessful     Caller: Jacob Miller    Relationship to patient: Self    Best call back number:     Patient is needing: PATIENT STATES THAT HE IS RETURNING A CALL TO K[facility] ABOUT HIS GLUCOSE MONITOR.  NO ONE ANSWERED AT THE OFFICE WHEN I TRIED TO CONNECT PATIENT.

## 2021-09-13 ENCOUNTER — TELEPHONE (OUTPATIENT)
Dept: FAMILY MEDICINE CLINIC | Facility: CLINIC | Age: 66
End: 2021-09-13

## 2021-09-13 NOTE — TELEPHONE ENCOUNTER
Caller: Sha Connor    Relationship: Self    Best call back number: [phone] (H)    What is the best time to reach you: ANYTIME    Who are you requesting to speak with (clinical staff, provider,  specific staff member): CLINICAL STAFF/ DR GUERRA    Do you know the name of the person who called: SHA CONNOR    What was the call regarding: PATIENT STATED HE HAS BEEN TAKING DICLOFENAC FOR SOME TIME FOR ARTHRITIS AND IT HAS BEEN WORKING BUT HE HAS TO HAVE A PROCEDURE DONE ON HIS EYELIDS AND HE CANNOT TAKE ANY BLOOD THINNERS AND PATIENT WOULD LIKE TO KNOW IF THERE IS ANYTHING BESIDE TYLENOL THAT HE CAN TAKE TO HELP WITH THE ARTHRITIS PAIN, PLEASE ADVISE ASAP    Do you require a callback: YES, ASAP

## 2021-09-14 NOTE — TELEPHONE ENCOUNTER
CALLED PT AND ADVISED HE WOULD NEED APPT.  PT UNDERSTOOD AND ADVISED GOING TO CHECK WITH PHARMACIST 1ST FOR ANSWER AND IF HE NEEDS AN APPT HE WILL CB TO SCHEDULE.

## 2021-09-17 ENCOUNTER — TELEPHONE (OUTPATIENT)
Dept: FAMILY MEDICINE CLINIC | Facility: CLINIC | Age: 66
End: 2021-09-17

## 2021-09-17 NOTE — TELEPHONE ENCOUNTER
Caller: Sha Miller    Relationship: Self    Best call back number:[phone]   What is the best time to reach you: ASAP   Who are you requesting to speak with (clinical staff, provider,  specific staff member): CLINICAL    Do you know the name of the person who called: SHA    What was the call regarding: PATIENT IS NEEDING A CALL BACK TO KNOW IF HE CAN COME OFF OF diclofenac (VOLTAREN) 75 MG EC tablet UNTIL AFTER HIS SURGERY ON 09/22. PLEASE CALL BACK ASAP.     Do you require a callback: YES

## 2021-09-20 NOTE — TELEPHONE ENCOUNTER
Please call pt back and inform him that yes he can come off the diclofenac for surgery. Please do. Thanks  Nga

## 2021-09-24 ENCOUNTER — TELEPHONE (OUTPATIENT)
Dept: FAMILY MEDICINE CLINIC | Facility: CLINIC | Age: 66
End: 2021-09-24

## 2021-09-24 NOTE — TELEPHONE ENCOUNTER
Caller: [facility]LENA DIXON/[facility]     Best call back number: [phone]*    What was the call regarding: ELENA STATES THAT THEY FAXED DR. GUERRA A MEDICATION CLEARANCE REQUEST FOR diclofenac (VOLTAREN) 75 MG EC tablet. THE PATIENT IS HAVING A PROCEDURE ON 10/5/21, AND WILL NEED TO BE OFF THE MEDICATION 14 DAYS PRIOR TO THE PROCEDURE. ELENA STATES THAT THE PATIENT HAS BEEN OFF THE MEDICATION, BUT THEY STILL NEED THE CLEARANCE FORM SIGNED BY DR. GUERRA AND FAXED BACK TO THEIR OFFICE ASAP.    FAX#: [phone]    Do you require a callback: ONLY IF THERE ARE QUESTIONS

## 2021-09-30 ENCOUNTER — LAB REQUISITION (OUTPATIENT)
Dept: LAB | Facility: HOSPITAL | Age: 66
End: 2021-09-30

## 2021-09-30 DIAGNOSIS — Z00.00 ENCOUNTER FOR GENERAL ADULT MEDICAL EXAMINATION WITHOUT ABNORMAL FINDINGS: ICD-10-CM

## 2021-09-30 LAB — SARS-COV-2 ORF1AB RESP QL NAA+PROBE: NOT DETECTED

## 2021-09-30 PROCEDURE — U0004 COV-19 TEST NON-CDC HGH THRU: HCPCS | Performed by: OPHTHALMOLOGY

## 2021-09-30 PROCEDURE — U0005 INFEC AGEN DETEC AMPLI PROBE: HCPCS | Performed by: OPHTHALMOLOGY

## 2021-10-01 ENCOUNTER — HOSPITAL ENCOUNTER (OUTPATIENT)
Dept: GENERAL RADIOLOGY | Facility: HOSPITAL | Age: 66
Discharge: HOME | End: 2021-10-01
Admitting: NURSE PRACTITIONER

## 2021-10-01 ENCOUNTER — OFFICE VISIT (OUTPATIENT)
Dept: FAMILY MEDICINE CLINIC | Facility: CLINIC | Age: 66
End: 2021-10-01

## 2021-10-01 VITALS
DIASTOLIC BLOOD PRESSURE: 78 MMHG | SYSTOLIC BLOOD PRESSURE: 140 MMHG | WEIGHT: 220.8 LBS | RESPIRATION RATE: 18 BRPM | OXYGEN SATURATION: 98 % | HEART RATE: 67 BPM | BODY MASS INDEX: 29.91 KG/M2 | TEMPERATURE: 97.8 F | HEIGHT: 72 IN

## 2021-10-01 DIAGNOSIS — Z01.818 PREOP EXAMINATION: Primary | ICD-10-CM

## 2021-10-01 DIAGNOSIS — N32.89 OTHER SPECIFIED DISORDERS OF BLADDER: ICD-10-CM

## 2021-10-01 PROCEDURE — 71046 X-RAY EXAM CHEST 2 VIEWS: CPT

## 2021-10-01 PROCEDURE — 99213 OFFICE O/P EST LOW 20 MIN: CPT | Performed by: NURSE PRACTITIONER

## 2021-10-01 PROCEDURE — 93000 ELECTROCARDIOGRAM COMPLETE: CPT | Performed by: NURSE PRACTITIONER

## 2021-10-01 NOTE — PROGRESS NOTES
"Subjective     Jacob Miller is a 66 y.o.. male.     Pt here today for preop exam for juan. Upper eyelid blepharoplasty which he is having done at [facility].       The following portions of the patient's history were reviewed and updated as appropriate: allergies, current medications, past family history, past medical history, past social history, past surgical history and problem list.    Past Medical History:   Diagnosis Date   • Hyperlipidemia    • Hypertension    • Kidney stones    • PAC (premature atrial contraction)    • Palpitations    • Sleep apnea     USES C-PAP MACHINE       Past Surgical History:   Procedure Laterality Date   • HERNIA REPAIR     • ROTATOR CUFF REPAIR         Review of Systems   Constitutional: Negative.    Respiratory: Negative.    Cardiovascular: Negative.        No Known Allergies    Objective     Vitals:    10/01/21 1526   BP: 140/78   BP Location: Left arm   Patient Position: Sitting   Pulse: 67   Resp: 18   Temp: 97.8 °F (36.6 °C)   TempSrc: Oral   SpO2: 98%   Weight: 100 kg (220 lb 12.8 oz)   Height: 182.9 cm (72.01\")     Body mass index is 29.94 kg/m².    Physical Exam  Vitals reviewed.   HENT:      Head: Normocephalic.      Right Ear: Tympanic membrane normal. No middle ear effusion. Tympanic membrane is not erythematous.      Left Ear: Tympanic membrane normal.  No middle ear effusion. Tympanic membrane is not erythematous.      Nose: Nose normal.      Mouth/Throat:      Mouth: Mucous membranes are moist.      Pharynx: Oropharynx is clear. No oropharyngeal exudate or posterior oropharyngeal erythema.   Eyes:      Pupils: Pupils are equal, round, and reactive to light.   Cardiovascular:      Rate and Rhythm: Normal rate and regular rhythm.      Pulses: Normal pulses.   Pulmonary:      Effort: Pulmonary effort is normal. No respiratory distress.      Breath sounds: Normal breath sounds. No stridor. No wheezing, rhonchi or rales.   Abdominal:      General: Bowel sounds are " normal. There is no distension.      Palpations: Abdomen is soft. There is no hepatomegaly or splenomegaly.      Tenderness: There is no abdominal tenderness. There is no guarding or rebound. Negative signs include McBurney's sign.   Musculoskeletal:         General: Normal range of motion.   Lymphadenopathy:      Cervical: No cervical adenopathy.   Skin:     General: Skin is warm and dry.   Neurological:      Mental Status: He is alert and oriented to person, place, and time.   Psychiatric:         Behavior: Behavior normal.           Current Outpatient Medications:   •  amLODIPine (NORVASC) 10 MG tablet, Take 1 tablet by mouth Daily., Disp: 90 tablet, Rfl: 1  •  diclofenac (VOLTAREN) 75 MG EC tablet, TAKE ONE TABLET BY MOUTH DAILY, Disp: 30 tablet, Rfl: 0  •  lisinopril-hydrochlorothiazide (PRINZIDE,ZESTORETIC) 20-12.5 MG per tablet, Take 1 tablet by mouth Daily., Disp: 30 tablet, Rfl: 11  •  metoprolol succinate XL (TOPROL-XL) 50 MG 24 hr tablet, TAKE ONE TABLET BY MOUTH DAILY, Disp: 90 tablet, Rfl: 3  •  mupirocin (Bactroban) 2 % ointment, Apply  topically to the appropriate area as directed 2 (Two) Times a Day., Disp: 22 g, Rfl: 0  •  OneTouch Ultra test strip, USE TO TEST FOR BLOOD SUGAR LEVELS ONCE DAILY AS DIRECTED, Disp: 100 each, Rfl: 11  •  simvastatin (ZOCOR) 40 MG tablet, TAKE ONE TABLET BY MOUTH NIGHTLY, Disp: 30 tablet, Rfl: 5    Recent Results (from the past 2016 hour(s))   COVID-19,APTIMA PANTHER(MARIBEL),[facility] XIOMY, NP/OP SWAB IN UTM/VTM/SALINE TRANSPORT MEDIA,24 HR TAT - Swab, Nasopharynx    Collection Time: 09/30/21 10:32 AM    Specimen: Nasopharynx; Swab   Result Value Ref Range    COVID19 Not Detected Not Detected - Ref. Range     In office EKG 15:10: SR, vent rate 63, TN int 20, QRS 96    Assessment/Plan   Diagnoses and all orders for this visit:    1. Preop examination (Primary)  -     ECG 12 Lead    CBC, CMP, PT/INR, U/A and chest xray ordered and done today; awaiting results        Patient  Instructions   Will call pt once all labs/imaging results back and paperwork filled out. Pt verb. Understanding.       Return for Dr. Benitez as needed/as recommended.

## 2021-10-01 NOTE — PATIENT INSTRUCTIONS
Will call pt once all labs/imaging results back and paperwork filled out. Pt verb. Understanding.

## 2021-10-02 LAB
ALBUMIN SERPL-MCNC: 4.6 G/DL (ref 3.8–4.8)
ALBUMIN/GLOB SERPL: 1.8 {RATIO} (ref 1.2–2.2)
ALP SERPL-CCNC: 76 IU/L (ref 44–121)
ALT SERPL-CCNC: 26 IU/L (ref 0–44)
APPEARANCE UR: CLEAR
AST SERPL-CCNC: 20 IU/L (ref 0–40)
BACTERIA #/AREA URNS HPF: NORMAL /[HPF]
BASOPHILS # BLD AUTO: 0 X10E3/UL (ref 0–0.2)
BASOPHILS NFR BLD AUTO: 1 %
BILIRUB SERPL-MCNC: 0.5 MG/DL (ref 0–1.2)
BILIRUB UR QL STRIP: NEGATIVE
BUN SERPL-MCNC: 13 MG/DL (ref 8–27)
BUN/CREAT SERPL: 15 (ref 10–24)
CALCIUM SERPL-MCNC: 9.4 MG/DL (ref 8.6–10.2)
CASTS URNS QL MICRO: NORMAL /LPF
CHLORIDE SERPL-SCNC: 104 MMOL/L (ref 96–106)
CO2 SERPL-SCNC: 25 MMOL/L (ref 20–29)
COLOR UR: YELLOW
CREAT SERPL-MCNC: 0.84 MG/DL (ref 0.76–1.27)
EOSINOPHIL # BLD AUTO: 0.1 X10E3/UL (ref 0–0.4)
EOSINOPHIL NFR BLD AUTO: 2 %
EPI CELLS #/AREA URNS HPF: NORMAL /HPF (ref 0–10)
ERYTHROCYTE [DISTWIDTH] IN BLOOD BY AUTOMATED COUNT: 14.5 % (ref 11.6–15.4)
GLOBULIN SER CALC-MCNC: 2.6 G/DL (ref 1.5–4.5)
GLUCOSE SERPL-MCNC: 110 MG/DL (ref 65–99)
GLUCOSE UR QL: NEGATIVE
HCT VFR BLD AUTO: 42 % (ref 37.5–51)
HGB BLD-MCNC: 13.9 G/DL (ref 13–17.7)
HGB UR QL STRIP: NEGATIVE
IMM GRANULOCYTES # BLD AUTO: 0 X10E3/UL (ref 0–0.1)
IMM GRANULOCYTES NFR BLD AUTO: 1 %
INR PPP: 0.9 (ref 0.9–1.2)
KETONES UR QL STRIP: NEGATIVE
LEUKOCYTE ESTERASE UR QL STRIP: NEGATIVE
LYMPHOCYTES # BLD AUTO: 1 X10E3/UL (ref 0.7–3.1)
LYMPHOCYTES NFR BLD AUTO: 19 %
MCH RBC QN AUTO: 29.3 PG (ref 26.6–33)
MCHC RBC AUTO-ENTMCNC: 33.1 G/DL (ref 31.5–35.7)
MCV RBC AUTO: 88 FL (ref 79–97)
MICRO URNS: NORMAL
MICRO URNS: NORMAL
MONOCYTES # BLD AUTO: 0.6 X10E3/UL (ref 0.1–0.9)
MONOCYTES NFR BLD AUTO: 11 %
NEUTROPHILS # BLD AUTO: 3.5 X10E3/UL (ref 1.4–7)
NEUTROPHILS NFR BLD AUTO: 66 %
NITRITE UR QL STRIP: NEGATIVE
PH UR STRIP: 6.5 [PH] (ref 5–7.5)
PLATELET # BLD AUTO: 177 X10E3/UL (ref 150–450)
POTASSIUM SERPL-SCNC: 3.8 MMOL/L (ref 3.5–5.2)
PROT SERPL-MCNC: 7.2 G/DL (ref 6–8.5)
PROT UR QL STRIP: NORMAL
PROTHROMBIN TIME: 10.3 SEC (ref 9.1–12)
RBC # BLD AUTO: 4.75 X10E6/UL (ref 4.14–5.8)
RBC #/AREA URNS HPF: NORMAL /HPF (ref 0–2)
SODIUM SERPL-SCNC: 143 MMOL/L (ref 134–144)
SP GR UR: 1.02 (ref 1–1.03)
URINALYSIS REFLEX: NORMAL
UROBILINOGEN UR STRIP-MCNC: 0.2 MG/DL (ref 0.2–1)
WBC # BLD AUTO: 5.2 X10E3/UL (ref 3.4–10.8)
WBC #/AREA URNS HPF: NORMAL /HPF (ref 0–5)

## 2021-10-19 DIAGNOSIS — Z12.5 SPECIAL SCREENING FOR MALIGNANT NEOPLASM OF PROSTATE: ICD-10-CM

## 2021-10-19 DIAGNOSIS — E78.00 HYPERCHOLESTEROLEMIA: Primary | ICD-10-CM

## 2021-10-21 LAB
ALBUMIN SERPL-MCNC: 4.5 G/DL (ref 3.8–4.8)
ALBUMIN/GLOB SERPL: 2 {RATIO} (ref 1.2–2.2)
ALP SERPL-CCNC: 75 IU/L (ref 44–121)
ALT SERPL-CCNC: 34 IU/L (ref 0–44)
APPEARANCE UR: ABNORMAL
AST SERPL-CCNC: 24 IU/L (ref 0–40)
BACTERIA #/AREA URNS HPF: NORMAL /[HPF]
BASOPHILS # BLD AUTO: 0 X10E3/UL (ref 0–0.2)
BASOPHILS NFR BLD AUTO: 0 %
BILIRUB SERPL-MCNC: 0.6 MG/DL (ref 0–1.2)
BILIRUB UR QL STRIP: NEGATIVE
BUN SERPL-MCNC: 20 MG/DL (ref 8–27)
BUN/CREAT SERPL: 22 (ref 10–24)
CALCIUM SERPL-MCNC: 9.1 MG/DL (ref 8.6–10.2)
CASTS URNS QL MICRO: NORMAL /LPF
CHLORIDE SERPL-SCNC: 104 MMOL/L (ref 96–106)
CHOLEST SERPL-MCNC: 106 MG/DL (ref 100–199)
CO2 SERPL-SCNC: 26 MMOL/L (ref 20–29)
COLOR UR: YELLOW
CREAT SERPL-MCNC: 0.9 MG/DL (ref 0.76–1.27)
EOSINOPHIL # BLD AUTO: 0.1 X10E3/UL (ref 0–0.4)
EOSINOPHIL NFR BLD AUTO: 3 %
EPI CELLS #/AREA URNS HPF: NORMAL /HPF (ref 0–10)
ERYTHROCYTE [DISTWIDTH] IN BLOOD BY AUTOMATED COUNT: 14.7 % (ref 11.6–15.4)
GLOBULIN SER CALC-MCNC: 2.3 G/DL (ref 1.5–4.5)
GLUCOSE SERPL-MCNC: 99 MG/DL (ref 65–99)
GLUCOSE UR QL: NEGATIVE
HCT VFR BLD AUTO: 41.7 % (ref 37.5–51)
HDLC SERPL-MCNC: 26 MG/DL
HGB BLD-MCNC: 13.8 G/DL (ref 13–17.7)
HGB UR QL STRIP: NEGATIVE
IMM GRANULOCYTES # BLD AUTO: 0 X10E3/UL (ref 0–0.1)
IMM GRANULOCYTES NFR BLD AUTO: 1 %
KETONES UR QL STRIP: NEGATIVE
LDLC SERPL CALC-MCNC: 55 MG/DL (ref 0–99)
LDLC/HDLC SERPL: 2.1 RATIO (ref 0–3.6)
LEUKOCYTE ESTERASE UR QL STRIP: NEGATIVE
LYMPHOCYTES # BLD AUTO: 1.2 X10E3/UL (ref 0.7–3.1)
LYMPHOCYTES NFR BLD AUTO: 31 %
MCH RBC QN AUTO: 29.2 PG (ref 26.6–33)
MCHC RBC AUTO-ENTMCNC: 33.1 G/DL (ref 31.5–35.7)
MCV RBC AUTO: 88 FL (ref 79–97)
MICRO URNS: ABNORMAL
MICRO URNS: ABNORMAL
MONOCYTES # BLD AUTO: 0.6 X10E3/UL (ref 0.1–0.9)
MONOCYTES NFR BLD AUTO: 16 %
NEUTROPHILS # BLD AUTO: 1.9 X10E3/UL (ref 1.4–7)
NEUTROPHILS NFR BLD AUTO: 49 %
NITRITE UR QL STRIP: NEGATIVE
PH UR STRIP: 7 [PH] (ref 5–7.5)
PLATELET # BLD AUTO: 168 X10E3/UL (ref 150–450)
POTASSIUM SERPL-SCNC: 4.2 MMOL/L (ref 3.5–5.2)
PROT SERPL-MCNC: 6.8 G/DL (ref 6–8.5)
PROT UR QL STRIP: ABNORMAL
PSA SERPL-MCNC: 2.7 NG/ML (ref 0–4)
RBC # BLD AUTO: 4.72 X10E6/UL (ref 4.14–5.8)
RBC #/AREA URNS HPF: NORMAL /HPF (ref 0–2)
SODIUM SERPL-SCNC: 143 MMOL/L (ref 134–144)
SP GR UR: 1.02 (ref 1–1.03)
TRIGL SERPL-MCNC: 142 MG/DL (ref 0–149)
UROBILINOGEN UR STRIP-MCNC: 0.2 MG/DL (ref 0.2–1)
VLDLC SERPL CALC-MCNC: 25 MG/DL (ref 5–40)
WBC # BLD AUTO: 3.8 X10E3/UL (ref 3.4–10.8)
WBC #/AREA URNS HPF: NORMAL /HPF (ref 0–5)

## 2021-10-27 ENCOUNTER — OFFICE VISIT (OUTPATIENT)
Dept: FAMILY MEDICINE CLINIC | Facility: CLINIC | Age: 66
End: 2021-10-27

## 2021-10-27 VITALS
WEIGHT: 222 LBS | RESPIRATION RATE: 18 BRPM | HEART RATE: 58 BPM | DIASTOLIC BLOOD PRESSURE: 80 MMHG | SYSTOLIC BLOOD PRESSURE: 128 MMHG | OXYGEN SATURATION: 99 % | TEMPERATURE: 97.6 F | BODY MASS INDEX: 30.07 KG/M2 | HEIGHT: 72 IN

## 2021-10-27 DIAGNOSIS — G47.33 OBSTRUCTIVE SLEEP APNEA: ICD-10-CM

## 2021-10-27 DIAGNOSIS — E66.3 OVERWEIGHT (BMI 25.0-29.9): ICD-10-CM

## 2021-10-27 DIAGNOSIS — E78.00 HYPERCHOLESTEROLEMIA: ICD-10-CM

## 2021-10-27 DIAGNOSIS — I10 BENIGN ESSENTIAL HYPERTENSION: Primary | ICD-10-CM

## 2021-10-27 DIAGNOSIS — N52.8 OTHER MALE ERECTILE DYSFUNCTION: ICD-10-CM

## 2021-10-27 PROCEDURE — 99214 OFFICE O/P EST MOD 30 MIN: CPT | Performed by: INTERNAL MEDICINE

## 2021-10-27 RX ORDER — SILDENAFIL 100 MG/1
100 TABLET, FILM COATED ORAL DAILY PRN
Qty: 30 TABLET | Refills: 1 | Status: SHIPPED | OUTPATIENT
Start: 2021-10-27

## 2021-10-28 PROBLEM — N52.8 OTHER MALE ERECTILE DYSFUNCTION: Status: ACTIVE | Noted: 2021-10-28

## 2021-10-28 NOTE — PROGRESS NOTES
Subjective   Jacob Miller is a 66 y.o. male. Patient is here today for   Chief Complaint   Patient presents with   • Hypertension     lab follow up           Vitals:    10/27/21 0805   BP: 128/80   Pulse: 58   Resp: 18   Temp: 97.6 °F (36.4 °C)   SpO2: 99%     Body mass index is 30.1 kg/m².      Past Medical History:   Diagnosis Date   • Hyperlipidemia    • Hypertension    • Kidney stones    • PAC (premature atrial contraction)    • Palpitations    • Sleep apnea     USES C-PAP MACHINE      No Known Allergies   Social History     Socioeconomic History   • Marital status:    Tobacco Use   • Smoking status: Never Smoker   • Smokeless tobacco: Never Used   • Tobacco comment: Daily caffeine use   Vaping Use   • Vaping Use: Never used   Substance and Sexual Activity   • Alcohol use: Yes     Comment: occ   • Drug use: No   • Sexual activity: Defer        Current Outpatient Medications:   •  amLODIPine (NORVASC) 10 MG tablet, Take 1 tablet by mouth Daily., Disp: 90 tablet, Rfl: 1  •  diclofenac (VOLTAREN) 75 MG EC tablet, TAKE ONE TABLET BY MOUTH DAILY, Disp: 30 tablet, Rfl: 0  •  lisinopril-hydrochlorothiazide (PRINZIDE,ZESTORETIC) 20-12.5 MG per tablet, Take 1 tablet by mouth Daily., Disp: 30 tablet, Rfl: 11  •  metoprolol succinate XL (TOPROL-XL) 50 MG 24 hr tablet, TAKE ONE TABLET BY MOUTH DAILY, Disp: 90 tablet, Rfl: 3  •  mupirocin (Bactroban) 2 % ointment, Apply  topically to the appropriate area as directed 2 (Two) Times a Day., Disp: 22 g, Rfl: 0  •  OneTouch Ultra test strip, USE TO TEST FOR BLOOD SUGAR LEVELS ONCE DAILY AS DIRECTED, Disp: 100 each, Rfl: 11  •  simvastatin (ZOCOR) 40 MG tablet, TAKE ONE TABLET BY MOUTH NIGHTLY, Disp: 30 tablet, Rfl: 5  •  sildenafil (Viagra) 100 MG tablet, Take 1 tablet by mouth Daily As Needed for Erectile Dysfunction., Disp: 30 tablet, Rfl: 1     Objective     He is here to follow-up on labs done last week.    He has no complaints.    He did request a scription for  erectile dysfunction.    He has obstructive sleep apnea and is compliant with use of CPAP.    He has gotten both Covid vaccines and the booster.  He is going to be getting a flu vaccine later this week.       Review of Systems   Constitutional: Negative.    HENT: Negative.    Respiratory: Negative.    Cardiovascular: Negative.    Genitourinary:        Erectile dysfunction.   Musculoskeletal: Negative.    Psychiatric/Behavioral: Negative.        Physical Exam  Vitals and nursing note reviewed.   Constitutional:       Appearance: Normal appearance.      Comments: Pleasant, neatly groomed, in no distress.   Cardiovascular:      Rate and Rhythm: Regular rhythm.      Heart sounds: Normal heart sounds. No murmur heard.  No gallop.    Pulmonary:      Effort: No respiratory distress.      Breath sounds: Normal breath sounds. No wheezing or rales.   Neurological:      Mental Status: He is alert.   Psychiatric:         Mood and Affect: Mood normal.         Behavior: Behavior normal.         Thought Content: Thought content normal.           Problems Addressed this Visit        Cardiac and Vasculature    Benign essential hypertension - Primary    Hypercholesterolemia       Endocrine and Metabolic    Overweight (BMI 25.0-29.9)       Genitourinary and Reproductive     Other male erectile dysfunction       Sleep    Obstructive sleep apnea      Diagnoses       Codes Comments    Benign essential hypertension    -  Primary ICD-10-CM: I10  ICD-9-CM: 401.1     Hypercholesterolemia     ICD-10-CM: E78.00  ICD-9-CM: 272.0     Overweight (BMI 25.0-29.9)     ICD-10-CM: E66.3  ICD-9-CM: 278.02     Obstructive sleep apnea     ICD-10-CM: G47.33  ICD-9-CM: 327.23     Other male erectile dysfunction     ICD-10-CM: N52.8  ICD-9-CM: 607.84             PLAN  His hypertension is well controlled.    He and I reviewed his labs.  His hypercholesterolemia is very well controlled.    He is overweight/obese with a BMI of 30.  Of asked him to do his best  to lose weight via regular aerobic activity and decrease caloric intake.    He has obstructive sleep apnea and is compliant with use of CPAP.    He has erectile dysfunction.  I sent out a prescription for Benefiel for him.  He will let me know if his symptoms are an adequately treated with this medication.    He should follow-up in 4 to 6 months for an annual wellness visit.  No follow-ups on file.

## 2021-10-30 ENCOUNTER — IMMUNIZATION (OUTPATIENT)
Dept: VACCINE CLINIC | Facility: HOSPITAL | Age: 66
End: 2021-10-30

## 2021-10-30 PROCEDURE — 0004A ADM SARSCOV2 30MCG/0.3ML BOOSTER: CPT | Performed by: INTERNAL MEDICINE

## 2021-10-30 PROCEDURE — 91300 HC SARSCOV02 VAC 30MCG/0.3ML IM: CPT | Performed by: INTERNAL MEDICINE

## 2021-11-17 RX ORDER — DICLOFENAC SODIUM 75 MG/1
TABLET, DELAYED RELEASE ORAL
Qty: 30 TABLET | Refills: 0 | Status: SHIPPED | OUTPATIENT
Start: 2021-11-17 | End: 2021-12-28

## 2021-12-28 RX ORDER — DICLOFENAC SODIUM 75 MG/1
TABLET, DELAYED RELEASE ORAL
Qty: 30 TABLET | Refills: 0 | Status: SHIPPED | OUTPATIENT
Start: 2021-12-28 | End: 2022-02-10

## 2022-01-20 ENCOUNTER — LAB REQUISITION (OUTPATIENT)
Dept: LAB | Facility: HOSPITAL | Age: 67
End: 2022-01-20

## 2022-01-20 DIAGNOSIS — Z00.00 ENCOUNTER FOR GENERAL ADULT MEDICAL EXAMINATION WITHOUT ABNORMAL FINDINGS: ICD-10-CM

## 2022-01-20 LAB — SARS-COV-2 ORF1AB RESP QL NAA+PROBE: NOT DETECTED

## 2022-01-20 PROCEDURE — U0004 COV-19 TEST NON-CDC HGH THRU: HCPCS | Performed by: INTERNAL MEDICINE

## 2022-01-20 PROCEDURE — U0005 INFEC AGEN DETEC AMPLI PROBE: HCPCS | Performed by: INTERNAL MEDICINE

## 2022-01-24 ENCOUNTER — AMBULATORY SURGICAL CENTER (OUTPATIENT)
Dept: URBAN - METROPOLITAN AREA SURGERY 20 | Facility: SURGERY | Age: 67
End: 2022-01-24
Payer: MEDICARE

## 2022-01-24 DIAGNOSIS — Z86.010 PERSONAL HISTORY OF COLONIC POLYPS: ICD-10-CM

## 2022-01-24 DIAGNOSIS — K57.30 DIVERTICULOSIS OF LARGE INTESTINE WITHOUT PERFORATION OR ABS: ICD-10-CM

## 2022-01-24 PROCEDURE — G0105 COLORECTAL SCRN; HI RISK IND: HCPCS | Performed by: INTERNAL MEDICINE

## 2022-02-10 RX ORDER — DICLOFENAC SODIUM 75 MG/1
TABLET, DELAYED RELEASE ORAL
Qty: 30 TABLET | Refills: 0 | Status: SHIPPED | OUTPATIENT
Start: 2022-02-10 | End: 2022-04-05

## 2022-03-09 RX ORDER — AMLODIPINE BESYLATE 10 MG/1
TABLET ORAL
Qty: 90 TABLET | Refills: 1 | Status: SHIPPED | OUTPATIENT
Start: 2022-03-09 | End: 2022-08-10

## 2022-03-24 RX ORDER — SIMVASTATIN 40 MG
TABLET ORAL
Qty: 30 TABLET | Refills: 5 | Status: SHIPPED | OUTPATIENT
Start: 2022-03-24

## 2022-03-24 RX ORDER — METOPROLOL SUCCINATE 50 MG/1
TABLET, EXTENDED RELEASE ORAL
Qty: 90 TABLET | Refills: 3 | Status: SHIPPED | OUTPATIENT
Start: 2022-03-24

## 2022-04-05 RX ORDER — DICLOFENAC SODIUM 75 MG/1
TABLET, DELAYED RELEASE ORAL
Qty: 30 TABLET | Refills: 0 | Status: SHIPPED | OUTPATIENT
Start: 2022-04-05 | End: 2022-05-13

## 2022-04-21 DIAGNOSIS — R73.9 HYPERGLYCEMIA: ICD-10-CM

## 2022-04-21 DIAGNOSIS — E78.00 HYPERCHOLESTEROLEMIA: Primary | ICD-10-CM

## 2022-04-23 LAB
ALBUMIN SERPL-MCNC: 4.7 G/DL (ref 3.8–4.8)
ALBUMIN/GLOB SERPL: 2.1 {RATIO} (ref 1.2–2.2)
ALP SERPL-CCNC: 87 IU/L (ref 44–121)
ALT SERPL-CCNC: 35 IU/L (ref 0–44)
APPEARANCE UR: CLEAR
AST SERPL-CCNC: 23 IU/L (ref 0–40)
BACTERIA #/AREA URNS HPF: NORMAL /[HPF]
BASOPHILS # BLD AUTO: 0 X10E3/UL (ref 0–0.2)
BASOPHILS NFR BLD AUTO: 1 %
BILIRUB SERPL-MCNC: 0.5 MG/DL (ref 0–1.2)
BILIRUB UR QL STRIP: NEGATIVE
BUN SERPL-MCNC: 14 MG/DL (ref 8–27)
BUN/CREAT SERPL: 16 (ref 10–24)
CALCIUM SERPL-MCNC: 9.2 MG/DL (ref 8.6–10.2)
CASTS URNS QL MICRO: NORMAL /LPF
CHLORIDE SERPL-SCNC: 101 MMOL/L (ref 96–106)
CHOLEST SERPL-MCNC: 121 MG/DL (ref 100–199)
CO2 SERPL-SCNC: 25 MMOL/L (ref 20–29)
COLOR UR: YELLOW
CREAT SERPL-MCNC: 0.88 MG/DL (ref 0.76–1.27)
EGFRCR SERPLBLD CKD-EPI 2021: 94 ML/MIN/1.73
EOSINOPHIL # BLD AUTO: 0.1 X10E3/UL (ref 0–0.4)
EOSINOPHIL NFR BLD AUTO: 3 %
EPI CELLS #/AREA URNS HPF: NORMAL /HPF (ref 0–10)
ERYTHROCYTE [DISTWIDTH] IN BLOOD BY AUTOMATED COUNT: 15 % (ref 11.6–15.4)
GLOBULIN SER CALC-MCNC: 2.2 G/DL (ref 1.5–4.5)
GLUCOSE SERPL-MCNC: 117 MG/DL (ref 65–99)
GLUCOSE UR QL STRIP: NEGATIVE
HBA1C MFR BLD: 5.9 % (ref 4.8–5.6)
HCT VFR BLD AUTO: 43.3 % (ref 37.5–51)
HDLC SERPL-MCNC: 28 MG/DL
HGB BLD-MCNC: 14.2 G/DL (ref 13–17.7)
HGB UR QL STRIP: NEGATIVE
IMM GRANULOCYTES # BLD AUTO: 0 X10E3/UL (ref 0–0.1)
IMM GRANULOCYTES NFR BLD AUTO: 1 %
KETONES UR QL STRIP: NEGATIVE
LDLC SERPL CALC-MCNC: 70 MG/DL (ref 0–99)
LDLC/HDLC SERPL: 2.5 RATIO (ref 0–3.6)
LEUKOCYTE ESTERASE UR QL STRIP: NEGATIVE
LYMPHOCYTES # BLD AUTO: 1.1 X10E3/UL (ref 0.7–3.1)
LYMPHOCYTES NFR BLD AUTO: 26 %
MCH RBC QN AUTO: 27.7 PG (ref 26.6–33)
MCHC RBC AUTO-ENTMCNC: 32.8 G/DL (ref 31.5–35.7)
MCV RBC AUTO: 85 FL (ref 79–97)
MICRO URNS: NORMAL
MICRO URNS: NORMAL
MONOCYTES # BLD AUTO: 0.6 X10E3/UL (ref 0.1–0.9)
MONOCYTES NFR BLD AUTO: 16 %
NEUTROPHILS # BLD AUTO: 2.2 X10E3/UL (ref 1.4–7)
NEUTROPHILS NFR BLD AUTO: 53 %
NITRITE UR QL STRIP: NEGATIVE
PH UR STRIP: 7 [PH] (ref 5–7.5)
PLATELET # BLD AUTO: 171 X10E3/UL (ref 150–450)
POTASSIUM SERPL-SCNC: 3.8 MMOL/L (ref 3.5–5.2)
PROT SERPL-MCNC: 6.9 G/DL (ref 6–8.5)
PROT UR QL STRIP: NORMAL
RBC # BLD AUTO: 5.12 X10E6/UL (ref 4.14–5.8)
RBC #/AREA URNS HPF: NORMAL /HPF (ref 0–2)
SODIUM SERPL-SCNC: 140 MMOL/L (ref 134–144)
SP GR UR STRIP: 1.02 (ref 1–1.03)
TRIGL SERPL-MCNC: 130 MG/DL (ref 0–149)
UROBILINOGEN UR STRIP-MCNC: 0.2 MG/DL (ref 0.2–1)
VLDLC SERPL CALC-MCNC: 23 MG/DL (ref 5–40)
WBC # BLD AUTO: 4.1 X10E3/UL (ref 3.4–10.8)
WBC #/AREA URNS HPF: NORMAL /HPF (ref 0–5)

## 2022-05-13 RX ORDER — DICLOFENAC SODIUM 75 MG/1
TABLET, DELAYED RELEASE ORAL
Qty: 30 TABLET | Refills: 0 | Status: SHIPPED | OUTPATIENT
Start: 2022-05-13 | End: 2022-06-17

## 2022-05-21 ENCOUNTER — IMMUNIZATION (OUTPATIENT)
Dept: VACCINE CLINIC | Facility: HOSPITAL | Age: 67
End: 2022-05-21

## 2022-05-21 DIAGNOSIS — Z23 NEED FOR VACCINATION: Primary | ICD-10-CM

## 2022-05-21 PROCEDURE — 91305 HC SARSCOV2 VAC 30 MCG TRS-SUCR PFIZER: CPT | Performed by: INTERNAL MEDICINE

## 2022-05-21 PROCEDURE — 0054A HC ADM SARSCV2 30MCG TRS-SUCR BOOSTER: CPT | Performed by: INTERNAL MEDICINE

## 2022-05-24 ENCOUNTER — OFFICE VISIT (OUTPATIENT)
Dept: FAMILY MEDICINE CLINIC | Facility: CLINIC | Age: 67
End: 2022-05-24

## 2022-05-24 VITALS
DIASTOLIC BLOOD PRESSURE: 80 MMHG | BODY MASS INDEX: 30.4 KG/M2 | HEART RATE: 55 BPM | OXYGEN SATURATION: 100 % | HEIGHT: 72 IN | SYSTOLIC BLOOD PRESSURE: 130 MMHG | RESPIRATION RATE: 18 BRPM | TEMPERATURE: 96.9 F | WEIGHT: 224.4 LBS

## 2022-05-24 DIAGNOSIS — E78.00 HYPERCHOLESTEROLEMIA: ICD-10-CM

## 2022-05-24 DIAGNOSIS — Z23 ENCOUNTER FOR VACCINATION: Primary | ICD-10-CM

## 2022-05-24 DIAGNOSIS — R53.83 FATIGUE, UNSPECIFIED TYPE: ICD-10-CM

## 2022-05-24 DIAGNOSIS — M25.471 SWELLING OF BOTH ANKLES: ICD-10-CM

## 2022-05-24 DIAGNOSIS — E66.3 OVERWEIGHT (BMI 25.0-29.9): ICD-10-CM

## 2022-05-24 DIAGNOSIS — M25.472 SWELLING OF BOTH ANKLES: ICD-10-CM

## 2022-05-24 DIAGNOSIS — G47.33 OBSTRUCTIVE SLEEP APNEA: ICD-10-CM

## 2022-05-24 DIAGNOSIS — R73.9 HYPERGLYCEMIA: ICD-10-CM

## 2022-05-24 DIAGNOSIS — Z00.00 ENCOUNTER FOR SUBSEQUENT ANNUAL WELLNESS VISIT IN MEDICARE PATIENT: ICD-10-CM

## 2022-05-24 DIAGNOSIS — I10 BENIGN ESSENTIAL HYPERTENSION: ICD-10-CM

## 2022-05-24 PROCEDURE — G0439 PPPS, SUBSEQ VISIT: HCPCS | Performed by: INTERNAL MEDICINE

## 2022-05-24 PROCEDURE — 1159F MED LIST DOCD IN RCRD: CPT | Performed by: INTERNAL MEDICINE

## 2022-05-24 PROCEDURE — 1170F FXNL STATUS ASSESSED: CPT | Performed by: INTERNAL MEDICINE

## 2022-05-24 PROCEDURE — 93000 ELECTROCARDIOGRAM COMPLETE: CPT | Performed by: INTERNAL MEDICINE

## 2022-05-26 LAB
TESTOST FREE SERPL-MCNC: 12 PG/ML (ref 6.6–18.1)
TESTOST SERPL-MCNC: 535 NG/DL (ref 264–916)
TSH SERPL DL<=0.005 MIU/L-ACNC: 1.53 UIU/ML (ref 0.45–4.5)
VIT B12 SERPL-MCNC: 861 PG/ML (ref 232–1245)

## 2022-06-17 RX ORDER — DICLOFENAC SODIUM 75 MG/1
TABLET, DELAYED RELEASE ORAL
Qty: 30 TABLET | Refills: 0 | Status: SHIPPED | OUTPATIENT
Start: 2022-06-17 | End: 2022-07-15

## 2022-07-08 ENCOUNTER — TELEPHONE (OUTPATIENT)
Dept: FAMILY MEDICINE CLINIC | Facility: CLINIC | Age: 67
End: 2022-07-08

## 2022-07-11 ENCOUNTER — OFFICE VISIT (OUTPATIENT)
Dept: FAMILY MEDICINE CLINIC | Facility: CLINIC | Age: 67
End: 2022-07-11

## 2022-07-11 DIAGNOSIS — R05.9 COUGH: ICD-10-CM

## 2022-07-11 DIAGNOSIS — U07.1 COVID-19 VIRUS INFECTION: Primary | ICD-10-CM

## 2022-07-11 PROCEDURE — 99442 PR PHYS/QHP TELEPHONE EVALUATION 11-20 MIN: CPT | Performed by: NURSE PRACTITIONER

## 2022-07-11 RX ORDER — PREDNISONE 20 MG/1
20 TABLET ORAL 2 TIMES DAILY
Qty: 8 TABLET | Refills: 0 | Status: SHIPPED | OUTPATIENT
Start: 2022-07-11 | End: 2022-07-15

## 2022-07-11 RX ORDER — AZITHROMYCIN 250 MG/1
TABLET, FILM COATED ORAL
Qty: 6 TABLET | Refills: 0 | Status: SHIPPED | OUTPATIENT
Start: 2022-07-11 | End: 2022-10-12

## 2022-07-11 NOTE — PATIENT INSTRUCTIONS
Drink plenty of fluids-water preferably, eat a heart healthy diet, get plenty of sleep and do warm salt water gargles twice a day until feeling better; pt informed to stay in quarantine until 7/15/22. Pt verb. Understanding.

## 2022-07-11 NOTE — PROGRESS NOTES
Subjective     Jacob Miller is a 67 y.o.. male.     You have chosen to receive care through a telephone visit. Do you consent to use a telephone visit for your medical care today? yes    Home covid test positive 7/8/22; symptoms started 7/6/22    Sore Throat   This is a new problem. Episode onset: day 6. The problem has been gradually improving. There has been no fever. Associated symptoms include congestion, coughing and headaches. Pertinent negatives include no diarrhea, ear pain, shortness of breath or vomiting.       The following portions of the patient's history were reviewed and updated as appropriate: allergies, current medications, past family history, past medical history, past social history, past surgical history and problem list.    Past Medical History:   Diagnosis Date   • Hyperlipidemia    • Hypertension    • Kidney stones    • PAC (premature atrial contraction)    • Palpitations    • Sleep apnea     USES C-PAP MACHINE       Past Surgical History:   Procedure Laterality Date   • BELPHAROPTOSIS REPAIR Bilateral    • HERNIA REPAIR     • ROTATOR CUFF REPAIR         Review of Systems   Constitutional: Negative for fever.   HENT: Positive for congestion, postnasal drip, rhinorrhea, sneezing and sore throat. Negative for ear pain.    Respiratory: Positive for cough. Negative for shortness of breath.    Gastrointestinal: Negative for diarrhea, nausea and vomiting.   Genitourinary: Negative.    Musculoskeletal: Positive for arthralgias (friday and saturday but resolved).   Neurological: Positive for headaches.       No Known Allergies    Objective     There were no vitals filed for this visit.  There is no height or weight on file to calculate BMI.       Current Outpatient Medications:   •  amLODIPine (NORVASC) 10 MG tablet, TAKE ONE TABLET BY MOUTH DAILY, Disp: 90 tablet, Rfl: 1  •  azithromycin (Zithromax) 250 MG tablet, Take 2 tablets the first day, then 1 tablet daily for 4 days., Disp: 6 tablet,  Rfl: 0  •  diclofenac (VOLTAREN) 75 MG EC tablet, TAKE ONE TABLET BY MOUTH DAILY, Disp: 30 tablet, Rfl: 0  •  lisinopril-hydrochlorothiazide (PRINZIDE,ZESTORETIC) 20-12.5 MG per tablet, Take 1 tablet by mouth Daily., Disp: 30 tablet, Rfl: 11  •  metoprolol succinate XL (TOPROL-XL) 50 MG 24 hr tablet, TAKE ONE TABLET BY MOUTH DAILY, Disp: 90 tablet, Rfl: 3  •  mupirocin (Bactroban) 2 % ointment, Apply  topically to the appropriate area as directed 2 (Two) Times a Day., Disp: 22 g, Rfl: 0  •  OneTouch Ultra test strip, USE TO TEST FOR BLOOD SUGAR LEVELS ONCE DAILY AS DIRECTED, Disp: 100 each, Rfl: 11  •  predniSONE (DELTASONE) 20 MG tablet, Take 1 tablet by mouth 2 (Two) Times a Day for 4 days., Disp: 8 tablet, Rfl: 0  •  sildenafil (Viagra) 100 MG tablet, Take 1 tablet by mouth Daily As Needed for Erectile Dysfunction., Disp: 30 tablet, Rfl: 1  •  simvastatin (ZOCOR) 40 MG tablet, TAKE ONE TABLET BY MOUTH ONCE NIGHTLY, Disp: 30 tablet, Rfl: 5    Time: 19 minutes    Diagnoses and all orders for this visit:    1. COVID-19 virus infection (Primary)  -     azithromycin (Zithromax) 250 MG tablet; Take 2 tablets the first day, then 1 tablet daily for 4 days.  Dispense: 6 tablet; Refill: 0    2. Cough  -     predniSONE (DELTASONE) 20 MG tablet; Take 1 tablet by mouth 2 (Two) Times a Day for 4 days.  Dispense: 8 tablet; Refill: 0        Patient Instructions   Drink plenty of fluids-water preferably, eat a heart healthy diet, get plenty of sleep and do warm salt water gargles twice a day until feeling better; pt informed to stay in quarantine until 7/15/22. Pt verb. Understanding.      Return if symptoms worsen or fail to improve.

## 2022-07-15 RX ORDER — DICLOFENAC SODIUM 75 MG/1
TABLET, DELAYED RELEASE ORAL
Qty: 30 TABLET | Refills: 0 | Status: SHIPPED | OUTPATIENT
Start: 2022-07-15 | End: 2022-08-24

## 2022-07-15 RX ORDER — LISINOPRIL AND HYDROCHLOROTHIAZIDE 20; 12.5 MG/1; MG/1
TABLET ORAL
Qty: 30 TABLET | Refills: 11 | Status: SHIPPED | OUTPATIENT
Start: 2022-07-15 | End: 2022-08-10

## 2022-07-18 ENCOUNTER — TELEPHONE (OUTPATIENT)
Dept: FAMILY MEDICINE CLINIC | Facility: CLINIC | Age: 67
End: 2022-07-18

## 2022-07-18 RX ORDER — DEXTROMETHORPHAN HYDROBROMIDE AND PROMETHAZINE HYDROCHLORIDE 15; 6.25 MG/5ML; MG/5ML
5 SYRUP ORAL 3 TIMES DAILY PRN
Qty: 150 ML | Refills: 0 | Status: SHIPPED | OUTPATIENT
Start: 2022-07-18 | End: 2022-10-12

## 2022-07-18 NOTE — TELEPHONE ENCOUNTER
PATIENT CALLED AND STATES HE HAD COVID LAST WEEK , BUT STILL HAS A LINGERING COUGH. HE WAS TOLD TO CONTACT THE OFFICE IF HE STILL HAS A COUGH.    [facility] [address] [place] [address] [id number] [place] [address] - [phone]  - [phone]   [phone]    CALL BACK NUMBER [phone]

## 2022-07-29 RX ORDER — LANCETS 33 GAUGE
EACH MISCELLANEOUS
COMMUNITY
End: 2022-07-29 | Stop reason: SDUPTHER

## 2022-07-29 RX ORDER — BLOOD PRESSURE TEST KIT
KIT MISCELLANEOUS
COMMUNITY
End: 2022-07-29 | Stop reason: SDUPTHER

## 2022-08-01 ENCOUNTER — TELEPHONE (OUTPATIENT)
Dept: FAMILY MEDICINE CLINIC | Facility: CLINIC | Age: 67
End: 2022-08-01

## 2022-08-01 RX ORDER — LANCETS 33 GAUGE
EACH MISCELLANEOUS
Qty: 100 EACH | Refills: 3 | Status: SHIPPED | OUTPATIENT
Start: 2022-08-01 | End: 2022-08-09 | Stop reason: SDUPTHER

## 2022-08-01 RX ORDER — BLOOD PRESSURE TEST KIT
KIT MISCELLANEOUS
Qty: 100 EACH | Refills: 3 | Status: SHIPPED | OUTPATIENT
Start: 2022-08-01

## 2022-08-01 NOTE — TELEPHONE ENCOUNTER
Caller: Jacob Miller    Relationship: Self    Best call back number:     What is the best time to reach you:     Who are you requesting to speak with (clinical staff, provider,  specific staff member):     Do you know the name of the person who called:     What was the call regarding: PATIENT IS CALLING IN STATING THAT HE IS HAVING A ISSUE WITH ONE OF HIS TOES ON HIS RIGHT FOOT.  HE WANTS TO KNOW IF DR GUERRA WANTS TO SEE HIM OR IF HE NEEDS TO BE SEEN BY PODIATRY.     Do you require a callback: YES

## 2022-08-09 DIAGNOSIS — R73.9 HYPERGLYCEMIA: Primary | ICD-10-CM

## 2022-08-09 RX ORDER — LANCETS 33 GAUGE
EACH MISCELLANEOUS
Qty: 100 EACH | Refills: 3 | Status: SHIPPED | OUTPATIENT
Start: 2022-08-09

## 2022-08-09 NOTE — TELEPHONE ENCOUNTER
Rx Refill Note  Requested Prescriptions     Pending Prescriptions Disp Refills   • OneTouch Delica Lancets 33G misc 100 each 3     Sig: Check glucose 3 times daily      Last office visit with prescribing clinician: 5/24/2022      Next office visit with prescribing clinician: 8/10/2022            Sarah Whiteside MA  08/09/22, 10:24 EDT

## 2022-08-10 ENCOUNTER — OFFICE VISIT (OUTPATIENT)
Dept: FAMILY MEDICINE CLINIC | Facility: CLINIC | Age: 67
End: 2022-08-10

## 2022-08-10 VITALS
HEART RATE: 76 BPM | TEMPERATURE: 98.5 F | SYSTOLIC BLOOD PRESSURE: 124 MMHG | DIASTOLIC BLOOD PRESSURE: 80 MMHG | HEIGHT: 72 IN | WEIGHT: 225 LBS | OXYGEN SATURATION: 98 % | BODY MASS INDEX: 30.48 KG/M2 | RESPIRATION RATE: 18 BRPM

## 2022-08-10 DIAGNOSIS — G47.33 OBSTRUCTIVE SLEEP APNEA: ICD-10-CM

## 2022-08-10 DIAGNOSIS — I10 BENIGN ESSENTIAL HYPERTENSION: Primary | ICD-10-CM

## 2022-08-10 DIAGNOSIS — L03.031 CELLULITIS OF TOE OF RIGHT FOOT: ICD-10-CM

## 2022-08-10 PROCEDURE — 99214 OFFICE O/P EST MOD 30 MIN: CPT | Performed by: INTERNAL MEDICINE

## 2022-08-10 RX ORDER — AMOXICILLIN AND CLAVULANATE POTASSIUM 875; 125 MG/1; MG/1
1 TABLET, FILM COATED ORAL 2 TIMES DAILY
Qty: 14 TABLET | Refills: 0 | Status: SHIPPED | OUTPATIENT
Start: 2022-08-10 | End: 2022-10-12

## 2022-08-10 RX ORDER — LISINOPRIL AND HYDROCHLOROTHIAZIDE 20; 12.5 MG/1; MG/1
TABLET ORAL
Qty: 60 TABLET | Refills: 2 | Status: SHIPPED | OUTPATIENT
Start: 2022-08-10 | End: 2023-01-17

## 2022-08-10 RX ORDER — AMLODIPINE BESYLATE 5 MG/1
10 TABLET ORAL DAILY
Qty: 90 TABLET | Refills: 3 | Status: SHIPPED | OUTPATIENT
Start: 2022-08-10 | End: 2022-09-21 | Stop reason: SDUPTHER

## 2022-08-10 NOTE — PROGRESS NOTES
Subjective   Jacob Miller is a 67 y.o. male. Patient is here today for   Chief Complaint   Patient presents with   • Toe Pain     RIGHT TOE PAIN AND HIGH BLOOD SUGAR    • Hypertension     P          Vitals:    08/10/22 1438   BP: 124/80   Pulse: 76   Resp: 18   Temp: 98.5 °F (36.9 °C)   SpO2: 98%     Body mass index is 30.51 kg/m².      Past Medical History:   Diagnosis Date   • Hyperlipidemia    • Hypertension    • Kidney stones    • PAC (premature atrial contraction)    • Palpitations    • Sleep apnea     USES C-PAP MACHINE      No Known Allergies   Social History     Socioeconomic History   • Marital status:    Tobacco Use   • Smoking status: Never Smoker   • Smokeless tobacco: Never Used   • Tobacco comment: Daily caffeine use   Vaping Use   • Vaping Use: Never used   Substance and Sexual Activity   • Alcohol use: Yes     Comment: occ   • Drug use: No   • Sexual activity: Defer        Current Outpatient Medications:   •  Alcohol Swabs pads, Clean area of injection as needed 3 times daily., Disp: 100 each, Rfl: 3  •  amLODIPine (NORVASC) 5 MG tablet, Take 2 tablets by mouth Daily., Disp: 90 tablet, Rfl: 3  •  azithromycin (Zithromax) 250 MG tablet, Take 2 tablets the first day, then 1 tablet daily for 4 days., Disp: 6 tablet, Rfl: 0  •  diclofenac (VOLTAREN) 75 MG EC tablet, TAKE ONE TABLET BY MOUTH DAILY, Disp: 30 tablet, Rfl: 0  •  metoprolol succinate XL (TOPROL-XL) 50 MG 24 hr tablet, TAKE ONE TABLET BY MOUTH DAILY, Disp: 90 tablet, Rfl: 3  •  mupirocin (Bactroban) 2 % ointment, Apply  topically to the appropriate area as directed 2 (Two) Times a Day., Disp: 22 g, Rfl: 0  •  OneTouch Delica Lancets 33G misc, Check glucose 3 times daily, Disp: 100 each, Rfl: 3  •  OneTouch Ultra test strip, USE TO TEST FOR BLOOD SUGAR LEVELS ONCE DAILY AS DIRECTED, Disp: 100 each, Rfl: 11  •  promethazine-dextromethorphan (PROMETHAZINE-DM) 6.25-15 MG/5ML syrup, Take 5 mL by mouth 3 (Three) Times a Day As Needed for  Cough., Disp: 150 mL, Rfl: 0  •  sildenafil (Viagra) 100 MG tablet, Take 1 tablet by mouth Daily As Needed for Erectile Dysfunction., Disp: 30 tablet, Rfl: 1  •  simvastatin (ZOCOR) 40 MG tablet, TAKE ONE TABLET BY MOUTH ONCE NIGHTLY, Disp: 30 tablet, Rfl: 5  •  amoxicillin-clavulanate (Augmentin) 875-125 MG per tablet, Take 1 tablet by mouth 2 (Two) Times a Day., Disp: 14 tablet, Rfl: 0  •  lisinopril-hydrochlorothiazide (PRINZIDE,ZESTORETIC) 20-12.5 MG per tablet, Two tablets po q am, Disp: 60 tablet, Rfl: 2     Objective     He noticed that tapering his amlodipine from 10 mg daily to 5 mg daily improved with bilateral lower extremity edema significantly.    The lateral edge of the third toe of the right foot is frequently tender.        Toe Pain     Hypertension         Review of Systems   Constitutional: Negative.    HENT: Negative.    Respiratory: Negative.    Cardiovascular: Negative.    Musculoskeletal: Negative.    Psychiatric/Behavioral: Negative.        Physical Exam  Vitals and nursing note reviewed.   Constitutional:       Appearance: Normal appearance. He is obese.      Comments: Pleasant, neatly groomed, in no distress.   Cardiovascular:      Rate and Rhythm: Regular rhythm.      Heart sounds: Normal heart sounds. No murmur heard.    No gallop.   Pulmonary:      Effort: No respiratory distress.      Breath sounds: Normal breath sounds. No wheezing or rales.   Skin:     Comments: Though lateral portion of the distal third toe on the right side is a little bit of erythema.  If he there may be some infection here.   Neurological:      Mental Status: He is alert and oriented to person, place, and time.   Psychiatric:         Mood and Affect: Mood normal.         Behavior: Behavior normal.         Thought Content: Thought content normal.                 PLAN  I sent an order out for Augmentin 875 1 p.o. twice daily #14 no refills to treat a cellulitis of his small toe of his right foot.    I sent out a  prescription for 5 mg of amlodipine to take the place of the 10 mg tablets that he was cutting in half.  He will continue to take 5 mg daily.    I wrote a prescription for lisinopril/hydrochlorothiazide 20/12-1/2.  I asked him to take 2 of these in the morning routinely.    I like to have him back in about 2 months.  Labs fasting prior to that visit should include: Lipid profile, comprehensive metabolic panel, CBC, urinalysis.  Also, I will check a hemoglobin A1c.  No follow-ups on file.

## 2022-09-21 RX ORDER — AMLODIPINE BESYLATE 5 MG/1
10 TABLET ORAL DAILY
Qty: 90 TABLET | Refills: 3 | Status: SHIPPED | OUTPATIENT
Start: 2022-09-21 | End: 2022-10-12 | Stop reason: SDUPTHER

## 2022-09-22 DIAGNOSIS — I10 BENIGN ESSENTIAL HYPERTENSION: ICD-10-CM

## 2022-09-22 DIAGNOSIS — R73.9 HYPERGLYCEMIA: ICD-10-CM

## 2022-09-22 DIAGNOSIS — R82.90 ABNORMAL FINDING IN URINE: ICD-10-CM

## 2022-09-22 DIAGNOSIS — E78.00 HYPERCHOLESTEROLEMIA: Primary | ICD-10-CM

## 2022-09-26 RX ORDER — DICLOFENAC SODIUM 75 MG/1
TABLET, DELAYED RELEASE ORAL
Qty: 30 TABLET | Refills: 0 | Status: SHIPPED | OUTPATIENT
Start: 2022-09-26 | End: 2022-10-24

## 2022-10-11 LAB
ALBUMIN SERPL-MCNC: 4.7 G/DL (ref 3.8–4.8)
ALBUMIN/GLOB SERPL: 2.2 {RATIO} (ref 1.2–2.2)
ALP SERPL-CCNC: 72 IU/L (ref 44–121)
ALT SERPL-CCNC: 35 IU/L (ref 0–44)
APPEARANCE UR: CLEAR
AST SERPL-CCNC: 28 IU/L (ref 0–40)
BACTERIA #/AREA URNS HPF: NORMAL /[HPF]
BASOPHILS # BLD AUTO: 0 X10E3/UL (ref 0–0.2)
BASOPHILS NFR BLD AUTO: 0 %
BILIRUB SERPL-MCNC: 0.6 MG/DL (ref 0–1.2)
BILIRUB UR QL STRIP: NEGATIVE
BUN SERPL-MCNC: 16 MG/DL (ref 8–27)
BUN/CREAT SERPL: 18 (ref 10–24)
CALCIUM SERPL-MCNC: 9.2 MG/DL (ref 8.6–10.2)
CASTS URNS QL MICRO: NORMAL /LPF
CHLORIDE SERPL-SCNC: 101 MMOL/L (ref 96–106)
CHOLEST SERPL-MCNC: 120 MG/DL (ref 100–199)
CO2 SERPL-SCNC: 28 MMOL/L (ref 20–29)
COLOR UR: YELLOW
CREAT SERPL-MCNC: 0.87 MG/DL (ref 0.76–1.27)
EGFRCR SERPLBLD CKD-EPI 2021: 93 ML/MIN/1.73
EOSINOPHIL # BLD AUTO: 0.2 X10E3/UL (ref 0–0.4)
EOSINOPHIL NFR BLD AUTO: 3 %
EPI CELLS #/AREA URNS HPF: NORMAL /HPF (ref 0–10)
ERYTHROCYTE [DISTWIDTH] IN BLOOD BY AUTOMATED COUNT: 15.1 % (ref 11.6–15.4)
GLOBULIN SER CALC-MCNC: 2.1 G/DL (ref 1.5–4.5)
GLUCOSE SERPL-MCNC: 94 MG/DL (ref 70–99)
GLUCOSE UR QL STRIP: NEGATIVE
HBA1C MFR BLD: 6.1 % (ref 4.8–5.6)
HCT VFR BLD AUTO: 41.3 % (ref 37.5–51)
HDLC SERPL-MCNC: 25 MG/DL
HGB BLD-MCNC: 13.8 G/DL (ref 13–17.7)
HGB UR QL STRIP: NEGATIVE
IMM GRANULOCYTES # BLD AUTO: 0 X10E3/UL (ref 0–0.1)
IMM GRANULOCYTES NFR BLD AUTO: 1 %
KETONES UR QL STRIP: NEGATIVE
LDLC SERPL CALC-MCNC: 68 MG/DL (ref 0–99)
LEUKOCYTE ESTERASE UR QL STRIP: NEGATIVE
LYMPHOCYTES # BLD AUTO: 1.3 X10E3/UL (ref 0.7–3.1)
LYMPHOCYTES NFR BLD AUTO: 29 %
MCH RBC QN AUTO: 28.7 PG (ref 26.6–33)
MCHC RBC AUTO-ENTMCNC: 33.4 G/DL (ref 31.5–35.7)
MCV RBC AUTO: 86 FL (ref 79–97)
MICRO URNS: ABNORMAL
MONOCYTES # BLD AUTO: 0.6 X10E3/UL (ref 0.1–0.9)
MONOCYTES NFR BLD AUTO: 13 %
NEUTROPHILS # BLD AUTO: 2.5 X10E3/UL (ref 1.4–7)
NEUTROPHILS NFR BLD AUTO: 54 %
NITRITE UR QL STRIP: NEGATIVE
PH UR STRIP: 6.5 [PH] (ref 5–7.5)
PLATELET # BLD AUTO: 175 X10E3/UL (ref 150–450)
POTASSIUM SERPL-SCNC: 3.6 MMOL/L (ref 3.5–5.2)
PROT SERPL-MCNC: 6.8 G/DL (ref 6–8.5)
PROT UR QL STRIP: ABNORMAL
RBC # BLD AUTO: 4.81 X10E6/UL (ref 4.14–5.8)
RBC #/AREA URNS HPF: NORMAL /HPF (ref 0–2)
SODIUM SERPL-SCNC: 142 MMOL/L (ref 134–144)
SP GR UR STRIP: 1.02 (ref 1–1.03)
TRIGL SERPL-MCNC: 154 MG/DL (ref 0–149)
URINALYSIS REFLEX: ABNORMAL
UROBILINOGEN UR STRIP-MCNC: 1 MG/DL (ref 0.2–1)
VLDLC SERPL CALC-MCNC: 27 MG/DL (ref 5–40)
WBC # BLD AUTO: 4.6 X10E3/UL (ref 3.4–10.8)
WBC #/AREA URNS HPF: NORMAL /HPF (ref 0–5)

## 2022-10-12 ENCOUNTER — OFFICE VISIT (OUTPATIENT)
Dept: FAMILY MEDICINE CLINIC | Facility: CLINIC | Age: 67
End: 2022-10-12

## 2022-10-12 VITALS
DIASTOLIC BLOOD PRESSURE: 68 MMHG | TEMPERATURE: 98.4 F | BODY MASS INDEX: 30.07 KG/M2 | SYSTOLIC BLOOD PRESSURE: 142 MMHG | HEART RATE: 92 BPM | HEIGHT: 72 IN | WEIGHT: 222 LBS | OXYGEN SATURATION: 98 %

## 2022-10-12 DIAGNOSIS — E78.00 HYPERCHOLESTEROLEMIA: ICD-10-CM

## 2022-10-12 DIAGNOSIS — I10 BENIGN ESSENTIAL HYPERTENSION: Primary | ICD-10-CM

## 2022-10-12 DIAGNOSIS — E66.3 OVERWEIGHT (BMI 25.0-29.9): ICD-10-CM

## 2022-10-12 DIAGNOSIS — R73.9 HYPERGLYCEMIA: ICD-10-CM

## 2022-10-12 PROCEDURE — 99214 OFFICE O/P EST MOD 30 MIN: CPT | Performed by: INTERNAL MEDICINE

## 2022-10-12 RX ORDER — AMLODIPINE BESYLATE 5 MG/1
10 TABLET ORAL DAILY
Qty: 90 TABLET | Refills: 3 | Status: SHIPPED | OUTPATIENT
Start: 2022-10-12

## 2022-10-12 NOTE — TELEPHONE ENCOUNTER
Rx Refill Note  Requested Prescriptions     Pending Prescriptions Disp Refills   • amLODIPine (NORVASC) 5 MG tablet 90 tablet 3     Sig: Take 2 tablets by mouth Daily.      Last office visit with prescribing clinician: 8/10/2022      Next office visit with prescribing clinician: 10/12/2022            Sarah Whiteside MA  10/12/22, 11:15 EDT

## 2022-10-12 NOTE — TELEPHONE ENCOUNTER
Rx Refill Note  Requested Prescriptions     Pending Prescriptions Disp Refills   • amLODIPine (NORVASC) 5 MG tablet 90 tablet 3     Sig: Take 2 tablets by mouth Daily.      Last office visit with prescribing clinician: 8/10/2022      Next office visit with prescribing clinician: 10/12/2022            Sarah Whiteside MA  10/12/22, 13:13 EDT

## 2022-10-12 NOTE — PROGRESS NOTES
Subjective   Jacob Miller is a 67 y.o. male. Patient is here today for   Chief Complaint   Patient presents with   • Hyperlipidemia   • Hypertension     F/u to discuss lab results           Vitals:    10/12/22 1520   BP: 142/68   Pulse: 92   Temp: 98.4 °F (36.9 °C)   SpO2: 98%     Body mass index is 30.1 kg/m².      Past Medical History:   Diagnosis Date   • Hyperlipidemia    • Hypertension    • Kidney stones    • PAC (premature atrial contraction)    • Palpitations    • Sleep apnea     USES C-PAP MACHINE      No Known Allergies   Social History     Socioeconomic History   • Marital status:    Tobacco Use   • Smoking status: Never   • Smokeless tobacco: Never   • Tobacco comments:     Daily caffeine use   Vaping Use   • Vaping Use: Never used   Substance and Sexual Activity   • Alcohol use: Yes     Comment: occ   • Drug use: No   • Sexual activity: Defer        Current Outpatient Medications:   •  Alcohol Swabs pads, Clean area of injection as needed 3 times daily., Disp: 100 each, Rfl: 3  •  amLODIPine (NORVASC) 5 MG tablet, Take 2 tablets by mouth Daily., Disp: 90 tablet, Rfl: 3  •  diclofenac (VOLTAREN) 75 MG EC tablet, TAKE ONE TABLET BY MOUTH DAILY, Disp: 30 tablet, Rfl: 0  •  lisinopril-hydrochlorothiazide (PRINZIDE,ZESTORETIC) 20-12.5 MG per tablet, Two tablets po q am, Disp: 60 tablet, Rfl: 2  •  metoprolol succinate XL (TOPROL-XL) 50 MG 24 hr tablet, TAKE ONE TABLET BY MOUTH DAILY, Disp: 90 tablet, Rfl: 3  •  OneTouch Delica Lancets 33G misc, Check glucose 3 times daily, Disp: 100 each, Rfl: 3  •  OneTouch Ultra test strip, USE TO TEST FOR BLOOD SUGAR LEVELS ONCE DAILY AS DIRECTED, Disp: 100 each, Rfl: 11  •  sildenafil (Viagra) 100 MG tablet, Take 1 tablet by mouth Daily As Needed for Erectile Dysfunction., Disp: 30 tablet, Rfl: 1  •  simvastatin (ZOCOR) 40 MG tablet, TAKE ONE TABLET BY MOUTH ONCE NIGHTLY, Disp: 30 tablet, Rfl: 5     Objective     History of Present Illness  He is here today to  discuss labs done last week.    He feels well.       Review of Systems   Constitutional: Negative.    HENT: Negative.    Respiratory: Negative.    Cardiovascular: Negative.    Musculoskeletal: Negative.    Psychiatric/Behavioral: Negative.        Physical Exam  Vitals and nursing note reviewed.   Constitutional:       Appearance: Normal appearance.   Cardiovascular:      Rate and Rhythm: Regular rhythm.      Heart sounds: Normal heart sounds. No murmur heard.    No gallop.   Pulmonary:      Effort: No respiratory distress.      Breath sounds: Normal breath sounds. No wheezing or rales.   Neurological:      Mental Status: He is alert and oriented to person, place, and time.   Psychiatric:         Mood and Affect: Mood normal.         Behavior: Behavior normal.         Thought Content: Thought content normal.           Problems Addressed this Visit        Cardiac and Vasculature    Benign essential hypertension - Primary    Hypercholesterolemia       Endocrine and Metabolic    Hyperglycemia    Overweight (BMI 25.0-29.9)   Diagnoses       Codes Comments    Benign essential hypertension    -  Primary ICD-10-CM: I10  ICD-9-CM: 401.1     Hypercholesterolemia     ICD-10-CM: E78.00  ICD-9-CM: 272.0     Hyperglycemia     ICD-10-CM: R73.9  ICD-9-CM: 790.29     Overweight (BMI 25.0-29.9)     ICD-10-CM: E66.3  ICD-9-CM: 278.02             PLAN  His hypertension is well controlled today.  When I checked his blood pressure in his left arm as he was seated I got 126/76.    His hypercholesterolemia is well controlled on simvastatin 40 mg daily.    He has obstructive sleep apnea and is compliant with use of CPAP.    He is moderately overweight.  His BMI today is 30 and he has hyperglycemia.  I have encouraged him to do his best to lose weight via regular aerobic activity per American Heart Association guidelines of brisk aerobic activity 30 minutes a day 5 days a week and decrease caloric intake.    I asked him to follow-up in about  6 months.  Fasting labs prior to that visit should include: Lipid profile, comprehensive metabolic panel, CBC, urinalysis, hemoglobin A1c and if it has been more than a year since it was checked last, he should get a PSA.  No follow-ups on file.

## 2022-10-24 RX ORDER — DICLOFENAC SODIUM 75 MG/1
TABLET, DELAYED RELEASE ORAL
Qty: 30 TABLET | Refills: 0 | Status: SHIPPED | OUTPATIENT
Start: 2022-10-24 | End: 2022-11-21

## 2022-11-21 RX ORDER — DICLOFENAC SODIUM 75 MG/1
TABLET, DELAYED RELEASE ORAL
Qty: 30 TABLET | Refills: 0 | Status: SHIPPED | OUTPATIENT
Start: 2022-11-21 | End: 2022-12-27

## 2022-12-27 RX ORDER — DICLOFENAC SODIUM 75 MG/1
TABLET, DELAYED RELEASE ORAL
Qty: 30 TABLET | Refills: 0 | Status: SHIPPED | OUTPATIENT
Start: 2022-12-27 | End: 2023-01-27

## 2023-01-17 RX ORDER — LISINOPRIL AND HYDROCHLOROTHIAZIDE 20; 12.5 MG/1; MG/1
TABLET ORAL
Qty: 60 TABLET | Refills: 2 | Status: SHIPPED | OUTPATIENT
Start: 2023-01-17

## 2023-01-27 RX ORDER — DICLOFENAC SODIUM 75 MG/1
TABLET, DELAYED RELEASE ORAL
Qty: 30 TABLET | Refills: 0 | Status: SHIPPED | OUTPATIENT
Start: 2023-01-27 | End: 2023-03-02

## 2023-03-02 RX ORDER — DICLOFENAC SODIUM 75 MG/1
TABLET, DELAYED RELEASE ORAL
Qty: 30 TABLET | Refills: 0 | Status: SHIPPED | OUTPATIENT
Start: 2023-03-02 | End: 2023-04-07

## 2023-04-03 ENCOUNTER — OFFICE VISIT (OUTPATIENT)
Dept: FAMILY MEDICINE CLINIC | Facility: CLINIC | Age: 68
End: 2023-04-03
Payer: MEDICARE

## 2023-04-03 VITALS
WEIGHT: 222 LBS | OXYGEN SATURATION: 97 % | RESPIRATION RATE: 16 BRPM | HEART RATE: 71 BPM | TEMPERATURE: 98.6 F | SYSTOLIC BLOOD PRESSURE: 144 MMHG | HEIGHT: 73 IN | DIASTOLIC BLOOD PRESSURE: 75 MMHG | BODY MASS INDEX: 29.42 KG/M2

## 2023-04-03 DIAGNOSIS — J02.9 SORE THROAT: ICD-10-CM

## 2023-04-03 DIAGNOSIS — R05.9 COUGH, UNSPECIFIED TYPE: Primary | ICD-10-CM

## 2023-04-03 DIAGNOSIS — J02.9 PHARYNGITIS, UNSPECIFIED ETIOLOGY: ICD-10-CM

## 2023-04-03 LAB
EXPIRATION DATE: NORMAL
EXPIRATION DATE: NORMAL
FLUAV AG UPPER RESP QL IA.RAPID: NOT DETECTED
FLUBV AG UPPER RESP QL IA.RAPID: NOT DETECTED
INTERNAL CONTROL: NORMAL
INTERNAL CONTROL: NORMAL
Lab: NORMAL
Lab: NORMAL
S PYO AG THROAT QL: NEGATIVE
SARS-COV-2 AG UPPER RESP QL IA.RAPID: NOT DETECTED

## 2023-04-03 PROCEDURE — 99213 OFFICE O/P EST LOW 20 MIN: CPT | Performed by: STUDENT IN AN ORGANIZED HEALTH CARE EDUCATION/TRAINING PROGRAM

## 2023-04-03 PROCEDURE — 1159F MED LIST DOCD IN RCRD: CPT | Performed by: STUDENT IN AN ORGANIZED HEALTH CARE EDUCATION/TRAINING PROGRAM

## 2023-04-03 PROCEDURE — 1160F RVW MEDS BY RX/DR IN RCRD: CPT | Performed by: STUDENT IN AN ORGANIZED HEALTH CARE EDUCATION/TRAINING PROGRAM

## 2023-04-03 PROCEDURE — 3078F DIAST BP <80 MM HG: CPT | Performed by: STUDENT IN AN ORGANIZED HEALTH CARE EDUCATION/TRAINING PROGRAM

## 2023-04-03 PROCEDURE — 87428 SARSCOV & INF VIR A&B AG IA: CPT | Performed by: STUDENT IN AN ORGANIZED HEALTH CARE EDUCATION/TRAINING PROGRAM

## 2023-04-03 PROCEDURE — 87880 STREP A ASSAY W/OPTIC: CPT | Performed by: STUDENT IN AN ORGANIZED HEALTH CARE EDUCATION/TRAINING PROGRAM

## 2023-04-03 PROCEDURE — 3077F SYST BP >= 140 MM HG: CPT | Performed by: STUDENT IN AN ORGANIZED HEALTH CARE EDUCATION/TRAINING PROGRAM

## 2023-04-03 RX ORDER — AMOXICILLIN 875 MG/1
875 TABLET, COATED ORAL 2 TIMES DAILY
Qty: 14 TABLET | Refills: 0 | Status: SHIPPED | OUTPATIENT
Start: 2023-04-03 | End: 2023-04-19

## 2023-04-07 RX ORDER — METOPROLOL SUCCINATE 50 MG/1
TABLET, EXTENDED RELEASE ORAL
Qty: 90 TABLET | Refills: 3 | Status: SHIPPED | OUTPATIENT
Start: 2023-04-07

## 2023-04-07 RX ORDER — SIMVASTATIN 40 MG
TABLET ORAL
Qty: 30 TABLET | Refills: 5 | Status: SHIPPED | OUTPATIENT
Start: 2023-04-07

## 2023-04-07 RX ORDER — DICLOFENAC SODIUM 75 MG/1
TABLET, DELAYED RELEASE ORAL
Qty: 30 TABLET | Refills: 0 | Status: SHIPPED | OUTPATIENT
Start: 2023-04-07

## 2023-04-13 DIAGNOSIS — E78.00 HYPERCHOLESTEROLEMIA: ICD-10-CM

## 2023-04-13 DIAGNOSIS — R73.9 HYPERGLYCEMIA: ICD-10-CM

## 2023-04-13 DIAGNOSIS — I10 BENIGN ESSENTIAL HYPERTENSION: Primary | ICD-10-CM

## 2023-04-13 DIAGNOSIS — Z12.5 SCREENING PSA (PROSTATE SPECIFIC ANTIGEN): ICD-10-CM

## 2023-04-14 LAB
ALBUMIN SERPL-MCNC: 4.7 G/DL (ref 3.5–5.2)
ALBUMIN/GLOB SERPL: 2.1 G/DL
ALP SERPL-CCNC: 75 U/L (ref 39–117)
ALT SERPL-CCNC: 35 U/L (ref 1–41)
APPEARANCE UR: CLEAR
AST SERPL-CCNC: 21 U/L (ref 1–40)
BACTERIA #/AREA URNS HPF: NORMAL /HPF
BASOPHILS # BLD AUTO: 0.03 10*3/MM3 (ref 0–0.2)
BASOPHILS NFR BLD AUTO: 0.5 % (ref 0–1.5)
BILIRUB SERPL-MCNC: 0.5 MG/DL (ref 0–1.2)
BILIRUB UR QL STRIP: NEGATIVE
BUN SERPL-MCNC: 19 MG/DL (ref 8–23)
BUN/CREAT SERPL: 16.8 (ref 7–25)
CALCIUM SERPL-MCNC: 10 MG/DL (ref 8.6–10.5)
CASTS URNS MICRO: NORMAL
CHLORIDE SERPL-SCNC: 101 MMOL/L (ref 98–107)
CHOLEST SERPL-MCNC: 134 MG/DL (ref 0–200)
CHOLEST/HDLC SERPL: 4.96 {RATIO}
CO2 SERPL-SCNC: 32.1 MMOL/L (ref 22–29)
COLOR UR: YELLOW
CREAT SERPL-MCNC: 1.13 MG/DL (ref 0.76–1.27)
EGFRCR SERPLBLD CKD-EPI 2021: 70.4 ML/MIN/1.73
EOSINOPHIL # BLD AUTO: 0.13 10*3/MM3 (ref 0–0.4)
EOSINOPHIL NFR BLD AUTO: 2 % (ref 0.3–6.2)
EPI CELLS #/AREA URNS HPF: NORMAL /HPF
ERYTHROCYTE [DISTWIDTH] IN BLOOD BY AUTOMATED COUNT: 15.4 % (ref 12.3–15.4)
GLOBULIN SER CALC-MCNC: 2.2 GM/DL
GLUCOSE SERPL-MCNC: 126 MG/DL (ref 65–99)
GLUCOSE UR QL STRIP: NEGATIVE
HBA1C MFR BLD: 6.4 % (ref 4.8–5.6)
HCT VFR BLD AUTO: 43.3 % (ref 37.5–51)
HDLC SERPL-MCNC: 27 MG/DL (ref 40–60)
HGB BLD-MCNC: 14.3 G/DL (ref 13–17.7)
HGB UR QL STRIP: NEGATIVE
IMM GRANULOCYTES # BLD AUTO: 0.08 10*3/MM3 (ref 0–0.05)
IMM GRANULOCYTES NFR BLD AUTO: 1.2 % (ref 0–0.5)
KETONES UR QL STRIP: NEGATIVE
LDLC SERPL CALC-MCNC: 80 MG/DL (ref 0–100)
LEUKOCYTE ESTERASE UR QL STRIP: NEGATIVE
LYMPHOCYTES # BLD AUTO: 1.21 10*3/MM3 (ref 0.7–3.1)
LYMPHOCYTES NFR BLD AUTO: 18.8 % (ref 19.6–45.3)
MCH RBC QN AUTO: 29.1 PG (ref 26.6–33)
MCHC RBC AUTO-ENTMCNC: 33 G/DL (ref 31.5–35.7)
MCV RBC AUTO: 88 FL (ref 79–97)
MONOCYTES # BLD AUTO: 0.82 10*3/MM3 (ref 0.1–0.9)
MONOCYTES NFR BLD AUTO: 12.7 % (ref 5–12)
NEUTROPHILS # BLD AUTO: 4.18 10*3/MM3 (ref 1.7–7)
NEUTROPHILS NFR BLD AUTO: 64.8 % (ref 42.7–76)
NITRITE UR QL STRIP: NEGATIVE
NRBC BLD AUTO-RTO: 0 /100 WBC (ref 0–0.2)
PH UR STRIP: 6 [PH] (ref 5–8)
PLATELET # BLD AUTO: 197 10*3/MM3 (ref 140–450)
POTASSIUM SERPL-SCNC: 3.7 MMOL/L (ref 3.5–5.2)
PROT SERPL-MCNC: 6.9 G/DL (ref 6–8.5)
PROT UR QL STRIP: NEGATIVE
PSA SERPL-MCNC: 4.46 NG/ML (ref 0–4)
RBC # BLD AUTO: 4.92 10*6/MM3 (ref 4.14–5.8)
RBC #/AREA URNS HPF: NORMAL /HPF
SODIUM SERPL-SCNC: 143 MMOL/L (ref 136–145)
SP GR UR STRIP: 1.02 (ref 1–1.03)
TRIGL SERPL-MCNC: 152 MG/DL (ref 0–150)
UROBILINOGEN UR STRIP-MCNC: NORMAL MG/DL
VLDLC SERPL CALC-MCNC: 27 MG/DL (ref 5–40)
WBC # BLD AUTO: 6.45 10*3/MM3 (ref 3.4–10.8)
WBC #/AREA URNS HPF: NORMAL /HPF

## 2023-04-19 ENCOUNTER — OFFICE VISIT (OUTPATIENT)
Dept: FAMILY MEDICINE CLINIC | Facility: CLINIC | Age: 68
End: 2023-04-19
Payer: MEDICARE

## 2023-04-19 VITALS
WEIGHT: 223 LBS | BODY MASS INDEX: 29.55 KG/M2 | DIASTOLIC BLOOD PRESSURE: 68 MMHG | OXYGEN SATURATION: 98 % | HEIGHT: 73 IN | HEART RATE: 61 BPM | SYSTOLIC BLOOD PRESSURE: 114 MMHG

## 2023-04-19 DIAGNOSIS — R73.9 HYPERGLYCEMIA: ICD-10-CM

## 2023-04-19 DIAGNOSIS — E78.00 HYPERCHOLESTEROLEMIA: ICD-10-CM

## 2023-04-19 DIAGNOSIS — G47.33 OBSTRUCTIVE SLEEP APNEA: ICD-10-CM

## 2023-04-19 DIAGNOSIS — R97.20 ELEVATED PSA: ICD-10-CM

## 2023-04-19 DIAGNOSIS — I10 BENIGN ESSENTIAL HYPERTENSION: Primary | ICD-10-CM

## 2023-04-19 PROCEDURE — 3074F SYST BP LT 130 MM HG: CPT | Performed by: INTERNAL MEDICINE

## 2023-04-19 PROCEDURE — 3078F DIAST BP <80 MM HG: CPT | Performed by: INTERNAL MEDICINE

## 2023-04-19 PROCEDURE — 99213 OFFICE O/P EST LOW 20 MIN: CPT | Performed by: INTERNAL MEDICINE

## 2023-04-21 PROBLEM — R97.20 ELEVATED PSA: Status: ACTIVE | Noted: 2023-04-21

## 2023-04-21 NOTE — PROGRESS NOTES
Subjective   Jacob Miller is a 67 y.o. male. Patient is here today for   Chief Complaint   Patient presents with   • Hypertension   • Hyperlipidemia          Vitals:    04/19/23 1552   BP: 114/68   Pulse: 61   SpO2: 98%     Body mass index is 29.42 kg/m².      Past Medical History:   Diagnosis Date   • Hyperlipidemia    • Hypertension    • Kidney stones    • PAC (premature atrial contraction)    • Palpitations    • Sleep apnea     USES C-PAP MACHINE      No Known Allergies   Social History     Socioeconomic History   • Marital status:    Tobacco Use   • Smoking status: Never   • Smokeless tobacco: Never   • Tobacco comments:     Daily caffeine use   Vaping Use   • Vaping Use: Never used   Substance and Sexual Activity   • Alcohol use: Yes     Comment: occ   • Drug use: No   • Sexual activity: Defer        Current Outpatient Medications:   •  Alcohol Swabs pads, Clean area of injection as needed 3 times daily., Disp: 100 each, Rfl: 3  •  amLODIPine (NORVASC) 5 MG tablet, Take 2 tablets by mouth Daily., Disp: 90 tablet, Rfl: 3  •  diclofenac (VOLTAREN) 75 MG EC tablet, TAKE ONE TABLET BY MOUTH DAILY, Disp: 30 tablet, Rfl: 0  •  lisinopril-hydrochlorothiazide (PRINZIDE,ZESTORETIC) 20-12.5 MG per tablet, TAKE TWO TABLETS BY MOUTH EVERY MORNING, Disp: 60 tablet, Rfl: 2  •  metoprolol succinate XL (TOPROL-XL) 50 MG 24 hr tablet, TAKE ONE TABLET BY MOUTH DAILY, Disp: 90 tablet, Rfl: 3  •  OneTouch Delica Lancets 33G misc, Check glucose 3 times daily, Disp: 100 each, Rfl: 3  •  OneTouch Ultra test strip, USE TO TEST FOR BLOOD SUGAR LEVELS ONCE DAILY AS DIRECTED, Disp: 100 each, Rfl: 11  •  sildenafil (Viagra) 100 MG tablet, Take 1 tablet by mouth Daily As Needed for Erectile Dysfunction., Disp: 30 tablet, Rfl: 1  •  simvastatin (ZOCOR) 40 MG tablet, TAKE ONE TABLET BY MOUTH ONCE NIGHTLY, Disp: 30 tablet, Rfl: 5  •  Triprolidine-Pseudoephedrine (ANTIHISTAMINE PO), Take  by mouth., Disp: , Rfl:      Objective      History of Present Illness  He is here to follow-up on hypertension and hypercholesterolemia.    He feels well.  Hypertension    Hyperlipidemia         Review of Systems   Constitutional: Negative.    HENT: Negative.    Respiratory: Negative.    Cardiovascular: Negative.    Musculoskeletal: Negative.    Psychiatric/Behavioral: Negative.        Physical Exam  Vitals and nursing note reviewed.   Constitutional:       Appearance: Normal appearance.      Comments: Pleasant, neatly groomed, no distress.   Neck:      Vascular: No carotid bruit.   Cardiovascular:      Rate and Rhythm: Regular rhythm.      Heart sounds: Normal heart sounds. No murmur heard.    No gallop.   Pulmonary:      Effort: No respiratory distress.      Breath sounds: Normal breath sounds. No wheezing or rales.   Neurological:      Mental Status: He is alert and oriented to person, place, and time.   Psychiatric:         Mood and Affect: Mood normal.         Behavior: Behavior normal.         Thought Content: Thought content normal.           Problems Addressed this Visit        Cardiac and Vasculature    Benign essential hypertension - Primary    Hypercholesterolemia       Endocrine and Metabolic    Hyperglycemia       Genitourinary and Reproductive     Elevated PSA       Sleep    Obstructive sleep apnea   Diagnoses       Codes Comments    Benign essential hypertension    -  Primary ICD-10-CM: I10  ICD-9-CM: 401.1     Hypercholesterolemia     ICD-10-CM: E78.00  ICD-9-CM: 272.0     Hyperglycemia     ICD-10-CM: R73.9  ICD-9-CM: 790.29     Elevated PSA     ICD-10-CM: R97.20  ICD-9-CM: 790.93     Obstructive sleep apnea     ICD-10-CM: G47.33  ICD-9-CM: 327.23             PLAN  His blood sugars are a bit elevated.  He is moderately overweight with a BMI of 29.  I think all we need to do about his elevated glucose is to encourage him to get regular aerobic activity per American Heart Association guidelines of 30 minutes a day of brisk aerobic activity  5 days a week and to encourage weight loss with decreased caloric intake.    His hypertension is well controlled.    His hypercholesterolemia is pretty well controlled on simvastatin 40 mg daily.    He has an elevated PSA and this is followed up by Dr. Pastor his urologist.    He has obstructive sleep apnea and is compliant with use of CPAP.    I asked him to follow-up in about 6 months.  Fasting labs prior that visit should include: Lipid profile, comprehensive metabolic panel, CBC, urinalysis, hemoglobin A1c.  It would be good to screen him for vitamin D deficiency as well.  No follow-ups on file.

## 2023-04-26 ENCOUNTER — TRANSCRIBE ORDERS (OUTPATIENT)
Dept: ADMINISTRATIVE | Facility: HOSPITAL | Age: 68
End: 2023-04-26
Payer: MEDICARE

## 2023-04-26 ENCOUNTER — HOSPITAL ENCOUNTER (OUTPATIENT)
Dept: GENERAL RADIOLOGY | Facility: HOSPITAL | Age: 68
Discharge: HOME | End: 2023-04-26
Payer: MEDICARE

## 2023-04-26 ENCOUNTER — LAB (OUTPATIENT)
Dept: LAB | Facility: HOSPITAL | Age: 68
End: 2023-04-26
Payer: MEDICARE

## 2023-04-26 ENCOUNTER — HOSPITAL ENCOUNTER (OUTPATIENT)
Dept: CARDIOLOGY | Facility: HOSPITAL | Age: 68
Discharge: HOME | End: 2023-04-26
Payer: MEDICARE

## 2023-04-26 DIAGNOSIS — M71.9: ICD-10-CM

## 2023-04-26 DIAGNOSIS — Z01.818 PRE-OP EXAM: ICD-10-CM

## 2023-04-26 DIAGNOSIS — Z01.811 PRE-OP CHEST EXAM: ICD-10-CM

## 2023-04-26 DIAGNOSIS — M71.9: Primary | ICD-10-CM

## 2023-04-26 LAB
ALBUMIN SERPL-MCNC: 4.4 G/DL (ref 3.5–5.2)
ALBUMIN/GLOB SERPL: 1.8 G/DL
ALP SERPL-CCNC: 72 U/L (ref 39–117)
ALT SERPL W P-5'-P-CCNC: 39 U/L (ref 1–41)
ANION GAP SERPL CALCULATED.3IONS-SCNC: 10 MMOL/L (ref 5–15)
AST SERPL-CCNC: 29 U/L (ref 1–40)
BASOPHILS # BLD AUTO: 0.03 10*3/MM3 (ref 0–0.2)
BASOPHILS NFR BLD AUTO: 0.5 % (ref 0–1.5)
BILIRUB SERPL-MCNC: 0.4 MG/DL (ref 0–1.2)
BUN SERPL-MCNC: 17 MG/DL (ref 8–23)
BUN/CREAT SERPL: 17.7 (ref 7–25)
CALCIUM SPEC-SCNC: 9.4 MG/DL (ref 8.6–10.5)
CHLORIDE SERPL-SCNC: 102 MMOL/L (ref 98–107)
CO2 SERPL-SCNC: 32 MMOL/L (ref 22–29)
CREAT SERPL-MCNC: 0.96 MG/DL (ref 0.76–1.27)
DEPRECATED RDW RBC AUTO: 46.6 FL (ref 37–54)
EGFRCR SERPLBLD CKD-EPI 2021: 85.6 ML/MIN/1.73
EOSINOPHIL # BLD AUTO: 0.13 10*3/MM3 (ref 0–0.4)
EOSINOPHIL NFR BLD AUTO: 2.1 % (ref 0.3–6.2)
ERYTHROCYTE [DISTWIDTH] IN BLOOD BY AUTOMATED COUNT: 15.2 % (ref 12.3–15.4)
GLOBULIN UR ELPH-MCNC: 2.4 GM/DL
GLUCOSE SERPL-MCNC: 102 MG/DL (ref 65–99)
HCT VFR BLD AUTO: 40 % (ref 37.5–51)
HGB BLD-MCNC: 13.7 G/DL (ref 13–17.7)
IMM GRANULOCYTES # BLD AUTO: 0.06 10*3/MM3 (ref 0–0.05)
IMM GRANULOCYTES NFR BLD AUTO: 1 % (ref 0–0.5)
LYMPHOCYTES # BLD AUTO: 1.47 10*3/MM3 (ref 0.7–3.1)
LYMPHOCYTES NFR BLD AUTO: 24.3 % (ref 19.6–45.3)
MCH RBC QN AUTO: 29.2 PG (ref 26.6–33)
MCHC RBC AUTO-ENTMCNC: 34.3 G/DL (ref 31.5–35.7)
MCV RBC AUTO: 85.3 FL (ref 79–97)
MONOCYTES # BLD AUTO: 1.01 10*3/MM3 (ref 0.1–0.9)
MONOCYTES NFR BLD AUTO: 16.7 % (ref 5–12)
NEUTROPHILS NFR BLD AUTO: 3.35 10*3/MM3 (ref 1.7–7)
NEUTROPHILS NFR BLD AUTO: 55.4 % (ref 42.7–76)
NRBC BLD AUTO-RTO: 0 /100 WBC (ref 0–0.2)
PLATELET # BLD AUTO: 177 10*3/MM3 (ref 140–450)
PMV BLD AUTO: 10.8 FL (ref 6–12)
POTASSIUM SERPL-SCNC: 3.5 MMOL/L (ref 3.5–5.2)
PROT SERPL-MCNC: 6.8 G/DL (ref 6–8.5)
QT INTERVAL: 394 MS
RBC # BLD AUTO: 4.69 10*6/MM3 (ref 4.14–5.8)
SODIUM SERPL-SCNC: 144 MMOL/L (ref 136–145)
WBC NRBC COR # BLD: 6.05 10*3/MM3 (ref 3.4–10.8)

## 2023-04-26 PROCEDURE — 71046 X-RAY EXAM CHEST 2 VIEWS: CPT

## 2023-04-26 PROCEDURE — 80053 COMPREHEN METABOLIC PANEL: CPT

## 2023-04-26 PROCEDURE — 85025 COMPLETE CBC W/AUTO DIFF WBC: CPT

## 2023-04-26 PROCEDURE — 93005 ELECTROCARDIOGRAM TRACING: CPT | Performed by: ORTHOPAEDIC SURGERY

## 2023-04-26 PROCEDURE — 36415 COLL VENOUS BLD VENIPUNCTURE: CPT

## 2023-05-18 RX ORDER — DICLOFENAC SODIUM 75 MG/1
TABLET, DELAYED RELEASE ORAL
Qty: 30 TABLET | Refills: 0 | Status: SHIPPED | OUTPATIENT
Start: 2023-05-18

## 2023-05-26 RX ORDER — LISINOPRIL AND HYDROCHLOROTHIAZIDE 20; 12.5 MG/1; MG/1
TABLET ORAL
Qty: 60 TABLET | Refills: 2 | Status: SHIPPED | OUTPATIENT
Start: 2023-05-26

## 2023-05-30 ENCOUNTER — HOSPITAL ENCOUNTER (OUTPATIENT)
Facility: HOSPITAL | Age: 68
Discharge: HOME | End: 2023-05-30
Admitting: EMERGENCY MEDICINE

## 2023-05-30 ENCOUNTER — APPOINTMENT (OUTPATIENT)
Dept: GENERAL RADIOLOGY | Facility: HOSPITAL | Age: 68
End: 2023-05-30

## 2023-05-30 VITALS
DIASTOLIC BLOOD PRESSURE: 81 MMHG | HEART RATE: 62 BPM | HEIGHT: 73 IN | WEIGHT: 225 LBS | BODY MASS INDEX: 29.82 KG/M2 | OXYGEN SATURATION: 97 % | TEMPERATURE: 97.8 F | RESPIRATION RATE: 17 BRPM | SYSTOLIC BLOOD PRESSURE: 168 MMHG

## 2023-05-30 DIAGNOSIS — M79.641 HAND PAIN, RIGHT: Primary | ICD-10-CM

## 2023-05-30 PROCEDURE — 73130 X-RAY EXAM OF HAND: CPT

## 2023-05-30 PROCEDURE — G0463 HOSPITAL OUTPT CLINIC VISIT: HCPCS | Performed by: PHYSICIAN ASSISTANT

## 2023-05-30 RX ORDER — CEPHALEXIN 500 MG/1
500 CAPSULE ORAL 4 TIMES DAILY
Qty: 40 CAPSULE | Refills: 0 | Status: SHIPPED | OUTPATIENT
Start: 2023-05-30 | End: 2023-06-09

## 2023-05-30 NOTE — FSED PROVIDER NOTE
Subjective   History of Present Illness  Patient is a 67-year-old gentleman that is been having pain in the a right hand at the second MCP joint area primarily on the dorsal aspect.  He does a lot of work with his hands and does not know of any trauma or injury.  He has not been running fevers.        Review of Systems   Musculoskeletal: Positive for arthralgias and joint swelling.   All other systems reviewed and are negative.      Past Medical History:   Diagnosis Date   • Hyperlipidemia    • Hypertension    • Kidney stones    • PAC (premature atrial contraction)    • Palpitations    • Sleep apnea     USES C-PAP MACHINE       No Known Allergies    Past Surgical History:   Procedure Laterality Date   • BELPHAROPTOSIS REPAIR Bilateral    • HERNIA REPAIR     • ROTATOR CUFF REPAIR         Family History   Problem Relation Age of Onset   • Heart disease Mother    • Hypertension Mother    • Diabetes Mother    • Heart disease Father    • Arrhythmia Brother        Social History     Socioeconomic History   • Marital status:    Tobacco Use   • Smoking status: Never   • Smokeless tobacco: Never   • Tobacco comments:     Daily caffeine use   Vaping Use   • Vaping Use: Never used   Substance and Sexual Activity   • Alcohol use: Yes     Comment: occ   • Drug use: No   • Sexual activity: Defer           Objective   Physical Exam  Constitutional:       Appearance: Normal appearance. He is normal weight.   Eyes:      Conjunctiva/sclera: Conjunctivae normal.   Cardiovascular:      Rate and Rhythm: Normal rate.      Pulses: Normal pulses.   Pulmonary:      Effort: Pulmonary effort is normal.   Musculoskeletal:         General: Normal range of motion.      Cervical back: Normal range of motion.   Skin:     General: Skin is warm and dry.      Comments: Mild redness and swelling over the second and third right MCPs on the Dorsal aspect of hanD, full range of motion   Neurological:      General: No focal deficit present.       Gait: Gait normal.   Psychiatric:         Mood and Affect: Mood normal.         Behavior: Behavior normal.         Thought Content: Thought content normal.         Judgment: Judgment normal.         Procedures           ED Course                                           Medical Decision Making  Patient is nontoxic and well-appearing.  He hasMild redness and swelling over the second and third right MCPs on the Dorsal aspect of hanD, full range of motion.  He is not tender along the flexor or extensor surfaces.  Do not appreciate crepitus.  Neurovascular intact.  No snuffbox pain.  It is not tender as would be expected with gout, low probability.  X-ray shows osteoarthritis that is moderate.  It is possible this could be an arthritic flare up but will not rule out cellulitis and will treat with cephalexin.  He is medically cleared to follow-up with hand and return to emergency room if he develops fevers, lymphangitis or any concerns.    Hand pain, right: complicated acute illness or injury  Amount and/or Complexity of Data Reviewed  Radiology:  Decision-making details documented in ED Course.      Risk  Prescription drug management.          Final diagnoses:   Hand pain, right       ED Disposition  ED Disposition     ED Disposition   Discharge    Condition   Stable    Comment   --             [facility]  [address]  [facility] [address]  [phone]        Rodri Ibarra MD  [address]  [facility] [address]  [phone]; also of[facility] at [address]; [address]         Medication List      New Prescriptions    cephalexin 500 MG capsule  Commonly known as: KEFLEX  Take 1 capsule by mouth 4 (Four) Times a Day for 10 days.           Where to Get Your Medications      These medications were sent to [facility] [id number] - [place], [place] - [address] AT [facility] - [phone] [facility] [phone] [place] [address]  [address], [facility] [address]    Phone: [phone]   · cephalexin 500 MG capsule

## 2023-05-30 NOTE — ED NOTES
Pt c/o pain and swelling of the knuckle at the base of the index finger on the right hand for 3 days. Denies injury

## 2023-07-25 RX ORDER — DICLOFENAC SODIUM 75 MG/1
TABLET, DELAYED RELEASE ORAL
Qty: 30 TABLET | Refills: 0 | Status: SHIPPED | OUTPATIENT
Start: 2023-07-25

## 2023-08-21 DIAGNOSIS — R73.9 HYPERGLYCEMIA: ICD-10-CM

## 2023-08-21 DIAGNOSIS — E55.9 VITAMIN D DEFICIENCY: ICD-10-CM

## 2023-08-21 DIAGNOSIS — I10 BENIGN ESSENTIAL HYPERTENSION: ICD-10-CM

## 2023-08-21 DIAGNOSIS — E78.00 HYPERCHOLESTEROLEMIA: Primary | ICD-10-CM

## 2023-08-22 LAB
25(OH)D3+25(OH)D2 SERPL-MCNC: 23.7 NG/ML (ref 30–100)
ALBUMIN SERPL-MCNC: 4.7 G/DL (ref 3.5–5.2)
ALBUMIN/GLOB SERPL: 2.2 G/DL
ALP SERPL-CCNC: 76 U/L (ref 39–117)
ALT SERPL-CCNC: 38 U/L (ref 1–41)
APPEARANCE UR: CLEAR
AST SERPL-CCNC: 22 U/L (ref 1–40)
BACTERIA #/AREA URNS HPF: NORMAL /HPF
BASOPHILS # BLD AUTO: 0.06 10*3/MM3 (ref 0–0.2)
BASOPHILS NFR BLD AUTO: 1.2 % (ref 0–1.5)
BILIRUB SERPL-MCNC: 0.5 MG/DL (ref 0–1.2)
BILIRUB UR QL STRIP: NEGATIVE
BUN SERPL-MCNC: 19 MG/DL (ref 8–23)
BUN/CREAT SERPL: 18.6 (ref 7–25)
CALCIUM SERPL-MCNC: 10.1 MG/DL (ref 8.6–10.5)
CASTS URNS MICRO: NORMAL
CHLORIDE SERPL-SCNC: 102 MMOL/L (ref 98–107)
CHOLEST SERPL-MCNC: 115 MG/DL (ref 0–200)
CHOLEST/HDLC SERPL: 4.79 {RATIO}
CO2 SERPL-SCNC: 31 MMOL/L (ref 22–29)
COLOR UR: YELLOW
CREAT SERPL-MCNC: 1.02 MG/DL (ref 0.76–1.27)
EGFRCR SERPLBLD CKD-EPI 2021: 79.1 ML/MIN/1.73
EOSINOPHIL # BLD AUTO: 0.4 10*3/MM3 (ref 0–0.4)
EOSINOPHIL NFR BLD AUTO: 7.8 % (ref 0.3–6.2)
EPI CELLS #/AREA URNS HPF: NORMAL /HPF
ERYTHROCYTE [DISTWIDTH] IN BLOOD BY AUTOMATED COUNT: 14.7 % (ref 12.3–15.4)
GLOBULIN SER CALC-MCNC: 2.1 GM/DL
GLUCOSE SERPL-MCNC: 124 MG/DL (ref 65–99)
GLUCOSE UR QL STRIP: NEGATIVE
HBA1C MFR BLD: 6.1 % (ref 4.8–5.6)
HCT VFR BLD AUTO: 42.9 % (ref 37.5–51)
HDLC SERPL-MCNC: 24 MG/DL (ref 40–60)
HGB BLD-MCNC: 14 G/DL (ref 13–17.7)
HGB UR QL STRIP: NEGATIVE
IMM GRANULOCYTES # BLD AUTO: 0.05 10*3/MM3 (ref 0–0.05)
IMM GRANULOCYTES NFR BLD AUTO: 1 % (ref 0–0.5)
KETONES UR QL STRIP: NEGATIVE
LDLC SERPL CALC-MCNC: 54 MG/DL (ref 0–100)
LEUKOCYTE ESTERASE UR QL STRIP: NEGATIVE
LYMPHOCYTES # BLD AUTO: 1.23 10*3/MM3 (ref 0.7–3.1)
LYMPHOCYTES NFR BLD AUTO: 23.9 % (ref 19.6–45.3)
MCH RBC QN AUTO: 27.9 PG (ref 26.6–33)
MCHC RBC AUTO-ENTMCNC: 32.6 G/DL (ref 31.5–35.7)
MCV RBC AUTO: 85.6 FL (ref 79–97)
MONOCYTES # BLD AUTO: 0.77 10*3/MM3 (ref 0.1–0.9)
MONOCYTES NFR BLD AUTO: 15 % (ref 5–12)
NEUTROPHILS # BLD AUTO: 2.63 10*3/MM3 (ref 1.7–7)
NEUTROPHILS NFR BLD AUTO: 51.1 % (ref 42.7–76)
NITRITE UR QL STRIP: NEGATIVE
NRBC BLD AUTO-RTO: 0 /100 WBC (ref 0–0.2)
PH UR STRIP: 6.5 [PH] (ref 5–8)
PLATELET # BLD AUTO: 162 10*3/MM3 (ref 140–450)
POTASSIUM SERPL-SCNC: 3.7 MMOL/L (ref 3.5–5.2)
PROT SERPL-MCNC: 6.8 G/DL (ref 6–8.5)
PROT UR QL STRIP: NEGATIVE
RBC # BLD AUTO: 5.01 10*6/MM3 (ref 4.14–5.8)
RBC #/AREA URNS HPF: NORMAL /HPF
SODIUM SERPL-SCNC: 142 MMOL/L (ref 136–145)
SP GR UR STRIP: 1.02 (ref 1–1.03)
TRIGL SERPL-MCNC: 232 MG/DL (ref 0–150)
UROBILINOGEN UR STRIP-MCNC: NORMAL MG/DL
VLDLC SERPL CALC-MCNC: 37 MG/DL (ref 5–40)
WBC # BLD AUTO: 5.14 10*3/MM3 (ref 3.4–10.8)
WBC #/AREA URNS HPF: NORMAL /HPF

## 2023-08-24 ENCOUNTER — OFFICE VISIT (OUTPATIENT)
Dept: FAMILY MEDICINE CLINIC | Facility: CLINIC | Age: 68
End: 2023-08-24
Payer: MEDICARE

## 2023-08-24 VITALS
TEMPERATURE: 97.8 F | RESPIRATION RATE: 18 BRPM | DIASTOLIC BLOOD PRESSURE: 76 MMHG | OXYGEN SATURATION: 99 % | HEIGHT: 73 IN | SYSTOLIC BLOOD PRESSURE: 144 MMHG | HEART RATE: 63 BPM | WEIGHT: 226.7 LBS | BODY MASS INDEX: 30.05 KG/M2

## 2023-08-24 DIAGNOSIS — I10 BENIGN ESSENTIAL HYPERTENSION: ICD-10-CM

## 2023-08-24 DIAGNOSIS — E78.00 HYPERCHOLESTEROLEMIA: Primary | ICD-10-CM

## 2023-08-24 PROCEDURE — 3077F SYST BP >= 140 MM HG: CPT | Performed by: INTERNAL MEDICINE

## 2023-08-24 PROCEDURE — 99213 OFFICE O/P EST LOW 20 MIN: CPT | Performed by: INTERNAL MEDICINE

## 2023-08-24 PROCEDURE — 3078F DIAST BP <80 MM HG: CPT | Performed by: INTERNAL MEDICINE

## 2023-08-24 NOTE — PROGRESS NOTES
Subjective   Jacob Miller is a 68 y.o. male. Patient is here today for   Chief Complaint   Patient presents with    Hypertension    Hyperlipidemia          Vitals:    08/24/23 1008   BP: 144/76   Pulse: 63   Resp: 18   Temp: 97.8 øF (36.6 øC)   SpO2: 99%     Body mass index is 29.92 kg/mý.      Past Medical History:   Diagnosis Date    Hyperlipidemia     Hypertension     Kidney stones     PAC (premature atrial contraction)     Palpitations     Sleep apnea     USES C-PAP MACHINE      No Known Allergies   Social History     Socioeconomic History    Marital status:    Tobacco Use    Smoking status: Never     Passive exposure: Never    Smokeless tobacco: Never    Tobacco comments:     Daily caffeine use   Vaping Use    Vaping Use: Never used   Substance and Sexual Activity    Alcohol use: Yes     Comment: occ    Drug use: No    Sexual activity: Defer        Current Outpatient Medications:     Alcohol Swabs pads, Clean area of injection as needed 3 times daily., Disp: 100 each, Rfl: 3    amLODIPine (NORVASC) 5 MG tablet, Take 2 tablets by mouth Daily., Disp: 90 tablet, Rfl: 3    diclofenac (VOLTAREN) 75 MG EC tablet, TAKE 1 TABLET BY MOUTH DAILY, Disp: 30 tablet, Rfl: 0    lisinopril-hydrochlorothiazide (PRINZIDE,ZESTORETIC) 20-12.5 MG per tablet, TAKE TWO TABLETS BY MOUTH EVERY MORNING, Disp: 60 tablet, Rfl: 2    metoprolol succinate XL (TOPROL-XL) 50 MG 24 hr tablet, TAKE ONE TABLET BY MOUTH DAILY, Disp: 90 tablet, Rfl: 3    OneTouch Delica Lancets 33G misc, Check glucose 3 times daily, Disp: 100 each, Rfl: 3    OneTouch Ultra test strip, USE TO TEST FOR BLOOD SUGAR LEVELS ONCE DAILY AS DIRECTED, Disp: 100 each, Rfl: 11    simvastatin (ZOCOR) 40 MG tablet, TAKE ONE TABLET BY MOUTH ONCE NIGHTLY, Disp: 30 tablet, Rfl: 5    sildenafil (Viagra) 100 MG tablet, Take 1 tablet by mouth Daily As Needed for Erectile Dysfunction. (Patient not taking: Reported on 8/24/2023), Disp: 30 tablet, Rfl: 1     Triprolidine-Pseudoephedrine (ANTIHISTAMINE PO), Take  by mouth. (Patient not taking: Reported on 8/24/2023), Disp: , Rfl:      Objective     History of Present Illness  He is here to follow-up on hypertension and hypercholesterolemia.    He has no complaints.  Hypertension    Hyperlipidemia       Review of Systems   Constitutional: Negative.    HENT: Negative.     Respiratory: Negative.     Cardiovascular: Negative.    Musculoskeletal: Negative.    Psychiatric/Behavioral: Negative.       Physical Exam  Vitals and nursing note reviewed.   Constitutional:       General: He is not in acute distress.     Appearance: Normal appearance. He is not ill-appearing, toxic-appearing or diaphoretic.      Comments: Pleasant, neatly groomed, no distress.   Neck:      Vascular: No carotid bruit.   Cardiovascular:      Rate and Rhythm: Regular rhythm.      Heart sounds: Normal heart sounds. No murmur heard.    No gallop.   Pulmonary:      Effort: No respiratory distress.      Breath sounds: Normal breath sounds. No wheezing or rales.   Neurological:      Mental Status: He is alert and oriented to person, place, and time.   Psychiatric:         Mood and Affect: Mood normal.         Behavior: Behavior normal.         Thought Content: Thought content normal.         Problems Addressed this Visit          Cardiac and Vasculature    Benign essential hypertension    Hypercholesterolemia - Primary     Diagnoses         Codes Comments    Hypercholesterolemia    -  Primary ICD-10-CM: E78.00  ICD-9-CM: 272.0     Benign essential hypertension     ICD-10-CM: I10  ICD-9-CM: 401.1               PLAN  His hypercholesterolemia is well controlled.  He did have an elevation of his triglycerides which is not normally there.  Perhaps this is a dietary effect.    His hypertension is fairly well controlled.  He is moderately overweight and of asked him to do his best to lose weight via regular aerobic activity and decrease caloric intake.    I had  considered to increase his metoprolol to 100 mg daily.  He is taking lisinopril/hydrochlorothiazide, metoprolol, amlodipine to manage his hypertension.  He has obstructive sleep apnea and is compliant with use of CPAP.    I asked her to follow-up with me in about 3 months.  Fasting labs prior to that visit should include: Lipid profile, comprehensive metabolic panel, CBC, urinalysis.  He will follow-up in about 4 months.  That visit should be for a annual wellness visit.  No follow-ups on file.

## 2023-08-28 RX ORDER — DICLOFENAC SODIUM 75 MG/1
TABLET, DELAYED RELEASE ORAL
Qty: 30 TABLET | Refills: 0 | Status: SHIPPED | OUTPATIENT
Start: 2023-08-28

## 2023-08-28 NOTE — TELEPHONE ENCOUNTER
Rx Refill Note  Requested Prescriptions     Pending Prescriptions Disp Refills    diclofenac (VOLTAREN) 75 MG EC tablet [Pharmacy Med Name: DICLOFENAC SOD EC 75 MG TAB] 30 tablet 0     Sig: TAKE 1 TABLET BY MOUTH DAILY      Last office visit with prescribing clinician: 8/24/2023   Last telemedicine visit with prescribing clinician: Visit date not found   Next office visit with prescribing clinician: 12/28/2023                         Would you like a call back once the refill request has been completed: [] Yes [] No    If the office needs to give you a call back, can they leave a voicemail: [] Yes [] No    Cheyanne Cardoza MA  08/28/23, 08:28 EDT

## 2023-09-20 RX ORDER — LISINOPRIL AND HYDROCHLOROTHIAZIDE 20; 12.5 MG/1; MG/1
TABLET ORAL
Qty: 60 TABLET | Refills: 2 | Status: SHIPPED | OUTPATIENT
Start: 2023-09-20

## 2023-09-29 RX ORDER — DICLOFENAC SODIUM 75 MG/1
TABLET, DELAYED RELEASE ORAL
Qty: 30 TABLET | Refills: 0 | Status: SHIPPED | OUTPATIENT
Start: 2023-09-29

## 2023-09-29 NOTE — TELEPHONE ENCOUNTER
Rx Refill Note  Requested Prescriptions     Pending Prescriptions Disp Refills    diclofenac (VOLTAREN) 75 MG EC tablet [Pharmacy Med Name: DICLOFENAC SOD EC 75 MG TAB] 30 tablet 0     Sig: TAKE 1 TABLET BY MOUTH DAILY      Last office visit with prescribing clinician: 8/24/2023   Last telemedicine visit with prescribing clinician: Visit date not found   Next office visit with prescribing clinician: 12/28/2023                         Would you like a call back once the refill request has been completed: [] Yes [] No    If the office needs to give you a call back, can they leave a voicemail: [] Yes [] No    Cheyanne Cardoza MA  09/29/23, 07:38 EDT

## 2023-10-16 RX ORDER — AMLODIPINE BESYLATE 5 MG/1
10 TABLET ORAL DAILY
Qty: 90 TABLET | Refills: 3 | Status: SHIPPED | OUTPATIENT
Start: 2023-10-16

## 2023-11-07 ENCOUNTER — TELEPHONE (OUTPATIENT)
Dept: FAMILY MEDICINE CLINIC | Facility: CLINIC | Age: 68
End: 2023-11-07
Payer: MEDICARE

## 2023-11-07 RX ORDER — DICLOFENAC SODIUM 75 MG/1
TABLET, DELAYED RELEASE ORAL
Qty: 30 TABLET | Refills: 0 | Status: SHIPPED | OUTPATIENT
Start: 2023-11-07

## 2023-11-07 NOTE — TELEPHONE ENCOUNTER
Caller: Jacob Miller    Relationship to patient: Self    Best call back number: [phone]     Chief complaint: TETANUS AND PNEUMONIA SHOT    Type of visit:NURSE VISIT    Requested date: ASAP    If rescheduling, when is the original appointment: NA    Additional notes: PATIENT HAS SOME CUTS ON HIS HANDS AND WANTS A TETANUS SHOT AND PNEUMONIA SHOT. PLEASE CALL.

## 2023-11-07 NOTE — TELEPHONE ENCOUNTER
Rx Refill Note  Requested Prescriptions     Pending Prescriptions Disp Refills    diclofenac (VOLTAREN) 75 MG EC tablet [Pharmacy Med Name: DICLOFENAC SOD EC 75 MG TAB] 30 tablet 0     Sig: TAKE 1 TABLET BY MOUTH DAILY      Last office visit with prescribing clinician: 8/24/2023   Last telemedicine visit with prescribing clinician: Visit date not found   Next office visit with prescribing clinician: 11/7/2023                         Would you like a call back once the refill request has been completed: [] Yes [] No    If the office needs to give you a call back, can they leave a voicemail: [] Yes [] No    Cheyanne Cardoza MA  11/07/23, 10:01 EST

## 2023-11-07 NOTE — TELEPHONE ENCOUNTER
Caller: Jaocb Miller    Relationship: Self    Best call back number: [phone]     What was the call regarding: PATIENT IS OUT OF MEDICATION NOW. PLEASE ADVISE.

## 2023-12-04 RX ORDER — DICLOFENAC SODIUM 75 MG/1
TABLET, DELAYED RELEASE ORAL
Qty: 30 TABLET | Refills: 0 | Status: SHIPPED | OUTPATIENT
Start: 2023-12-04

## 2023-12-04 NOTE — TELEPHONE ENCOUNTER
Rx Refill Note  Requested Prescriptions     Pending Prescriptions Disp Refills    diclofenac (VOLTAREN) 75 MG EC tablet [Pharmacy Med Name: DICLOFENAC SOD EC 75 MG TAB] 30 tablet 0     Sig: TAKE 1 TABLET BY MOUTH DAILY      Last office visit with prescribing clinician: 8/24/2023   Last telemedicine visit with prescribing clinician: Visit date not found   Next office visit with prescribing clinician: 12/28/2023                         Would you like a call back once the refill request has been completed: [] Yes [] No    If the office needs to give you a call back, can they leave a voicemail: [] Yes [] No    Cheyanne Cardoza MA  12/04/23, 12:19 EST

## 2023-12-10 ENCOUNTER — HOSPITAL ENCOUNTER (OUTPATIENT)
Facility: HOSPITAL | Age: 68
Discharge: HOME | End: 2023-12-10
Attending: EMERGENCY MEDICINE | Admitting: EMERGENCY MEDICINE
Payer: MEDICARE

## 2023-12-10 VITALS
BODY MASS INDEX: 28.62 KG/M2 | DIASTOLIC BLOOD PRESSURE: 83 MMHG | SYSTOLIC BLOOD PRESSURE: 178 MMHG | WEIGHT: 223 LBS | OXYGEN SATURATION: 99 % | HEART RATE: 66 BPM | TEMPERATURE: 98.6 F | HEIGHT: 74 IN | RESPIRATION RATE: 16 BRPM

## 2023-12-10 DIAGNOSIS — J06.9 UPPER RESPIRATORY TRACT INFECTION, UNSPECIFIED TYPE: Primary | ICD-10-CM

## 2023-12-10 LAB
FLUAV SUBTYP SPEC NAA+PROBE: NOT DETECTED
FLUBV RNA ISLT QL NAA+PROBE: NOT DETECTED
RSV RNA NPH QL NAA+NON-PROBE: NOT DETECTED
SARS-COV-2 RNA RESP QL NAA+PROBE: NOT DETECTED
STREP A PCR: NOT DETECTED

## 2023-12-10 PROCEDURE — G0463 HOSPITAL OUTPT CLINIC VISIT: HCPCS | Performed by: NURSE PRACTITIONER

## 2023-12-10 PROCEDURE — 87636 SARSCOV2 & INF A&B AMP PRB: CPT | Performed by: NURSE PRACTITIONER

## 2023-12-10 PROCEDURE — 87651 STREP A DNA AMP PROBE: CPT | Performed by: NURSE PRACTITIONER

## 2023-12-10 PROCEDURE — 87634 RSV DNA/RNA AMP PROBE: CPT | Performed by: NURSE PRACTITIONER

## 2023-12-10 RX ORDER — BROMPHENIRAMINE MALEATE, PSEUDOEPHEDRINE HYDROCHLORIDE, AND DEXTROMETHORPHAN HYDROBROMIDE 2; 30; 10 MG/5ML; MG/5ML; MG/5ML
5 SYRUP ORAL 4 TIMES DAILY PRN
Qty: 120 ML | Refills: 0 | Status: SHIPPED | OUTPATIENT
Start: 2023-12-10

## 2023-12-10 NOTE — FSED PROVIDER NOTE
Subjective   History of Present Illness  Patient is a 68-year-old male who presents complaining of cough, congestion, sore throat, headache for the last 1 to 2 days.  He denies any fever or chills.  Denies any chest pain, shortness of breath.  Denies any vomiting or diarrhea.      Review of Systems   HENT:  Positive for congestion and sore throat.    Respiratory:  Positive for cough.    All other systems reviewed and are negative.      Past Medical History:   Diagnosis Date    Hyperlipidemia     Hypertension     Kidney stones     PAC (premature atrial contraction)     Palpitations     Sleep apnea     USES C-PAP MACHINE       No Known Allergies    Past Surgical History:   Procedure Laterality Date    BELPHAROPTOSIS REPAIR Bilateral     HERNIA REPAIR      ROTATOR CUFF REPAIR         Family History   Problem Relation Age of Onset    Heart disease Mother     Hypertension Mother     Diabetes Mother     Heart disease Father     Arrhythmia Brother        Social History     Socioeconomic History    Marital status:    Tobacco Use    Smoking status: Never     Passive exposure: Never    Smokeless tobacco: Never    Tobacco comments:     Daily caffeine use   Vaping Use    Vaping Use: Never used   Substance and Sexual Activity    Alcohol use: Yes     Comment: occ    Drug use: No    Sexual activity: Defer           Objective   Physical Exam  Vitals and nursing note reviewed.   Constitutional:       Appearance: Normal appearance.   HENT:      Head: Normocephalic and atraumatic.      Nose: Congestion present.   Pulmonary:      Effort: Pulmonary effort is normal.      Breath sounds: Normal breath sounds.   Musculoskeletal:      Cervical back: Normal range of motion and neck supple.   Skin:     General: Skin is warm and dry.      Capillary Refill: Capillary refill takes less than 2 seconds.   Neurological:      General: No focal deficit present.      Mental Status: He is alert and oriented to person, place, and time.          Procedures           ED Course                                           Medical Decision Making  Negative flu, COVID, RSV, strep.  Likely viral upper respiratory infection.  Patient is to follow-up with his primary care as needed and was given strict return precautions.    Problems Addressed:  Upper respiratory tract infection, unspecified type: complicated acute illness or injury    Amount and/or Complexity of Data Reviewed  Labs: ordered.    Risk  Prescription drug management.        Final diagnoses:   Upper respiratory tract infection, unspecified type       ED Disposition  ED Disposition       ED Disposition   Discharge    Condition   Stable    Comment   --               Erik Benitez MD  [address] [id number] [phone]    Call   If symptoms worsen         Medication List        New Prescriptions      brompheniramine-pseudoephedrine-DM 30-2-10 MG/5ML syrup  Take 5 mL by mouth 4 (Four) Times a Day As Needed for Allergies.               Where to Get Your Medications        These medications were sent to [facility] [id number] - [place], [place] - [address] AT [facility] - [phone]  - [phone] [address] [address], [facility] [address]      Phone: [phone]   brompheniramine-pseudoephedrine-DM 30-2-10 MG/5ML syrup

## 2023-12-28 ENCOUNTER — OFFICE VISIT (OUTPATIENT)
Dept: FAMILY MEDICINE CLINIC | Facility: CLINIC | Age: 68
End: 2023-12-28
Payer: MEDICARE

## 2023-12-28 VITALS
HEART RATE: 67 BPM | TEMPERATURE: 97 F | HEIGHT: 74 IN | BODY MASS INDEX: 28.94 KG/M2 | RESPIRATION RATE: 16 BRPM | SYSTOLIC BLOOD PRESSURE: 152 MMHG | OXYGEN SATURATION: 98 % | WEIGHT: 225.5 LBS | DIASTOLIC BLOOD PRESSURE: 78 MMHG

## 2023-12-28 DIAGNOSIS — E66.3 OVERWEIGHT (BMI 25.0-29.9): ICD-10-CM

## 2023-12-28 DIAGNOSIS — G47.33 OBSTRUCTIVE SLEEP APNEA: ICD-10-CM

## 2023-12-28 DIAGNOSIS — Z00.00 ENCOUNTER FOR SUBSEQUENT ANNUAL WELLNESS VISIT IN MEDICARE PATIENT: Primary | ICD-10-CM

## 2023-12-28 DIAGNOSIS — I10 BENIGN ESSENTIAL HYPERTENSION: ICD-10-CM

## 2023-12-28 DIAGNOSIS — E78.00 HYPERCHOLESTEROLEMIA: ICD-10-CM

## 2023-12-28 RX ORDER — ROSUVASTATIN CALCIUM 10 MG/1
10 TABLET, COATED ORAL DAILY
Qty: 90 TABLET | Refills: 3 | Status: SHIPPED | OUTPATIENT
Start: 2023-12-28

## 2023-12-28 RX ORDER — AMLODIPINE BESYLATE 10 MG/1
10 TABLET ORAL DAILY
Qty: 90 TABLET | Refills: 1 | Status: SHIPPED | OUTPATIENT
Start: 2023-12-28

## 2023-12-28 NOTE — PROGRESS NOTES
The ABCs of the Annual Wellness Visit  Subsequent Medicare Wellness Visit    Subjective    Jacob Miller is a 68 y.o. male who presents for a Subsequent Medicare Wellness Visit.    The following portions of the patient's history were reviewed and   updated as appropriate: allergies, current medications, past family history, past medical history, past social history, past surgical history, and problem list.    Compared to one year ago, the patient feels his physical   health is better.    Compared to one year ago, the patient feels his mental   health is better.    Recent Hospitalizations:  This patient has had a [facility] admission record on file within the last 365 days.    Current Medical Providers:  Patient Care Team:  Erik Benitez MD as PCP - General    Outpatient Medications Prior to Visit   Medication Sig Dispense Refill    Alcohol Swabs pads Clean area of injection as needed 3 times daily. 100 each 3    diclofenac (VOLTAREN) 75 MG EC tablet TAKE 1 TABLET BY MOUTH DAILY 30 tablet 0    lisinopril-hydrochlorothiazide (PRINZIDE,ZESTORETIC) 20-12.5 MG per tablet TAKE TWO TABLETS BY MOUTH EVERY MORNING 60 tablet 2    metoprolol succinate XL (TOPROL-XL) 50 MG 24 hr tablet TAKE ONE TABLET BY MOUTH DAILY 90 tablet 3    OneTouch Delica Lancets 33G misc Check glucose 3 times daily 100 each 3    OneTouch Ultra test strip USE TO TEST FOR BLOOD SUGAR LEVELS ONCE DAILY AS DIRECTED 100 each 11    amLODIPine (NORVASC) 5 MG tablet TAKE TWO TABLETS BY MOUTH DAILY 90 tablet 3    simvastatin (ZOCOR) 40 MG tablet TAKE ONE TABLET BY MOUTH ONCE NIGHTLY 30 tablet 5    Diclofenac Sodium (Voltaren) 1 % gel gel APPLY 2 GRAMS TO THE AFFECTED AREA(S) BY TOPICAL ROUTE 4 TIMES PER DAY      Triprolidine-Pseudoephedrine (ANTIHISTAMINE PO) Take  by mouth. (Patient not taking: Reported on 8/24/2023)      brompheniramine-pseudoephedrine-DM 30-2-10 MG/5ML syrup Take 5 mL by mouth 4 (Four) Times a Day As Needed for Allergies.  "(Patient not taking: Reported on 12/28/2023) 120 mL 0    sildenafil (Viagra) 100 MG tablet Take 1 tablet by mouth Daily As Needed for Erectile Dysfunction. (Patient not taking: Reported on 8/24/2023) 30 tablet 1     No facility-administered medications prior to visit.       No opioid medication identified on active medication list. I have reviewed chart for other potential  high risk medication/s and harmful drug interactions in the elderly.        Aspirin is not on active medication list.  Aspirin use is not indicated based on review of current medical condition/s. Risk of harm outweighs potential benefits.  .    Patient Active Problem List   Diagnosis    Benign essential hypertension    Hypercholesterolemia    Obstructive sleep apnea    Hyperglycemia    Actinic keratosis    Benign non-nodular prostatic hyperplasia without lower urinary tract symptoms    Osteoarthritis of both knees    Palpitations    Biceps tendinitis on left    Overweight (BMI 25.0-29.9)    Overactive bladder    Other male erectile dysfunction    Encounter for subsequent annual wellness visit in Medicare patient    I will check    Elevated PSA     Advance Care Planning   Advance Care Planning     Advance Directive is not on file.  ACP discussion was held with the patient during this visit. Patient has an advance directive (not in EMR), copy requested.     Objective    Vitals:    12/28/23 1051   BP: 152/78   BP Location: Right arm   Patient Position: Sitting   Cuff Size: Adult   Pulse: 67   Resp: 16   Temp: 97 °F (36.1 °C)   TempSrc: Temporal   SpO2: 98%   Weight: 102 kg (225 lb 8 oz)   Height: 188 cm (74.02\")     Estimated body mass index is 28.94 kg/m² as calculated from the following:    Height as of this encounter: 188 cm (74.02\").    Weight as of this encounter: 102 kg (225 lb 8 oz).    BMI is >= 25 and <30. (Overweight) The following options were offered after discussion;: exercise counseling/recommendations and nutrition " counseling/recommendations      Does the patient have evidence of cognitive impairment?     Lab Results   Component Value Date    CHLPL 125 2023    TRIG 141 2023    HDL 28 (L) 2023    LDL 72 2023    VLDL 25 2023        HEALTH RISK ASSESSMENT    Smoking Status:  Social History     Tobacco Use   Smoking Status Never    Passive exposure: Never   Smokeless Tobacco Never   Tobacco Comments    Daily caffeine use     Alcohol Consumption:  Social History     Substance and Sexual Activity   Alcohol Use Yes    Comment: occ     Fall Risk Screen:    STEADI Fall Risk Assessment was completed, and patient is at LOW risk for falls.Assessment completed on:2023    Depression Screenin/28/2023    10:00 AM   PHQ-2/PHQ-9 Depression Screening   Little Interest or Pleasure in Doing Things 0-->not at all   Feeling Down, Depressed or Hopeless 0-->not at all   PHQ-9: Brief Depression Severity Measure Score 0       Health Habits and Functional and Cognitive Screenin/28/2023    10:00 AM   Functional & Cognitive Status   Do you have difficulty preparing food and eating? No   Do you have difficulty bathing yourself, getting dressed or grooming yourself? No   Do you have difficulty using the toilet? No   Do you have difficulty moving around from place to place? No   Do you have trouble with steps or getting out of a bed or a chair? No   Current Diet Well Balanced Diet   Dental Exam Up to date   Eye Exam Up to date   Exercise (times per week) 4 times per week   Current Exercises Include Walking   Do you need help using the phone?  No   Are you deaf or do you have serious difficulty hearing?  No   Do you need help to go to places out of walking distance? No   Do you need help shopping? No   Do you need help preparing meals?  No   Do you need help with housework?  No   Do you need help with laundry? No   Do you need help taking your medications? No   Do you need help managing money? No   Do you  ever drive or ride in a car without wearing a seat belt? No   Have you felt unusual stress, anger or loneliness in the last month? No   Who do you live with? Spouse   If you need help, do you have trouble finding someone available to you? No   Have you been bothered in the last four weeks by sexual problems? No   Do you have difficulty concentrating, remembering or making decisions? No       Age-appropriate Screening Schedule:  Refer to the list below for future screening recommendations based on patient's age, sex and/or medical conditions. Orders for these recommended tests are listed in the plan section. The patient has been provided with a written plan.    Health Maintenance   Topic Date Due    ANNUAL WELLNESS VISIT  05/24/2023    INFLUENZA VACCINE  08/01/2023    LIPID PANEL  12/26/2024    BMI FOLLOWUP  12/28/2024    COLORECTAL CANCER SCREENING  01/24/2032    TDAP/TD VACCINES (3 - Td or Tdap) 11/08/2033    HEPATITIS C SCREENING  Completed    COVID-19 Vaccine  Completed    Pneumococcal Vaccine 65+  Completed    ZOSTER VACCINE  Completed                  CMS Preventative Services Quick Reference  Risk Factors Identified During Encounter  None Identified  The above risks/problems have been discussed with the patient.  Pertinent information has been shared with the patient in the After Visit Summary.  An After Visit Summary and PPPS were made available to the patient.    Follow Up:   Next Medicare Wellness visit to be scheduled in 1 year.       Additional E&M Note during same encounter follows:  Patient has multiple medical problems which are significant and separately identifiable that require additional work above and beyond the Medicare Wellness Visit.      Chief Complaint  Medicare Wellness-subsequent    Subjective        He is here for a Medicare annual wellness visit.    He has no complaints.      Jacob Miller is also being seen today for htn, hypercholesterolemia    Review of Systems   Constitutional:  "Negative.    HENT: Negative.     Respiratory: Negative.     Cardiovascular: Negative.    Musculoskeletal: Negative.    Psychiatric/Behavioral: Negative.         Objective   Vital Signs:  /78 (BP Location: Right arm, Patient Position: Sitting, Cuff Size: Adult)   Pulse 67   Temp 97 °F (36.1 °C) (Temporal)   Resp 16   Ht 188 cm (74.02\")   Wt 102 kg (225 lb 8 oz)   SpO2 98%   BMI 28.94 kg/m²     Physical Exam  Vitals and nursing note reviewed.   Constitutional:       Appearance: Normal appearance.      Comments: Pleasant, neatly groomed, no distress.   Cardiovascular:      Rate and Rhythm: Regular rhythm.      Heart sounds: Normal heart sounds. No murmur heard.     No gallop.   Pulmonary:      Effort: No respiratory distress.      Breath sounds: Normal breath sounds. No wheezing or rales.   Neurological:      Mental Status: He is alert and oriented to person, place, and time.   Psychiatric:         Mood and Affect: Mood normal.         Behavior: Behavior normal.         Thought Content: Thought content normal.                         Assessment and Plan   Diagnoses and all orders for this visit:    1. Encounter for subsequent annual wellness visit in Medicare patient (Primary)    2. Obstructive sleep apnea    3. Overweight (BMI 25.0-29.9)    4. Benign essential hypertension    5. Hypercholesterolemia    Other orders  -     amLODIPine (NORVASC) 10 MG tablet; Take 1 tablet by mouth Daily.  Dispense: 90 tablet; Refill: 1  -     rosuvastatin (Crestor) 10 MG tablet; Take 1 tablet by mouth Daily.  Dispense: 90 tablet; Refill: 3      I would like to see better control of his hypertension.  I asked him to increase his atorvastatin to 10 mg up from 5 mg daily.    He has obstructive sleep apnea and is compliant with use of CPAP.    He is overweight and he has been getting regular aerobic activity and making big efforts to reduce his weight through dietary changes.    His hypercholesterolemia.  The fact that I am " increasing his amlodipine of asked him to stop taking simvastatin and instead to start taking rosuvastatin 10 mg daily.    I would like him to come back in about 3 months.  Fasting labs prior to that visit should include: Lipid profile, comprehensive metabolic panel, CBC, urinalysis.           Follow Up   No follow-ups on file.  Patient was given instructions and counseling regarding his condition or for health maintenance advice. Please see specific information pulled into the AVS if appropriate.

## 2024-01-05 RX ORDER — DICLOFENAC SODIUM 75 MG/1
TABLET, DELAYED RELEASE ORAL
Qty: 30 TABLET | Refills: 0 | Status: SHIPPED | OUTPATIENT
Start: 2024-01-05

## 2024-01-22 RX ORDER — LISINOPRIL AND HYDROCHLOROTHIAZIDE 20; 12.5 MG/1; MG/1
TABLET ORAL
Qty: 180 TABLET | Refills: 1 | Status: SHIPPED | OUTPATIENT
Start: 2024-01-22

## 2024-02-13 RX ORDER — DICLOFENAC SODIUM 75 MG/1
TABLET, DELAYED RELEASE ORAL
Qty: 30 TABLET | Refills: 0 | Status: SHIPPED | OUTPATIENT
Start: 2024-02-13

## 2024-02-15 ENCOUNTER — OFFICE VISIT (OUTPATIENT)
Dept: FAMILY MEDICINE CLINIC | Facility: CLINIC | Age: 69
End: 2024-02-15
Payer: MEDICARE

## 2024-02-15 VITALS
DIASTOLIC BLOOD PRESSURE: 72 MMHG | HEART RATE: 72 BPM | BODY MASS INDEX: 28.89 KG/M2 | OXYGEN SATURATION: 97 % | TEMPERATURE: 97.5 F | WEIGHT: 225.1 LBS | HEIGHT: 74 IN | SYSTOLIC BLOOD PRESSURE: 140 MMHG | RESPIRATION RATE: 16 BRPM

## 2024-02-15 DIAGNOSIS — J02.9 VIRAL PHARYNGITIS: Primary | ICD-10-CM

## 2024-02-15 PROCEDURE — 99213 OFFICE O/P EST LOW 20 MIN: CPT | Performed by: INTERNAL MEDICINE

## 2024-02-15 PROCEDURE — 3077F SYST BP >= 140 MM HG: CPT | Performed by: INTERNAL MEDICINE

## 2024-02-15 PROCEDURE — 3078F DIAST BP <80 MM HG: CPT | Performed by: INTERNAL MEDICINE

## 2024-02-15 RX ORDER — AMOXICILLIN AND CLAVULANATE POTASSIUM 875; 125 MG/1; MG/1
1 TABLET, FILM COATED ORAL 2 TIMES DAILY
Qty: 14 TABLET | Refills: 0 | Status: SHIPPED | OUTPATIENT
Start: 2024-02-15

## 2024-03-18 RX ORDER — DICLOFENAC SODIUM 75 MG/1
TABLET, DELAYED RELEASE ORAL
Qty: 30 TABLET | Refills: 0 | Status: SHIPPED | OUTPATIENT
Start: 2024-03-18

## 2024-04-18 ENCOUNTER — OFFICE VISIT (OUTPATIENT)
Dept: FAMILY MEDICINE CLINIC | Facility: CLINIC | Age: 69
End: 2024-04-18
Payer: MEDICARE

## 2024-04-18 VITALS
SYSTOLIC BLOOD PRESSURE: 138 MMHG | HEIGHT: 74 IN | DIASTOLIC BLOOD PRESSURE: 70 MMHG | RESPIRATION RATE: 16 BRPM | BODY MASS INDEX: 29.09 KG/M2 | TEMPERATURE: 96.7 F | WEIGHT: 226.7 LBS | OXYGEN SATURATION: 98 % | HEART RATE: 60 BPM

## 2024-04-18 DIAGNOSIS — E78.00 HYPERCHOLESTEROLEMIA: Primary | ICD-10-CM

## 2024-04-18 DIAGNOSIS — I10 BENIGN ESSENTIAL HYPERTENSION: ICD-10-CM

## 2024-04-18 DIAGNOSIS — E66.3 OVERWEIGHT (BMI 25.0-29.9): ICD-10-CM

## 2024-04-18 DIAGNOSIS — R73.9 HYPERGLYCEMIA: ICD-10-CM

## 2024-04-18 NOTE — PROGRESS NOTES
Subjective   Jacob Miller is a 68 y.o. male. Patient is here today for   Chief Complaint   Patient presents with    Hyperlipidemia          Vitals:    04/18/24 1030   BP: 138/70   Pulse: 60   Resp: 16   Temp: 96.7 °F (35.9 °C)   SpO2: 98%     Body mass index is 29.09 kg/m².      Past Medical History:   Diagnosis Date    Hyperlipidemia     Hypertension     Kidney stones     PAC (premature atrial contraction)     Palpitations     Sleep apnea     USES C-PAP MACHINE      No Known Allergies   Social History     Socioeconomic History    Marital status:    Tobacco Use    Smoking status: Never     Passive exposure: Never    Smokeless tobacco: Never    Tobacco comments:     Daily caffeine use   Vaping Use    Vaping status: Never Used   Substance and Sexual Activity    Alcohol use: Yes     Comment: occ    Drug use: No    Sexual activity: Defer        Current Outpatient Medications:     Alcohol Swabs pads, Clean area of injection as needed 3 times daily., Disp: 100 each, Rfl: 3    amLODIPine (NORVASC) 10 MG tablet, Take 1 tablet by mouth Daily., Disp: 90 tablet, Rfl: 1    diclofenac (VOLTAREN) 75 MG EC tablet, TAKE 1 TABLET BY MOUTH DAILY, Disp: 30 tablet, Rfl: 0    lisinopril-hydrochlorothiazide (PRINZIDE,ZESTORETIC) 20-12.5 MG per tablet, TAKE 2 TABLETS BY MOUTH EVERY MORNING, Disp: 180 tablet, Rfl: 1    metoprolol succinate XL (TOPROL-XL) 50 MG 24 hr tablet, TAKE ONE TABLET BY MOUTH DAILY, Disp: 90 tablet, Rfl: 3    OneTouch Delica Lancets 33G misc, Check glucose 3 times daily, Disp: 100 each, Rfl: 3    OneTouch Ultra test strip, USE TO TEST FOR BLOOD SUGAR LEVELS ONCE DAILY AS DIRECTED, Disp: 100 each, Rfl: 11    rosuvastatin (Crestor) 10 MG tablet, Take 1 tablet by mouth Daily., Disp: 90 tablet, Rfl: 3     Objective     History of Present Illness  He is here today to follow-up on hypercholesterolemia.    He is feeling pretty well overall.    He also got other labs last week which we are to review  today.  Hyperlipidemia         Review of Systems   Constitutional: Negative.    HENT: Negative.     Respiratory: Negative.     Cardiovascular: Negative.    Musculoskeletal: Negative.    Psychiatric/Behavioral: Negative.         Physical Exam  Vitals and nursing note reviewed.   Constitutional:       Appearance: Normal appearance.      Comments: Pleasant, neatly groomed, no distress.  BMI 29.   Cardiovascular:      Rate and Rhythm: Regular rhythm.      Heart sounds: Normal heart sounds. No murmur heard.     No gallop.   Pulmonary:      Effort: No respiratory distress.      Breath sounds: Normal breath sounds. No wheezing or rales.   Neurological:      Mental Status: He is alert and oriented to person, place, and time.   Psychiatric:         Mood and Affect: Mood normal.         Behavior: Behavior normal.         Thought Content: Thought content normal.           Problems Addressed this Visit          Cardiac and Vasculature    Benign essential hypertension    Hypercholesterolemia - Primary       Endocrine and Metabolic    Hyperglycemia    Overweight (BMI 25.0-29.9)     Diagnoses         Codes Comments    Hypercholesterolemia    -  Primary ICD-10-CM: E78.00  ICD-9-CM: 272.0     Benign essential hypertension     ICD-10-CM: I10  ICD-9-CM: 401.1     Overweight (BMI 25.0-29.9)     ICD-10-CM: E66.3  ICD-9-CM: 278.02     Hyperglycemia     ICD-10-CM: R73.9  ICD-9-CM: 790.29               PLAN  He and I reviewed his labs.    His hypercholesterolemia is well-controlled.  When I checked his blood pressure in his right arm as he was seated I got 130/70.    His hypercholesterolemia is well-controlled on rosuvastatin 10 mg daily.    He has hyperglycemia and he is a bit overweight.  Encouraged him to do his best to decrease his weight by 15 to 20 pounds and get regular aerobic activity 30 minutes a day 5 days a week per American Heart Association guidelines.    I asked him to follow-up in about 6 months.  Fasting labs prior to that  visit should include: Lipid profile, comprehensive metabolic panel, CBC, urinalysis, hemoglobin A1c.  No follow-ups on file.

## 2024-04-22 RX ORDER — DICLOFENAC SODIUM 75 MG/1
TABLET, DELAYED RELEASE ORAL
Qty: 30 TABLET | Refills: 2 | Status: SHIPPED | OUTPATIENT
Start: 2024-04-22

## 2024-04-22 RX ORDER — METOPROLOL SUCCINATE 50 MG/1
TABLET, EXTENDED RELEASE ORAL
Qty: 90 TABLET | Refills: 1 | Status: SHIPPED | OUTPATIENT
Start: 2024-04-22

## 2024-07-25 RX ORDER — DICLOFENAC SODIUM 75 MG/1
TABLET, DELAYED RELEASE ORAL
Qty: 30 TABLET | Refills: 1 | Status: SHIPPED | OUTPATIENT
Start: 2024-07-25

## 2024-08-22 RX ORDER — AMLODIPINE BESYLATE 10 MG/1
10 TABLET ORAL DAILY
Qty: 90 TABLET | Refills: 0 | Status: SHIPPED | OUTPATIENT
Start: 2024-08-22

## 2024-08-30 RX ORDER — LISINOPRIL AND HYDROCHLOROTHIAZIDE 12.5; 2 MG/1; MG/1
TABLET ORAL
Qty: 180 TABLET | Refills: 1 | Status: SHIPPED | OUTPATIENT
Start: 2024-08-30

## 2024-09-27 RX ORDER — DICLOFENAC SODIUM 75 MG/1
TABLET, DELAYED RELEASE ORAL
Qty: 30 TABLET | Refills: 1 | Status: SHIPPED | OUTPATIENT
Start: 2024-09-27

## 2024-10-17 ENCOUNTER — LAB (OUTPATIENT)
Dept: FAMILY MEDICINE CLINIC | Facility: CLINIC | Age: 69
End: 2024-10-17
Payer: MEDICARE

## 2024-10-17 DIAGNOSIS — Z12.5 SCREENING FOR PROSTATE CANCER: ICD-10-CM

## 2024-10-17 DIAGNOSIS — R73.9 HYPERGLYCEMIA: ICD-10-CM

## 2024-10-17 DIAGNOSIS — E78.00 HYPERCHOLESTEROLEMIA: Primary | ICD-10-CM

## 2024-10-18 LAB
ALBUMIN SERPL-MCNC: 4.6 G/DL (ref 3.5–5.2)
ALBUMIN/GLOB SERPL: 1.9 G/DL
ALP SERPL-CCNC: 87 U/L (ref 39–117)
ALT SERPL-CCNC: 36 U/L (ref 1–41)
APPEARANCE UR: ABNORMAL
AST SERPL-CCNC: 26 U/L (ref 1–40)
BACTERIA #/AREA URNS HPF: NORMAL /HPF
BASOPHILS # BLD AUTO: 0.08 10*3/MM3 (ref 0–0.2)
BASOPHILS NFR BLD AUTO: 1.3 % (ref 0–1.5)
BILIRUB SERPL-MCNC: 0.3 MG/DL (ref 0–1.2)
BILIRUB UR QL STRIP: NEGATIVE
BUN SERPL-MCNC: 16 MG/DL (ref 8–23)
BUN/CREAT SERPL: 16.8 (ref 7–25)
CALCIUM SERPL-MCNC: 9.5 MG/DL (ref 8.6–10.5)
CASTS URNS MICRO: NORMAL
CHLORIDE SERPL-SCNC: 102 MMOL/L (ref 98–107)
CHOLEST SERPL-MCNC: 123 MG/DL (ref 0–200)
CO2 SERPL-SCNC: 32.2 MMOL/L (ref 22–29)
COLOR UR: YELLOW
CREAT SERPL-MCNC: 0.95 MG/DL (ref 0.76–1.27)
EGFRCR SERPLBLD CKD-EPI 2021: 85.6 ML/MIN/1.73
EOSINOPHIL # BLD AUTO: 0.19 10*3/MM3 (ref 0–0.4)
EOSINOPHIL NFR BLD AUTO: 3 % (ref 0.3–6.2)
EPI CELLS #/AREA URNS HPF: NORMAL /HPF
ERYTHROCYTE [DISTWIDTH] IN BLOOD BY AUTOMATED COUNT: 14.7 % (ref 12.3–15.4)
GLOBULIN SER CALC-MCNC: 2.4 GM/DL
GLUCOSE SERPL-MCNC: 132 MG/DL (ref 65–99)
GLUCOSE UR QL STRIP: NEGATIVE
HBA1C MFR BLD: 5.8 % (ref 4.8–5.6)
HCT VFR BLD AUTO: 44.8 % (ref 37.5–51)
HDLC SERPL-MCNC: 26 MG/DL (ref 40–60)
HGB BLD-MCNC: 14.5 G/DL (ref 13–17.7)
HGB UR QL STRIP: NEGATIVE
IMM GRANULOCYTES # BLD AUTO: 0.07 10*3/MM3 (ref 0–0.05)
IMM GRANULOCYTES NFR BLD AUTO: 1.1 % (ref 0–0.5)
KETONES UR QL STRIP: NEGATIVE
LDLC SERPL CALC-MCNC: 64 MG/DL (ref 0–100)
LDLC/HDLC SERPL: 2.19 {RATIO}
LEUKOCYTE ESTERASE UR QL STRIP: ABNORMAL
LYMPHOCYTES # BLD AUTO: 1.36 10*3/MM3 (ref 0.7–3.1)
LYMPHOCYTES NFR BLD AUTO: 21.7 % (ref 19.6–45.3)
MCH RBC QN AUTO: 28.1 PG (ref 26.6–33)
MCHC RBC AUTO-ENTMCNC: 32.4 G/DL (ref 31.5–35.7)
MCV RBC AUTO: 86.8 FL (ref 79–97)
MONOCYTES # BLD AUTO: 1.14 10*3/MM3 (ref 0.1–0.9)
MONOCYTES NFR BLD AUTO: 18.2 % (ref 5–12)
NEUTROPHILS # BLD AUTO: 3.42 10*3/MM3 (ref 1.7–7)
NEUTROPHILS NFR BLD AUTO: 54.7 % (ref 42.7–76)
NITRITE UR QL STRIP: NEGATIVE
NRBC BLD AUTO-RTO: 0 /100 WBC (ref 0–0.2)
PH UR STRIP: 7.5 [PH] (ref 5–8)
PLATELET # BLD AUTO: 211 10*3/MM3 (ref 140–450)
POTASSIUM SERPL-SCNC: 3.8 MMOL/L (ref 3.5–5.2)
PROT SERPL-MCNC: 7 G/DL (ref 6–8.5)
PROT UR QL STRIP: ABNORMAL
PSA SERPL-MCNC: 2.45 NG/ML (ref 0–4)
RBC # BLD AUTO: 5.16 10*6/MM3 (ref 4.14–5.8)
RBC #/AREA URNS HPF: NORMAL /HPF
SODIUM SERPL-SCNC: 143 MMOL/L (ref 136–145)
SP GR UR STRIP: 1.02 (ref 1–1.03)
TRIGL SERPL-MCNC: 200 MG/DL (ref 0–150)
UROBILINOGEN UR STRIP-MCNC: ABNORMAL MG/DL
VLDLC SERPL CALC-MCNC: 33 MG/DL (ref 5–40)
WBC # BLD AUTO: 6.26 10*3/MM3 (ref 3.4–10.8)
WBC #/AREA URNS HPF: NORMAL /HPF

## 2024-10-23 ENCOUNTER — OFFICE VISIT (OUTPATIENT)
Dept: FAMILY MEDICINE CLINIC | Facility: CLINIC | Age: 69
End: 2024-10-23
Payer: MEDICARE

## 2024-10-23 VITALS
BODY MASS INDEX: 29.26 KG/M2 | RESPIRATION RATE: 16 BRPM | HEIGHT: 74 IN | HEART RATE: 58 BPM | DIASTOLIC BLOOD PRESSURE: 62 MMHG | SYSTOLIC BLOOD PRESSURE: 130 MMHG | OXYGEN SATURATION: 98 % | WEIGHT: 228 LBS

## 2024-10-23 DIAGNOSIS — Z23 FLU VACCINE NEED: ICD-10-CM

## 2024-10-23 DIAGNOSIS — E66.3 OVERWEIGHT (BMI 25.0-29.9): ICD-10-CM

## 2024-10-23 DIAGNOSIS — R73.9 HYPERGLYCEMIA: ICD-10-CM

## 2024-10-23 DIAGNOSIS — I10 BENIGN ESSENTIAL HYPERTENSION: ICD-10-CM

## 2024-10-23 DIAGNOSIS — G47.33 OBSTRUCTIVE SLEEP APNEA: ICD-10-CM

## 2024-10-23 DIAGNOSIS — E78.00 HYPERCHOLESTEROLEMIA: Primary | ICD-10-CM

## 2024-10-23 PROCEDURE — 1126F AMNT PAIN NOTED NONE PRSNT: CPT | Performed by: INTERNAL MEDICINE

## 2024-10-23 PROCEDURE — 99213 OFFICE O/P EST LOW 20 MIN: CPT | Performed by: INTERNAL MEDICINE

## 2024-10-23 PROCEDURE — 3078F DIAST BP <80 MM HG: CPT | Performed by: INTERNAL MEDICINE

## 2024-10-23 PROCEDURE — 3075F SYST BP GE 130 - 139MM HG: CPT | Performed by: INTERNAL MEDICINE

## 2024-10-23 PROCEDURE — 90662 IIV NO PRSV INCREASED AG IM: CPT | Performed by: INTERNAL MEDICINE

## 2024-10-23 PROCEDURE — G0008 ADMIN INFLUENZA VIRUS VAC: HCPCS | Performed by: INTERNAL MEDICINE

## 2024-10-23 RX ORDER — METOPROLOL SUCCINATE 50 MG/1
50 TABLET, EXTENDED RELEASE ORAL DAILY
Qty: 90 TABLET | Refills: 1 | Status: SHIPPED | OUTPATIENT
Start: 2024-10-23

## 2024-10-23 NOTE — PROGRESS NOTES
Subjective   Jacob Miller is a 69 y.o. male. Patient is here today for   Chief Complaint   Patient presents with    Hyperlipidemia    Follow-up        History of Present Illness  Here today for a lab follow-up.    He has no complaints.    He has obstructive sleep apnea and is compliant with use of CPAP.    History of Present Illness  The patient presents for a routine checkup.    He is generally in good health but anticipates a potential need for a prostate procedure, which will be determined during his upcoming visit with Dr. Saez on 10/29/2024. He has been under the care of Dr. Rogelio Goyal for an extended period.    His blood pressure readings are within the normal range. His blood sugar levels are slightly elevated, with a reading of 118 during his last visit, compared to previous readings of 129 and 124. He does not regularly monitor his blood sugar levels at home. His weight has remained stable.    He continues to use his CPAP mask for sleep apnea. He maintains an active lifestyle, walking approximately 2 miles with his wife 2 to 3 times per week.    SOCIAL HISTORY  He is retired now. He was a  for [employer] for 15 years.      Vitals:    10/23/24 0932   BP: 130/62   Pulse: 58   Resp: 16   SpO2: 98%     Body mass index is 29.26 kg/m².    Past Medical History:   Diagnosis Date    Hyperlipidemia     Hypertension     Kidney stones     PAC (premature atrial contraction)     Palpitations     Sleep apnea     USES C-PAP MACHINE      No Known Allergies   Social History     Socioeconomic History    Marital status:    Tobacco Use    Smoking status: Never     Passive exposure: Never    Smokeless tobacco: Never    Tobacco comments:     Daily caffeine use   Vaping Use    Vaping status: Never Used   Substance and Sexual Activity    Alcohol use: Yes     Comment: occ    Drug use: No    Sexual activity: Defer        Current Outpatient Medications:     Alcohol Swabs pads, Clean area of  injection as needed 3 times daily., Disp: 100 each, Rfl: 3    amLODIPine (NORVASC) 10 MG tablet, TAKE 1 TABLET BY MOUTH DAILY, Disp: 90 tablet, Rfl: 0    diclofenac (VOLTAREN) 75 MG EC tablet, TAKE 1 TABLET BY MOUTH DAILY, Disp: 30 tablet, Rfl: 1    lisinopril-hydrochlorothiazide (PRINZIDE,ZESTORETIC) 20-12.5 MG per tablet, TAKE 2 TABLETS BY MOUTH EVERY MORNING, Disp: 180 tablet, Rfl: 1    metoprolol succinate XL (TOPROL-XL) 50 MG 24 hr tablet, TAKE 1 TABLET BY MOUTH DAILY, Disp: 90 tablet, Rfl: 1    OneTouch Delica Lancets 33G misc, Check glucose 3 times daily, Disp: 100 each, Rfl: 3    OneTouch Ultra test strip, USE TO TEST FOR BLOOD SUGAR LEVELS ONCE DAILY AS DIRECTED, Disp: 100 each, Rfl: 11    rosuvastatin (Crestor) 10 MG tablet, Take 1 tablet by mouth Daily., Disp: 90 tablet, Rfl: 3     Objective     Review of Systems   Constitutional: Negative.    HENT: Negative.     Respiratory: Negative.     Cardiovascular: Negative.    Musculoskeletal: Negative.    Psychiatric/Behavioral: Negative.         Physical Exam  Vitals and nursing note reviewed.   Constitutional:       General: He is not in acute distress.     Appearance: Normal appearance. He is not ill-appearing, toxic-appearing or diaphoretic.      Comments: Pleasant, neatly groomed, distress.   Neck:      Vascular: No carotid bruit.   Cardiovascular:      Rate and Rhythm: Regular rhythm.      Heart sounds: Normal heart sounds. No murmur heard.     No gallop.   Pulmonary:      Effort: No respiratory distress.      Breath sounds: Normal breath sounds. No wheezing or rales.   Neurological:      Mental Status: He is alert and oriented to person, place, and time.   Psychiatric:         Mood and Affect: Mood normal.         Behavior: Behavior normal.         Thought Content: Thought content normal.       Physical Exam  Clear lungs.    Results  Laboratory Studies  Blood sugar was 118. Hemoglobin A1c was 5.8%.    Assessment & Plan    Problems Addressed this Visit           Cardiac and Vasculature    Benign essential hypertension    Hypercholesterolemia - Primary       Endocrine and Metabolic    Hyperglycemia    Overweight (BMI 25.0-29.9)       Sleep    Obstructive sleep apnea     Other Visit Diagnoses       Flu vaccine need        Relevant Orders    Fluzone High-Dose 65+yrs (0998-1491) (Completed)          Diagnoses         Codes Comments    Hypercholesterolemia    -  Primary ICD-10-CM: E78.00  ICD-9-CM: 272.0     Benign essential hypertension     ICD-10-CM: I10  ICD-9-CM: 401.1     Overweight (BMI 25.0-29.9)     ICD-10-CM: E66.3  ICD-9-CM: 278.02     Obstructive sleep apnea     ICD-10-CM: G47.33  ICD-9-CM: 327.23     Flu vaccine need     ICD-10-CM: Z23  ICD-9-CM: V04.81     Hyperglycemia     ICD-10-CM: R73.9  ICD-9-CM: 790.29           Assessment & Plan  1. Benign Prostatic Hyperplasia (BPH) without urinary obstruction.  He is scheduled for a transrectal ultrasound (TRUS) procedure to address his prostate issue. He will follow up with Dr. Saez on 10/29/2024 to discuss the plan.    2. Elevated blood glucose.  His fasting glucose levels have been slightly elevated, with recent readings of 118, 129, and 124. Hemoglobin A1c is 5.8%, an improvement from 6% in April but still above the normal range of less than 5.7%. Current blood sugar control is considered good. No changes to his medication regimen are necessary at this time. He is not required to check his blood sugars regularly.    3. Health Maintenance.  An influenza vaccine will be administered today. He is advised to continue regular aerobic activity, such as walking 2-3 times a week.    4.  Hypertension with good control.    5.  He has obstructive sleep apnea is compliant with use of CPAP.    6.  His hypercholesterolemia is well-controlled.    7.  I asked him to follow-up for a visit in April next year.  Fasting labs prior to that visit should include: Hemoglobin A1c, comprehensive metabolic panel, CBC without  differential, urinalysis, lipid profile.  Follow-up  Return in 6 months for fasting labs.      No follow-ups on file.    Patient or patient representative verbalized consent for the use of Ambient Listening during the visit with  Erik Benitez MD for chart documentation. 10/25/2024  11:35 EDT

## 2024-12-02 ENCOUNTER — OFFICE VISIT (OUTPATIENT)
Dept: FAMILY MEDICINE CLINIC | Facility: CLINIC | Age: 69
End: 2024-12-02
Payer: MEDICARE

## 2024-12-02 VITALS
HEART RATE: 70 BPM | TEMPERATURE: 97.5 F | RESPIRATION RATE: 16 BRPM | DIASTOLIC BLOOD PRESSURE: 64 MMHG | OXYGEN SATURATION: 98 % | HEIGHT: 74 IN | BODY MASS INDEX: 29.39 KG/M2 | SYSTOLIC BLOOD PRESSURE: 136 MMHG | WEIGHT: 229 LBS

## 2024-12-02 DIAGNOSIS — R05.9 COUGH, UNSPECIFIED TYPE: ICD-10-CM

## 2024-12-02 DIAGNOSIS — R09.89 CHEST CONGESTION: ICD-10-CM

## 2024-12-02 DIAGNOSIS — J06.9 ACUTE URI: ICD-10-CM

## 2024-12-02 DIAGNOSIS — J02.9 SORE THROAT: Primary | ICD-10-CM

## 2024-12-02 LAB
EXPIRATION DATE: NORMAL
FLUAV RNA RESP QL NAA+PROBE: NOT DETECTED
FLUBV RNA RESP QL NAA+PROBE: NOT DETECTED
INTERNAL CONTROL: NORMAL
Lab: NORMAL
S PYO AG THROAT QL: NEGATIVE
SARS-COV-2 AG UPPER RESP QL IA.RAPID: NOT DETECTED

## 2024-12-02 PROCEDURE — 87880 STREP A ASSAY W/OPTIC: CPT | Performed by: INTERNAL MEDICINE

## 2024-12-02 PROCEDURE — 3075F SYST BP GE 130 - 139MM HG: CPT | Performed by: INTERNAL MEDICINE

## 2024-12-02 PROCEDURE — 87426 SARSCOV CORONAVIRUS AG IA: CPT | Performed by: INTERNAL MEDICINE

## 2024-12-02 PROCEDURE — 1126F AMNT PAIN NOTED NONE PRSNT: CPT | Performed by: INTERNAL MEDICINE

## 2024-12-02 PROCEDURE — 3078F DIAST BP <80 MM HG: CPT | Performed by: INTERNAL MEDICINE

## 2024-12-02 PROCEDURE — 99213 OFFICE O/P EST LOW 20 MIN: CPT | Performed by: INTERNAL MEDICINE

## 2024-12-02 PROCEDURE — 87502 INFLUENZA DNA AMP PROBE: CPT | Performed by: INTERNAL MEDICINE

## 2024-12-02 RX ORDER — ALFUZOSIN HYDROCHLORIDE 10 MG/1
TABLET, EXTENDED RELEASE ORAL
COMMUNITY
Start: 2024-10-29

## 2024-12-02 RX ORDER — DICLOFENAC SODIUM 75 MG/1
TABLET, DELAYED RELEASE ORAL
Qty: 30 TABLET | Refills: 1 | Status: SHIPPED | OUTPATIENT
Start: 2024-12-02

## 2024-12-02 RX ORDER — AMLODIPINE BESYLATE 10 MG/1
10 TABLET ORAL DAILY
Qty: 90 TABLET | Refills: 0 | Status: SHIPPED | OUTPATIENT
Start: 2024-12-02

## 2024-12-02 NOTE — PROGRESS NOTES
"Chief Complaint  Cough, Nasal Congestion, Sore Throat, and Headache    Subjective        Jacob Miller presents to [facility]  History of Present Illness  Patient presented to the clinic today with complaints of cough, sinus congestion and nasal drainage, headache and sore throat since Saturday.  Denies any history of sick contact.  Intermittent cough, sometimes productive but mostly dry.  Sore throat has improved and cough has subsided a bit.  Took some loratadine for congestion and Tylenol for headache.  POCT COVID and flu in the office was negative.  POCT strep A was negative.  Cough  Associated symptoms include headaches and a sore throat.   Sore Throat   Associated symptoms include coughing and headaches.   Headache      Objective   Vital Signs:  /64 (BP Location: Right arm, Patient Position: Sitting, Cuff Size: Adult)   Pulse 70   Temp 97.5 °F (36.4 °C) (Temporal)   Resp 16   Ht 188 cm (74.02\")   Wt 104 kg (229 lb)   SpO2 98%   BMI 29.39 kg/m²   Estimated body mass index is 29.39 kg/m² as calculated from the following:    Height as of this encounter: 188 cm (74.02\").    Weight as of this encounter: 104 kg (229 lb).            Physical Exam  Constitutional:       Appearance: Normal appearance.   HENT:      Head: Normocephalic and atraumatic.      Nose: Congestion and rhinorrhea present.   Cardiovascular:      Heart sounds: Normal heart sounds.   Pulmonary:      Effort: No respiratory distress.      Breath sounds: Normal breath sounds. No wheezing.   Neurological:      Mental Status: He is alert.        Result Review :  The following data was reviewed by: Madison Barahona MD on 12/02/2024:  Influenza A&B          12/2/2024    11:26   Common Labs   Rapid Influenza A Ag Not Detected    Rapid Influenza B Ag Not Detected      Covid Tests          12/10/2023    12:13   Common Labs   COVID19 Not Detected      Strep          12/2/2024    11:24   Common Labs   POC Strep A, " Molecular Negative                Assessment and Plan   Diagnoses and all orders for this visit:    1. Sore throat (Primary)  -     POCT SARS-CoV-2 Antigen HUAN  -     POCT Flu A&B, Molecular  -     POCT rapid strep A    2. Chest congestion  -     POCT SARS-CoV-2 Antigen HUAN  -     POCT Flu A&B, Molecular  -     POCT rapid strep A    3. Cough, unspecified type  -     POCT SARS-CoV-2 Antigen HUAN  -     POCT Flu A&B, Molecular  -     POCT rapid strep A    4. Acute URI    Likely viral in origin which is self-limiting.  Counseled patient to continue with OTC medications for symptomatic relief.  Advised on Cytotec or loratadine for congestion, to continue with Tylenol as needed for headache and OTC DayQuil/NyQuil as needed.  RTC if symptoms persist for over 2 weeks.         Follow Up   No follow-ups on file.  Patient was given instructions and counseling regarding his condition or for health maintenance advice. Please see specific information pulled into the AVS if appropriate.

## 2024-12-12 ENCOUNTER — OFFICE VISIT (OUTPATIENT)
Dept: FAMILY MEDICINE CLINIC | Facility: CLINIC | Age: 69
End: 2024-12-12
Payer: MEDICARE

## 2024-12-12 VITALS
DIASTOLIC BLOOD PRESSURE: 62 MMHG | WEIGHT: 229.8 LBS | HEIGHT: 74 IN | OXYGEN SATURATION: 100 % | SYSTOLIC BLOOD PRESSURE: 124 MMHG | TEMPERATURE: 98.6 F | HEART RATE: 63 BPM | RESPIRATION RATE: 16 BRPM | BODY MASS INDEX: 29.49 KG/M2

## 2024-12-12 DIAGNOSIS — J06.9 ACUTE URI: Primary | ICD-10-CM

## 2024-12-12 PROCEDURE — 99213 OFFICE O/P EST LOW 20 MIN: CPT | Performed by: STUDENT IN AN ORGANIZED HEALTH CARE EDUCATION/TRAINING PROGRAM

## 2024-12-12 PROCEDURE — 3078F DIAST BP <80 MM HG: CPT | Performed by: STUDENT IN AN ORGANIZED HEALTH CARE EDUCATION/TRAINING PROGRAM

## 2024-12-12 PROCEDURE — 1125F AMNT PAIN NOTED PAIN PRSNT: CPT | Performed by: STUDENT IN AN ORGANIZED HEALTH CARE EDUCATION/TRAINING PROGRAM

## 2024-12-12 PROCEDURE — 3074F SYST BP LT 130 MM HG: CPT | Performed by: STUDENT IN AN ORGANIZED HEALTH CARE EDUCATION/TRAINING PROGRAM

## 2024-12-12 NOTE — PROGRESS NOTES
Office Note     Name: Jacob Miller    : 1955     MRN: [id number]     Chief Complaint  Cough and Conjunctivitis    Subjective     History of Present Illness:  Jacob Miller is a 69 y.o. male     History of Present Illness  The patient is a 69-year-old male who presents to the clinic today with complaints of cough, nasal congestion, and sore throat, which have been present for the past week. He was seen for the same complaints 10 days ago. At that time, he had testing for influenza, COVID-19, and strep, all of which were negative. His symptoms were attributed to an upper respiratory infection, likely viral in origin. He was counseled that this would likely be a self-limiting process and was advised to continue over-the-counter medications such as loratadine for congestion, Tylenol as needed for headache, and over-the-counter DayQuil/NyQuil. He presents today because his symptoms have not improved.    He reports persistent symptoms of coughing up mucus and head congestion, despite the use of over-the-counter medications such as DayQuil, NyQuil, and antihistamines. He has not utilized any nasal sprays. He describes experiencing significant nasal drainage, necessitating the use of multiple boxes of Kleenex. The nasal discharge is predominantly clear, however, the expectorated sputum contains greenish material. He reports no shortness of breath. His sleep has been disrupted due to these symptoms over the past few nights.    Supplemental Information  He did start a rash on his back several days ago. He went to a dermatologist for another appointment yesterday afternoon and he gave him a cream to put on it.    MEDICATIONS  Current: DayQuil, NyQuil, loratadine, Tylenol       Past Medical History:   Past Medical History:   Diagnosis Date    Hyperlipidemia     Hypertension     Kidney stones     PAC (premature atrial contraction)     Palpitations     Sleep apnea     USES C-PAP MACHINE       Past Surgical  History:   Past Surgical History:   Procedure Laterality Date    BELPHAROPTOSIS REPAIR Bilateral     HERNIA REPAIR      ROTATOR CUFF REPAIR         Immunizations:   Immunization History   Administered Date(s) Administered    ABRYSVO (RSV, 60+ or pregnant women 32-36 wks) 01/18/2024    COVID-19 (PFIZER) 12YRS+ (COMIRNATY) 12/06/2023, 09/04/2024    COVID-19 (PFIZER) BIVALENT 12+YRS 10/22/2022    COVID-19 (PFIZER) Purple Cap Monovalent 02/27/2021, 03/20/2021, 10/30/2021    Covid-19 (Pfizer) Gray Cap Monovalent 05/21/2022    FLUAD TRI 65YR+ 11/08/2019    FluMist 2-49yrs 11/13/2017    Fluad Quad 65+ 10/14/2020, 11/03/2022    Fluzone High-Dose 65+YRS 10/18/2018, 10/23/2024    Fluzone High-Dose 65+yrs 11/02/2021    Hepatitis A 06/25/2018, 12/28/2018    INFLUENZA SPLIT TRI 11/13/2018    Influenza, Unspecified 11/13/2018, 01/01/2022    Pneumococcal Conjugate 20-Valent (PCV20) 11/08/2023    Pneumococcal Polysaccharide (PPSV23) 10/22/2020    Shingrix 01/08/2019, 05/04/2019    Tdap 06/24/2016, 11/08/2023    Zostavax 12/22/2016        Medications:     Current Outpatient Medications:     Alcohol Swabs pads, Clean area of injection as needed 3 times daily., Disp: 100 each, Rfl: 3    alfuzosin (UROXATRAL) 10 MG 24 hr tablet, , Disp: , Rfl:     amLODIPine (NORVASC) 10 MG tablet, TAKE 1 TABLET BY MOUTH DAILY, Disp: 90 tablet, Rfl: 0    diclofenac (VOLTAREN) 75 MG EC tablet, TAKE 1 TABLET BY MOUTH DAILY, Disp: 30 tablet, Rfl: 1    lisinopril-hydrochlorothiazide (PRINZIDE,ZESTORETIC) 20-12.5 MG per tablet, TAKE 2 TABLETS BY MOUTH EVERY MORNING, Disp: 180 tablet, Rfl: 1    metoprolol succinate XL (TOPROL-XL) 50 MG 24 hr tablet, TAKE 1 TABLET BY MOUTH DAILY, Disp: 90 tablet, Rfl: 1    OneTouch Delica Lancets 33G misc, Check glucose 3 times daily, Disp: 100 each, Rfl: 3    OneTouch Ultra test strip, USE TO TEST FOR BLOOD SUGAR LEVELS ONCE DAILY AS DIRECTED, Disp: 100 each, Rfl: 11    rosuvastatin (Crestor) 10 MG tablet, Take 1 tablet by  "mouth Daily., Disp: 90 tablet, Rfl: 3    Allergies:   No Known Allergies    Family History:   Family History   Problem Relation Age of Onset    Heart disease Mother     Hypertension Mother     Diabetes Mother     Heart disease Father     Arrhythmia Brother        Social History:   Social History     Socioeconomic History    Marital status:    Tobacco Use    Smoking status: Never     Passive exposure: Never    Smokeless tobacco: Never    Tobacco comments:     Daily caffeine use   Vaping Use    Vaping status: Never Used   Substance and Sexual Activity    Alcohol use: Yes     Comment: occ    Drug use: No    Sexual activity: Defer         Objective     Vital Signs  /62 (BP Location: Left arm, Patient Position: Sitting, Cuff Size: Adult)   Pulse 63   Temp 98.6 °F (37 °C) (Temporal)   Resp 16   Ht 188 cm (74.02\")   Wt 104 kg (229 lb 12.8 oz)   SpO2 100%   BMI 29.49 kg/m²   Estimated body mass index is 29.49 kg/m² as calculated from the following:    Height as of this encounter: 188 cm (74.02\").    Weight as of this encounter: 104 kg (229 lb 12.8 oz).            Physical Exam  Constitutional:       General: He is not in acute distress.  HENT:      Head: Normocephalic and atraumatic.      Nose: Rhinorrhea present. No nasal tenderness.      Mouth/Throat:      Pharynx: Uvula midline. Posterior oropharyngeal erythema present. No oropharyngeal exudate.   Cardiovascular:      Rate and Rhythm: Normal rate and regular rhythm.   Pulmonary:      Effort: Pulmonary effort is normal. No respiratory distress.   Musculoskeletal:      Right lower leg: No edema.      Left lower leg: No edema.   Skin:     Findings: No rash.   Neurological:      General: No focal deficit present.      Mental Status: He is alert and oriented to person, place, and time.   Psychiatric:         Mood and Affect: Mood normal.         Behavior: Behavior normal.         Thought Content: Thought content normal.         Judgment: Judgment normal. "          Assessment and Plan     Assessment & Plan  1. Viral upper respiratory tract infection.  His symptoms, including cough, nasal congestion, and sore throat, are consistent with a viral upper respiratory tract infection. There is no evidence of a bacterial infection at this time. Lung examination revealed clear sounds, indicating no signs of pneumonia. He is advised to use Flonase nasal spray once daily before bedtime. If symptoms persist, the dosage can be increased to twice daily. An over-the-counter sinus rinse should be used prior to the nasal spray to enhance its effectiveness. He can continue using Benadryl and Tylenol as needed for symptom relief. If symptoms do not improve after 3 weeks, an empirical antibiotic treatment may be considered.         Follow Up  No follow-ups on file.            Reinaldo Og MD   [facility]  [address] [place] [id number] [phone]    Patient or patient representative verbalized consent for the use of Ambient Listening during the visit with  Reinaldo Og MD for chart documentation. 12/12/2024  12:08 EST

## 2024-12-20 ENCOUNTER — TRANSCRIBE ORDERS (OUTPATIENT)
Dept: LAB | Facility: HOSPITAL | Age: 69
End: 2024-12-20
Payer: MEDICARE

## 2024-12-20 ENCOUNTER — HOSPITAL ENCOUNTER (OUTPATIENT)
Dept: CARDIOLOGY | Facility: HOSPITAL | Age: 69
Discharge: HOME | End: 2024-12-20
Payer: MEDICARE

## 2024-12-20 ENCOUNTER — TRANSCRIBE ORDERS (OUTPATIENT)
Dept: CARDIOLOGY | Facility: HOSPITAL | Age: 69
End: 2024-12-20
Payer: MEDICARE

## 2024-12-20 ENCOUNTER — LAB (OUTPATIENT)
Dept: LAB | Facility: HOSPITAL | Age: 69
End: 2024-12-20
Payer: MEDICARE

## 2024-12-20 DIAGNOSIS — R79.1 ABNORMAL COAGULATION PROFILE: ICD-10-CM

## 2024-12-20 DIAGNOSIS — N40.0 ENLARGED PROSTATE: Primary | ICD-10-CM

## 2024-12-20 DIAGNOSIS — Z01.811 PRE-OP CHEST EXAM: Primary | ICD-10-CM

## 2024-12-20 DIAGNOSIS — N40.0 ENLARGED PROSTATE: ICD-10-CM

## 2024-12-20 LAB
ANION GAP SERPL CALCULATED.3IONS-SCNC: 11.4 MMOL/L (ref 5–15)
BASOPHILS # BLD AUTO: 0.06 10*3/MM3 (ref 0–0.2)
BASOPHILS NFR BLD AUTO: 0.9 % (ref 0–1.5)
BUN SERPL-MCNC: 16 MG/DL (ref 8–23)
BUN/CREAT SERPL: 15.7 (ref 7–25)
CALCIUM SPEC-SCNC: 10.2 MG/DL (ref 8.6–10.5)
CHLORIDE SERPL-SCNC: 97 MMOL/L (ref 98–107)
CO2 SERPL-SCNC: 30.6 MMOL/L (ref 22–29)
CREAT SERPL-MCNC: 1.02 MG/DL (ref 0.76–1.27)
DEPRECATED RDW RBC AUTO: 48.1 FL (ref 37–54)
EGFRCR SERPLBLD CKD-EPI 2021: 78.6 ML/MIN/1.73
EOSINOPHIL # BLD AUTO: 0.13 10*3/MM3 (ref 0–0.4)
EOSINOPHIL NFR BLD AUTO: 1.9 % (ref 0.3–6.2)
ERYTHROCYTE [DISTWIDTH] IN BLOOD BY AUTOMATED COUNT: 15.5 % (ref 12.3–15.4)
GLUCOSE SERPL-MCNC: 165 MG/DL (ref 65–99)
HCT VFR BLD AUTO: 44.6 % (ref 37.5–51)
HGB BLD-MCNC: 14.4 G/DL (ref 13–17.7)
IMM GRANULOCYTES # BLD AUTO: 0.12 10*3/MM3 (ref 0–0.05)
IMM GRANULOCYTES NFR BLD AUTO: 1.7 % (ref 0–0.5)
LYMPHOCYTES # BLD AUTO: 1.48 10*3/MM3 (ref 0.7–3.1)
LYMPHOCYTES NFR BLD AUTO: 21.2 % (ref 19.6–45.3)
MCH RBC QN AUTO: 27.5 PG (ref 26.6–33)
MCHC RBC AUTO-ENTMCNC: 32.3 G/DL (ref 31.5–35.7)
MCV RBC AUTO: 85.1 FL (ref 79–97)
MONOCYTES # BLD AUTO: 1.14 10*3/MM3 (ref 0.1–0.9)
MONOCYTES NFR BLD AUTO: 16.4 % (ref 5–12)
NEUTROPHILS NFR BLD AUTO: 4.04 10*3/MM3 (ref 1.7–7)
NEUTROPHILS NFR BLD AUTO: 57.9 % (ref 42.7–76)
NRBC BLD AUTO-RTO: 0 /100 WBC (ref 0–0.2)
PLATELET # BLD AUTO: 230 10*3/MM3 (ref 140–450)
PMV BLD AUTO: 10.5 FL (ref 6–12)
POTASSIUM SERPL-SCNC: 3.3 MMOL/L (ref 3.5–5.2)
QT INTERVAL: 375 MS
QTC INTERVAL: 469 MS
RBC # BLD AUTO: 5.24 10*6/MM3 (ref 4.14–5.8)
SODIUM SERPL-SCNC: 139 MMOL/L (ref 136–145)
WBC NRBC COR # BLD AUTO: 6.97 10*3/MM3 (ref 3.4–10.8)

## 2024-12-20 PROCEDURE — 80048 BASIC METABOLIC PNL TOTAL CA: CPT

## 2024-12-20 PROCEDURE — 36415 COLL VENOUS BLD VENIPUNCTURE: CPT

## 2024-12-20 PROCEDURE — 93005 ELECTROCARDIOGRAM TRACING: CPT

## 2024-12-20 PROCEDURE — 85025 COMPLETE CBC W/AUTO DIFF WBC: CPT

## 2024-12-26 ENCOUNTER — OFFICE VISIT (OUTPATIENT)
Dept: FAMILY MEDICINE CLINIC | Facility: CLINIC | Age: 69
End: 2024-12-26
Payer: MEDICARE

## 2024-12-26 VITALS
RESPIRATION RATE: 16 BRPM | DIASTOLIC BLOOD PRESSURE: 60 MMHG | SYSTOLIC BLOOD PRESSURE: 140 MMHG | HEART RATE: 74 BPM | TEMPERATURE: 96.2 F | OXYGEN SATURATION: 98 % | BODY MASS INDEX: 29.88 KG/M2 | HEIGHT: 74 IN | WEIGHT: 232.8 LBS

## 2024-12-26 DIAGNOSIS — R01.1 HEART MURMUR ON PHYSICAL EXAMINATION: ICD-10-CM

## 2024-12-26 DIAGNOSIS — R94.31 ABNORMAL EKG: ICD-10-CM

## 2024-12-26 DIAGNOSIS — Z01.810 PREOPERATIVE CARDIOVASCULAR EXAMINATION: Primary | ICD-10-CM

## 2024-12-26 PROCEDURE — 1160F RVW MEDS BY RX/DR IN RCRD: CPT | Performed by: STUDENT IN AN ORGANIZED HEALTH CARE EDUCATION/TRAINING PROGRAM

## 2024-12-26 PROCEDURE — 1159F MED LIST DOCD IN RCRD: CPT | Performed by: STUDENT IN AN ORGANIZED HEALTH CARE EDUCATION/TRAINING PROGRAM

## 2024-12-26 PROCEDURE — 3077F SYST BP >= 140 MM HG: CPT | Performed by: STUDENT IN AN ORGANIZED HEALTH CARE EDUCATION/TRAINING PROGRAM

## 2024-12-26 PROCEDURE — 1126F AMNT PAIN NOTED NONE PRSNT: CPT | Performed by: STUDENT IN AN ORGANIZED HEALTH CARE EDUCATION/TRAINING PROGRAM

## 2024-12-26 PROCEDURE — 3078F DIAST BP <80 MM HG: CPT | Performed by: STUDENT IN AN ORGANIZED HEALTH CARE EDUCATION/TRAINING PROGRAM

## 2024-12-26 PROCEDURE — 99214 OFFICE O/P EST MOD 30 MIN: CPT | Performed by: STUDENT IN AN ORGANIZED HEALTH CARE EDUCATION/TRAINING PROGRAM

## 2024-12-26 NOTE — PROGRESS NOTES
Office Note     Name: Jacob Miller    : 1955     MRN: [id number]     Chief Complaint  Results    Subjective     History of Present Illness:  Jacob Miller is a 69 y.o. male     History of Present Illness  The patient is a 69-year-old male with obstructive sleep apnea, benign essential hypertension, and hypercholesterolemia, who presents today to discuss recent EKG results.    He is scheduled for prostate surgery on 2025 and has undergone preoperative testing, including an EKG that was reported as abnormal. He was advised to consult his primary care physician, Dr. Benitez, but due to unavailability until mid-2025, he sought immediate medical attention. He suspects an inadvertent double dose of lisinopril on the day of the EKG, which he believes may have contributed to an elevated heart rate of 94, a significant increase from his usual rate of approximately 60. He reports feeling unwell on that day. He has not undergone any cardiac imaging such as echocardiogram. He reports no recent chest pain or shortness of breath but mentions occasional palpitations, a symptom he has experienced for several years. His last cardiology consultation was approximately 5 to 7 years ago. He has never been informed of having a heart murmur.    Supplemental Information  He has a history of sleep apnea and uses a CPAP machine.    MEDICATIONS  Current: lisinopril       Past Medical History:   Past Medical History:   Diagnosis Date    Hyperlipidemia     Hypertension     Kidney stones     PAC (premature atrial contraction)     Palpitations     Sleep apnea     USES C-PAP MACHINE       Past Surgical History:   Past Surgical History:   Procedure Laterality Date    BELPHAROPTOSIS REPAIR Bilateral     HERNIA REPAIR      ROTATOR CUFF REPAIR         Immunizations:   Immunization History   Administered Date(s) Administered    ABRYSVO (RSV, 60+ or pregnant women 32-36 wks) 2024    COVID-19 (PFIZER) 12YRS+  (COMIRNATY) 12/06/2023, 09/04/2024    COVID-19 (PFIZER) BIVALENT 12+YRS 10/22/2022    COVID-19 (PFIZER) Purple Cap Monovalent 02/27/2021, 03/20/2021, 10/30/2021    Covid-19 (Pfizer) Gray Cap Monovalent 05/21/2022    FLUAD TRI 65YR+ 11/08/2019    FluMist 2-49yrs 11/13/2017    Fluad Quad 65+ 10/14/2020, 11/03/2022    Fluzone High-Dose 65+YRS 10/18/2018, 10/23/2024    Fluzone High-Dose 65+yrs 11/02/2021    Hepatitis A 06/25/2018, 12/28/2018    INFLUENZA SPLIT TRI 11/13/2018    Influenza, Unspecified 11/13/2018, 01/01/2022    Pneumococcal Conjugate 20-Valent (PCV20) 11/08/2023    Pneumococcal Polysaccharide (PPSV23) 10/22/2020    Shingrix 01/08/2019, 05/04/2019    Tdap 06/24/2016, 11/08/2023    Zostavax 12/22/2016        Medications:     Current Outpatient Medications:     Alcohol Swabs pads, Clean area of injection as needed 3 times daily., Disp: 100 each, Rfl: 3    alfuzosin (UROXATRAL) 10 MG 24 hr tablet, , Disp: , Rfl:     amLODIPine (NORVASC) 10 MG tablet, TAKE 1 TABLET BY MOUTH DAILY, Disp: 90 tablet, Rfl: 0    diclofenac (VOLTAREN) 75 MG EC tablet, TAKE 1 TABLET BY MOUTH DAILY, Disp: 30 tablet, Rfl: 1    lisinopril-hydrochlorothiazide (PRINZIDE,ZESTORETIC) 20-12.5 MG per tablet, TAKE 2 TABLETS BY MOUTH EVERY MORNING, Disp: 180 tablet, Rfl: 1    metoprolol succinate XL (TOPROL-XL) 50 MG 24 hr tablet, TAKE 1 TABLET BY MOUTH DAILY, Disp: 90 tablet, Rfl: 1    OneTouch Delica Lancets 33G misc, Check glucose 3 times daily, Disp: 100 each, Rfl: 3    OneTouch Ultra test strip, USE TO TEST FOR BLOOD SUGAR LEVELS ONCE DAILY AS DIRECTED, Disp: 100 each, Rfl: 11    rosuvastatin (Crestor) 10 MG tablet, Take 1 tablet by mouth Daily., Disp: 90 tablet, Rfl: 3    Allergies:   No Known Allergies    Family History:   Family History   Problem Relation Age of Onset    Heart disease Mother     Hypertension Mother     Diabetes Mother     Heart disease Father     Arrhythmia Brother        Social History:   Social History  "    Socioeconomic History    Marital status:    Tobacco Use    Smoking status: Never     Passive exposure: Never    Smokeless tobacco: Never    Tobacco comments:     Daily caffeine use   Vaping Use    Vaping status: Never Used   Substance and Sexual Activity    Alcohol use: Yes     Comment: occ    Drug use: No    Sexual activity: Defer         Objective     Vital Signs  /60 (BP Location: Left arm, Patient Position: Sitting, Cuff Size: Adult)   Pulse 74   Temp 96.2 °F (35.7 °C) (Temporal)   Resp 16   Ht 188 cm (74.02\")   Wt 106 kg (232 lb 12.8 oz)   SpO2 98%   BMI 29.88 kg/m²   Estimated body mass index is 29.88 kg/m² as calculated from the following:    Height as of this encounter: 188 cm (74.02\").    Weight as of this encounter: 106 kg (232 lb 12.8 oz).            Physical Exam  Constitutional:       General: He is not in acute distress.  HENT:      Head: Normocephalic and atraumatic.   Cardiovascular:      Rate and Rhythm: Normal rate and regular rhythm.      Heart sounds: Murmur heard.      No friction rub. No gallop.   Pulmonary:      Effort: Pulmonary effort is normal. No respiratory distress.   Musculoskeletal:      Right lower leg: No edema.      Left lower leg: No edema.   Skin:     Findings: No rash.   Neurological:      General: No focal deficit present.      Mental Status: He is alert and oriented to person, place, and time.   Psychiatric:         Mood and Affect: Mood normal.         Behavior: Behavior normal.         Thought Content: Thought content normal.         Judgment: Judgment normal.          Assessment and Plan     Assessment & Plan  1. Abnormal EKG.  The EKG results do not indicate any significant concerns. His heart rate, although elevated compared to previous readings, does not reach the threshold for tachycardia. The EKG reveals an RSR pattern, suggestive of an incomplete right bundle branch block, a common finding that is not inherently hazardous. There are no signs of " ischemia or inadequate blood flow to the cardiac tissue. His QRS interval is within normal limits. A mild tricuspid regurgitation was noted on a previous echocardiogram. An echocardiogram will be ordered to further evaluate the detected heart murmur and to rule out any structural abnormalities. He will be notified of the results upon receipt.     2. Hypokalemia.  His potassium levels are slightly below the normal range. He is advised to increase his dietary intake of potassium to help elevate his potassium levels.    3. Hyperglycemia.  His glucose levels are marginally elevated, likely due to non-fasting status during the test. No immediate intervention is required, but monitoring and dietary adjustments are recommended if necessary.         Follow Up  No follow-ups on file.            Reinaldo Og MD   [facility]  [address] [place] [id number] [phone]    Patient or patient representative verbalized consent for the use of Ambient Listening during the visit with  Reinaldo Og MD for chart documentation. 12/26/2024  10:58 EST

## 2024-12-30 ENCOUNTER — HOSPITAL ENCOUNTER (OUTPATIENT)
Dept: CARDIOLOGY | Facility: HOSPITAL | Age: 69
Discharge: HOME | End: 2024-12-30
Admitting: STUDENT IN AN ORGANIZED HEALTH CARE EDUCATION/TRAINING PROGRAM
Payer: MEDICARE

## 2024-12-30 ENCOUNTER — TELEPHONE (OUTPATIENT)
Dept: FAMILY MEDICINE CLINIC | Facility: CLINIC | Age: 69
End: 2024-12-30
Payer: MEDICARE

## 2024-12-30 VITALS
HEIGHT: 74 IN | BODY MASS INDEX: 29.77 KG/M2 | WEIGHT: 232 LBS | SYSTOLIC BLOOD PRESSURE: 158 MMHG | HEART RATE: 70 BPM | DIASTOLIC BLOOD PRESSURE: 68 MMHG

## 2024-12-30 DIAGNOSIS — R01.1 HEART MURMUR ON PHYSICAL EXAMINATION: ICD-10-CM

## 2024-12-30 DIAGNOSIS — Z01.810 PREOPERATIVE CARDIOVASCULAR EXAMINATION: ICD-10-CM

## 2024-12-30 DIAGNOSIS — R94.31 ABNORMAL EKG: ICD-10-CM

## 2024-12-30 LAB
AORTIC ARCH: 2.5 CM
AORTIC DIMENSIONLESS INDEX: 0.72 (DI)
ASCENDING AORTA: 3.3 CM
AV MEAN PRESS GRAD SYS DOP V1V2: 5 MMHG
AV VMAX SYS DOP: 159 CM/SEC
BH CV ECHO LEFT VENTRICLE GLOBAL LONGITUDINAL STRAIN: -24.6 %
BH CV ECHO MEAS - ACS: 2.29 CM
BH CV ECHO MEAS - AO MAX PG: 10.1 MMHG
BH CV ECHO MEAS - AO ROOT AREA (BSA CORRECTED): 1.4 CM2
BH CV ECHO MEAS - AO ROOT DIAM: 3.2 CM
BH CV ECHO MEAS - AO V2 VTI: 35 CM
BH CV ECHO MEAS - AVA(I,D): 3 CM2
BH CV ECHO MEAS - EDV(CUBED): 132.7 ML
BH CV ECHO MEAS - EDV(MOD-SP2): 121 ML
BH CV ECHO MEAS - EDV(MOD-SP4): 154 ML
BH CV ECHO MEAS - EF(MOD-SP2): 69.4 %
BH CV ECHO MEAS - EF(MOD-SP4): 64.3 %
BH CV ECHO MEAS - ESV(CUBED): 33.1 ML
BH CV ECHO MEAS - ESV(MOD-SP2): 37 ML
BH CV ECHO MEAS - ESV(MOD-SP4): 55 ML
BH CV ECHO MEAS - FS: 37.1 %
BH CV ECHO MEAS - IVS/LVPW: 1.1 CM
BH CV ECHO MEAS - IVSD: 1.1 CM
BH CV ECHO MEAS - LA 3D VOL INDEX: 26
BH CV ECHO MEAS - LAT PEAK E' VEL: 12.2 CM/SEC
BH CV ECHO MEAS - LV DIASTOLIC VOL/BSA (35-75): 66.5 CM2
BH CV ECHO MEAS - LV MASS(C)D: 200.8 GRAMS
BH CV ECHO MEAS - LV MAX PG: 5.8 MMHG
BH CV ECHO MEAS - LV MEAN PG: 3 MMHG
BH CV ECHO MEAS - LV SYSTOLIC VOL/BSA (12-30): 23.8 CM2
BH CV ECHO MEAS - LV V1 MAX: 120 CM/SEC
BH CV ECHO MEAS - LV V1 VTI: 24.8 CM
BH CV ECHO MEAS - LVIDD: 5.1 CM
BH CV ECHO MEAS - LVIDS: 3.2 CM
BH CV ECHO MEAS - LVOT AREA: 4.2 CM2
BH CV ECHO MEAS - LVOT DIAM: 2.32 CM
BH CV ECHO MEAS - LVPWD: 1 CM
BH CV ECHO MEAS - MED PEAK E' VEL: 11.1 CM/SEC
BH CV ECHO MEAS - MV A DUR: 0.15 SEC
BH CV ECHO MEAS - MV A MAX VEL: 93.3 CM/SEC
BH CV ECHO MEAS - MV DEC SLOPE: 398.6 CM/SEC2
BH CV ECHO MEAS - MV DEC TIME: 0.2 SEC
BH CV ECHO MEAS - MV E MAX VEL: 71.3 CM/SEC
BH CV ECHO MEAS - MV E/A: 0.76
BH CV ECHO MEAS - MV MAX PG: 3.6 MMHG
BH CV ECHO MEAS - MV MEAN PG: 1.7 MMHG
BH CV ECHO MEAS - MV P1/2T: 63.4 MSEC
BH CV ECHO MEAS - MV V2 VTI: 23.3 CM
BH CV ECHO MEAS - MVA(P1/2T): 3.5 CM2
BH CV ECHO MEAS - MVA(VTI): 4.5 CM2
BH CV ECHO MEAS - PA ACC TIME: 0.13 SEC
BH CV ECHO MEAS - PA V2 MAX: 128.3 CM/SEC
BH CV ECHO MEAS - PULM A REVS DUR: 0.15 SEC
BH CV ECHO MEAS - PULM A REVS VEL: 33.1 CM/SEC
BH CV ECHO MEAS - PULM DIAS VEL: 66.6 CM/SEC
BH CV ECHO MEAS - PULM S/D: 0.93
BH CV ECHO MEAS - PULM SYS VEL: 62.2 CM/SEC
BH CV ECHO MEAS - QP/QS: 0.93
BH CV ECHO MEAS - RAP SYSTOLE: 3 MMHG
BH CV ECHO MEAS - RV MAX PG: 2.6 MMHG
BH CV ECHO MEAS - RV V1 MAX: 80.2 CM/SEC
BH CV ECHO MEAS - RV V1 VTI: 19.3 CM
BH CV ECHO MEAS - RVOT DIAM: 2.5 CM
BH CV ECHO MEAS - RVSP: 34.9 MMHG
BH CV ECHO MEAS - SUP REN AO DIAM: 2.4 CM
BH CV ECHO MEAS - SV(LVOT): 105 ML
BH CV ECHO MEAS - SV(MOD-SP2): 84 ML
BH CV ECHO MEAS - SV(MOD-SP4): 99 ML
BH CV ECHO MEAS - SV(RVOT): 98 ML
BH CV ECHO MEAS - SVI(LVOT): 45.4 ML/M2
BH CV ECHO MEAS - SVI(MOD-SP2): 36.3 ML/M2
BH CV ECHO MEAS - SVI(MOD-SP4): 42.8 ML/M2
BH CV ECHO MEAS - TAPSE (>1.6): 3.5 CM
BH CV ECHO MEAS - TR MAX PG: 31.9 MMHG
BH CV ECHO MEAS - TR MAX VEL: 282.3 CM/SEC
BH CV ECHO MEASUREMENTS AVERAGE E/E' RATIO: 6.12
BH CV XLRA - RV BASE: 4 CM
BH CV XLRA - RV LENGTH: 8.6 CM
BH CV XLRA - RV MID: 3.5 CM
BH CV XLRA - TDI S': 17.8 CM/SEC
LEFT ATRIUM VOLUME INDEX: 28.4 ML/M2
LV EF 3D SEGMENTATION: 59 %
LV EF BIPLANE MOD: 66.7 %
SINUS: 3.3 CM
STJ: 2.3 CM

## 2024-12-30 PROCEDURE — 93356 MYOCRD STRAIN IMG SPCKL TRCK: CPT

## 2024-12-30 PROCEDURE — 93306 TTE W/DOPPLER COMPLETE: CPT

## 2024-12-30 NOTE — TELEPHONE ENCOUNTER
Called [phone] spoke with April to [facility] pt stat Echo provider Dr Zaragoza Cell phone number for results  
anxiety/lack of motivation/acuteness of illness/nonacceptance/nonalliance

## 2025-01-08 PROCEDURE — 88305 TISSUE EXAM BY PATHOLOGIST: CPT | Performed by: UROLOGY

## 2025-01-09 ENCOUNTER — LAB REQUISITION (OUTPATIENT)
Dept: LAB | Facility: HOSPITAL | Age: 70
End: 2025-01-09
Payer: MEDICARE

## 2025-01-09 DIAGNOSIS — N40.0 BENIGN PROSTATIC HYPERPLASIA WITHOUT LOWER URINARY TRACT SYMPTOMS: ICD-10-CM

## 2025-01-10 ENCOUNTER — HOSPITAL ENCOUNTER (OUTPATIENT)
Facility: HOSPITAL | Age: 70
Setting detail: OBSERVATION
Discharge: HOME | End: 2025-01-11
Attending: EMERGENCY MEDICINE | Admitting: EMERGENCY MEDICINE
Payer: MEDICARE

## 2025-01-10 ENCOUNTER — APPOINTMENT (OUTPATIENT)
Dept: GENERAL RADIOLOGY | Facility: HOSPITAL | Age: 70
End: 2025-01-10
Payer: MEDICARE

## 2025-01-10 DIAGNOSIS — R55 SYNCOPE AND COLLAPSE: Primary | ICD-10-CM

## 2025-01-10 DIAGNOSIS — D72.829 LEUKOCYTOSIS, UNSPECIFIED TYPE: ICD-10-CM

## 2025-01-10 DIAGNOSIS — N28.9 ACUTE RENAL INSUFFICIENCY: ICD-10-CM

## 2025-01-10 LAB
ALBUMIN SERPL-MCNC: 3.9 G/DL (ref 3.5–5.2)
ALBUMIN/GLOB SERPL: 1.4 G/DL
ALP SERPL-CCNC: 78 U/L (ref 39–117)
ALT SERPL W P-5'-P-CCNC: 17 U/L (ref 1–41)
ANION GAP SERPL CALCULATED.3IONS-SCNC: 10.8 MMOL/L (ref 5–15)
APTT PPP: 29.5 SECONDS (ref 22.7–35.4)
AST SERPL-CCNC: 16 U/L (ref 1–40)
BACTERIA UR QL AUTO: ABNORMAL /HPF
BASOPHILS # BLD MANUAL: 0 10*3/MM3 (ref 0–0.2)
BASOPHILS NFR BLD MANUAL: 0 % (ref 0–1.5)
BILIRUB SERPL-MCNC: 0.6 MG/DL (ref 0–1.2)
BILIRUB UR QL STRIP: NEGATIVE
BUN SERPL-MCNC: 19 MG/DL (ref 8–23)
BUN/CREAT SERPL: 13.9 (ref 7–25)
CALCIUM SPEC-SCNC: 8.8 MG/DL (ref 8.6–10.5)
CHLORIDE SERPL-SCNC: 100 MMOL/L (ref 98–107)
CLARITY UR: CLEAR
CO2 SERPL-SCNC: 27.2 MMOL/L (ref 22–29)
COLOR UR: ABNORMAL
CREAT SERPL-MCNC: 1.37 MG/DL (ref 0.76–1.27)
D-LACTATE SERPL-SCNC: 1.2 MMOL/L (ref 0.5–2)
DEPRECATED RDW RBC AUTO: 48.5 FL (ref 37–54)
EGFRCR SERPLBLD CKD-EPI 2021: 55.1 ML/MIN/1.73
EOSINOPHIL # BLD MANUAL: 0.15 10*3/MM3 (ref 0–0.4)
EOSINOPHIL NFR BLD MANUAL: 1 % (ref 0.3–6.2)
ERYTHROCYTE [DISTWIDTH] IN BLOOD BY AUTOMATED COUNT: 15.8 % (ref 12.3–15.4)
GEN 5 1HR TROPONIN T REFLEX: 13 NG/L
GLOBULIN UR ELPH-MCNC: 2.7 GM/DL
GLUCOSE SERPL-MCNC: 152 MG/DL (ref 65–99)
GLUCOSE UR STRIP-MCNC: NEGATIVE MG/DL
HCT VFR BLD AUTO: 43.1 % (ref 37.5–51)
HGB BLD-MCNC: 13.6 G/DL (ref 13–17.7)
HGB UR QL STRIP.AUTO: ABNORMAL
HYALINE CASTS UR QL AUTO: ABNORMAL /LPF
INR PPP: 1.1 (ref 0.9–1.1)
KETONES UR QL STRIP: NEGATIVE
LAB AP CASE REPORT: NORMAL
LEUKOCYTE ESTERASE UR QL STRIP.AUTO: ABNORMAL
LYMPHOCYTES # BLD MANUAL: 1.47 10*3/MM3 (ref 0.7–3.1)
LYMPHOCYTES NFR BLD MANUAL: 19 % (ref 5–12)
MCH RBC QN AUTO: 26.7 PG (ref 26.6–33)
MCHC RBC AUTO-ENTMCNC: 31.6 G/DL (ref 31.5–35.7)
MCV RBC AUTO: 84.5 FL (ref 79–97)
MONOCYTES # BLD: 2.8 10*3/MM3 (ref 0.1–0.9)
NEUTROPHILS # BLD AUTO: 10.31 10*3/MM3 (ref 1.7–7)
NEUTROPHILS NFR BLD MANUAL: 70 % (ref 42.7–76)
NITRITE UR QL STRIP: POSITIVE
NRBC BLD AUTO-RTO: 0 /100 WBC (ref 0–0.2)
PATH REPORT.FINAL DX SPEC: NORMAL
PATH REPORT.GROSS SPEC: NORMAL
PH UR STRIP.AUTO: 5.5 [PH] (ref 5–8)
PLAT MORPH BLD: NORMAL
PLATELET # BLD AUTO: 211 10*3/MM3 (ref 140–450)
PMV BLD AUTO: 11 FL (ref 6–12)
POTASSIUM SERPL-SCNC: 3.5 MMOL/L (ref 3.5–5.2)
PROT SERPL-MCNC: 6.6 G/DL (ref 6–8.5)
PROT UR QL STRIP: ABNORMAL
PROTHROMBIN TIME: 14.5 SECONDS (ref 11.7–14.2)
QT INTERVAL: 377 MS
QTC INTERVAL: 401 MS
RBC # BLD AUTO: 5.1 10*6/MM3 (ref 4.14–5.8)
RBC # UR STRIP: ABNORMAL /HPF
RBC MORPH BLD: NORMAL
REF LAB TEST METHOD: ABNORMAL
SODIUM SERPL-SCNC: 138 MMOL/L (ref 136–145)
SP GR UR STRIP: <=1.005 (ref 1–1.03)
SQUAMOUS #/AREA URNS HPF: ABNORMAL /HPF
TROPONIN T NUMERIC DELTA: -2 NG/L
TROPONIN T SERPL HS-MCNC: 15 NG/L
UROBILINOGEN UR QL STRIP: ABNORMAL
VARIANT LYMPHS NFR BLD MANUAL: 10 % (ref 19.6–45.3)
WBC # UR STRIP: ABNORMAL /HPF
WBC MORPH BLD: NORMAL
WBC NRBC COR # BLD AUTO: 14.73 10*3/MM3 (ref 3.4–10.8)

## 2025-01-10 PROCEDURE — 85025 COMPLETE CBC W/AUTO DIFF WBC: CPT | Performed by: EMERGENCY MEDICINE

## 2025-01-10 PROCEDURE — 71045 X-RAY EXAM CHEST 1 VIEW: CPT

## 2025-01-10 PROCEDURE — 36415 COLL VENOUS BLD VENIPUNCTURE: CPT

## 2025-01-10 PROCEDURE — 81001 URINALYSIS AUTO W/SCOPE: CPT | Performed by: EMERGENCY MEDICINE

## 2025-01-10 PROCEDURE — 84484 ASSAY OF TROPONIN QUANT: CPT | Performed by: EMERGENCY MEDICINE

## 2025-01-10 PROCEDURE — G0378 HOSPITAL OBSERVATION PER HR: HCPCS

## 2025-01-10 PROCEDURE — 99285 EMERGENCY DEPT VISIT HI MDM: CPT

## 2025-01-10 PROCEDURE — 85730 THROMBOPLASTIN TIME PARTIAL: CPT | Performed by: EMERGENCY MEDICINE

## 2025-01-10 PROCEDURE — 93010 ELECTROCARDIOGRAM REPORT: CPT | Performed by: INTERNAL MEDICINE

## 2025-01-10 PROCEDURE — 85007 BL SMEAR W/DIFF WBC COUNT: CPT | Performed by: EMERGENCY MEDICINE

## 2025-01-10 PROCEDURE — 80053 COMPREHEN METABOLIC PANEL: CPT | Performed by: EMERGENCY MEDICINE

## 2025-01-10 PROCEDURE — 25810000003 SODIUM CHLORIDE 0.9 % SOLUTION: Performed by: EMERGENCY MEDICINE

## 2025-01-10 PROCEDURE — 85610 PROTHROMBIN TIME: CPT | Performed by: EMERGENCY MEDICINE

## 2025-01-10 PROCEDURE — 83605 ASSAY OF LACTIC ACID: CPT | Performed by: EMERGENCY MEDICINE

## 2025-01-10 PROCEDURE — 93005 ELECTROCARDIOGRAM TRACING: CPT | Performed by: EMERGENCY MEDICINE

## 2025-01-10 RX ORDER — OXYCODONE AND ACETAMINOPHEN 7.5; 325 MG/1; MG/1
1 TABLET ORAL EVERY 8 HOURS PRN
COMMUNITY

## 2025-01-10 RX ORDER — AMOXICILLIN 250 MG
2 CAPSULE ORAL 2 TIMES DAILY PRN
Status: DISCONTINUED | OUTPATIENT
Start: 2025-01-10 | End: 2025-01-11 | Stop reason: HOSPADM

## 2025-01-10 RX ORDER — AMLODIPINE BESYLATE 10 MG/1
10 TABLET ORAL DAILY
Status: DISCONTINUED | OUTPATIENT
Start: 2025-01-10 | End: 2025-01-11 | Stop reason: HOSPADM

## 2025-01-10 RX ORDER — SODIUM CHLORIDE 9 MG/ML
40 INJECTION, SOLUTION INTRAVENOUS AS NEEDED
Status: DISCONTINUED | OUTPATIENT
Start: 2025-01-10 | End: 2025-01-11 | Stop reason: HOSPADM

## 2025-01-10 RX ORDER — ACETAMINOPHEN 160 MG/5ML
650 SOLUTION ORAL EVERY 4 HOURS PRN
Status: DISCONTINUED | OUTPATIENT
Start: 2025-01-10 | End: 2025-01-11 | Stop reason: HOSPADM

## 2025-01-10 RX ORDER — ONDANSETRON 4 MG/1
4 TABLET, ORALLY DISINTEGRATING ORAL EVERY 8 HOURS PRN
COMMUNITY

## 2025-01-10 RX ORDER — OXYCODONE AND ACETAMINOPHEN 7.5; 325 MG/1; MG/1
1 TABLET ORAL EVERY 8 HOURS PRN
Status: DISCONTINUED | OUTPATIENT
Start: 2025-01-10 | End: 2025-01-11 | Stop reason: HOSPADM

## 2025-01-10 RX ORDER — METOPROLOL SUCCINATE 50 MG/1
50 TABLET, EXTENDED RELEASE ORAL DAILY
Status: DISCONTINUED | OUTPATIENT
Start: 2025-01-10 | End: 2025-01-11 | Stop reason: HOSPADM

## 2025-01-10 RX ORDER — LISINOPRIL 20 MG/1
20 TABLET ORAL
Status: DISCONTINUED | OUTPATIENT
Start: 2025-01-10 | End: 2025-01-11 | Stop reason: HOSPADM

## 2025-01-10 RX ORDER — CEPHALEXIN 500 MG/1
500 CAPSULE ORAL 3 TIMES DAILY
Status: DISCONTINUED | OUTPATIENT
Start: 2025-01-10 | End: 2025-01-11 | Stop reason: HOSPADM

## 2025-01-10 RX ORDER — SODIUM CHLORIDE 0.9 % (FLUSH) 0.9 %
10 SYRINGE (ML) INJECTION AS NEEDED
Status: DISCONTINUED | OUTPATIENT
Start: 2025-01-10 | End: 2025-01-11 | Stop reason: HOSPADM

## 2025-01-10 RX ORDER — ONDANSETRON 4 MG/1
4 TABLET, ORALLY DISINTEGRATING ORAL EVERY 8 HOURS PRN
Status: DISCONTINUED | OUTPATIENT
Start: 2025-01-10 | End: 2025-01-11 | Stop reason: HOSPADM

## 2025-01-10 RX ORDER — LIDOCAINE 4 G/G
1 PATCH TOPICAL
Status: DISCONTINUED | OUTPATIENT
Start: 2025-01-10 | End: 2025-01-11 | Stop reason: HOSPADM

## 2025-01-10 RX ORDER — CEPHALEXIN 500 MG/1
500 CAPSULE ORAL 3 TIMES DAILY
COMMUNITY
Start: 2025-01-08 | End: 2025-01-13 | Stop reason: HOSPADM

## 2025-01-10 RX ORDER — BISACODYL 10 MG
10 SUPPOSITORY, RECTAL RECTAL DAILY PRN
Status: DISCONTINUED | OUTPATIENT
Start: 2025-01-10 | End: 2025-01-11 | Stop reason: HOSPADM

## 2025-01-10 RX ORDER — NITROGLYCERIN 0.4 MG/1
0.4 TABLET SUBLINGUAL
Status: DISCONTINUED | OUTPATIENT
Start: 2025-01-10 | End: 2025-01-11 | Stop reason: HOSPADM

## 2025-01-10 RX ORDER — HYDROCHLOROTHIAZIDE 12.5 MG/1
12.5 TABLET ORAL
Status: DISCONTINUED | OUTPATIENT
Start: 2025-01-10 | End: 2025-01-11 | Stop reason: HOSPADM

## 2025-01-10 RX ORDER — PHENAZOPYRIDINE HYDROCHLORIDE 100 MG/1
200 TABLET, FILM COATED ORAL 3 TIMES DAILY PRN
Status: DISCONTINUED | OUTPATIENT
Start: 2025-01-10 | End: 2025-01-11 | Stop reason: HOSPADM

## 2025-01-10 RX ORDER — SODIUM CHLORIDE 0.9 % (FLUSH) 0.9 %
10 SYRINGE (ML) INJECTION EVERY 12 HOURS SCHEDULED
Status: DISCONTINUED | OUTPATIENT
Start: 2025-01-10 | End: 2025-01-11 | Stop reason: HOSPADM

## 2025-01-10 RX ORDER — POLYETHYLENE GLYCOL 3350 17 G/17G
17 POWDER, FOR SOLUTION ORAL DAILY PRN
Status: DISCONTINUED | OUTPATIENT
Start: 2025-01-10 | End: 2025-01-11 | Stop reason: HOSPADM

## 2025-01-10 RX ORDER — BISACODYL 5 MG/1
5 TABLET, DELAYED RELEASE ORAL DAILY PRN
Status: DISCONTINUED | OUTPATIENT
Start: 2025-01-10 | End: 2025-01-11 | Stop reason: HOSPADM

## 2025-01-10 RX ORDER — ROSUVASTATIN CALCIUM 20 MG/1
10 TABLET, COATED ORAL 3 TIMES WEEKLY
Status: DISCONTINUED | OUTPATIENT
Start: 2025-01-10 | End: 2025-01-10

## 2025-01-10 RX ORDER — PHENAZOPYRIDINE HYDROCHLORIDE 200 MG/1
200 TABLET, FILM COATED ORAL 3 TIMES DAILY PRN
COMMUNITY

## 2025-01-10 RX ORDER — POTASSIUM CHLORIDE 750 MG/1
40 TABLET, FILM COATED, EXTENDED RELEASE ORAL ONCE
Status: COMPLETED | OUTPATIENT
Start: 2025-01-10 | End: 2025-01-10

## 2025-01-10 RX ORDER — ACETAMINOPHEN 650 MG/1
650 SUPPOSITORY RECTAL EVERY 4 HOURS PRN
Status: DISCONTINUED | OUTPATIENT
Start: 2025-01-10 | End: 2025-01-11 | Stop reason: HOSPADM

## 2025-01-10 RX ORDER — ACETAMINOPHEN 325 MG/1
650 TABLET ORAL EVERY 4 HOURS PRN
Status: DISCONTINUED | OUTPATIENT
Start: 2025-01-10 | End: 2025-01-11 | Stop reason: HOSPADM

## 2025-01-10 RX ORDER — ROSUVASTATIN CALCIUM 20 MG/1
10 TABLET, COATED ORAL
Status: DISCONTINUED | OUTPATIENT
Start: 2025-01-11 | End: 2025-01-11 | Stop reason: HOSPADM

## 2025-01-10 RX ADMIN — LIDOCAINE PAIN RELIEF 1 PATCH: 560 PATCH TOPICAL at 16:08

## 2025-01-10 RX ADMIN — SODIUM CHLORIDE 1000 ML: 9 INJECTION, SOLUTION INTRAVENOUS at 09:50

## 2025-01-10 RX ADMIN — OXYCODONE HYDROCHLORIDE AND ACETAMINOPHEN 1 TABLET: 7.5; 325 TABLET ORAL at 20:23

## 2025-01-10 RX ADMIN — DICLOFENAC SODIUM 4 G: 10 GEL TOPICAL at 22:53

## 2025-01-10 RX ADMIN — AMLODIPINE BESYLATE 10 MG: 10 TABLET ORAL at 16:06

## 2025-01-10 RX ADMIN — CEPHALEXIN 500 MG: 500 CAPSULE ORAL at 20:23

## 2025-01-10 RX ADMIN — PHENAZOPYRIDINE 200 MG: 100 TABLET ORAL at 18:07

## 2025-01-10 RX ADMIN — ACETAMINOPHEN 650 MG: 325 TABLET ORAL at 13:22

## 2025-01-10 RX ADMIN — CEPHALEXIN 500 MG: 500 CAPSULE ORAL at 16:06

## 2025-01-10 RX ADMIN — ACETAMINOPHEN 650 MG: 325 TABLET ORAL at 18:19

## 2025-01-10 RX ADMIN — POTASSIUM CHLORIDE 40 MEQ: 750 TABLET, EXTENDED RELEASE ORAL at 13:22

## 2025-01-10 RX ADMIN — Medication 10 ML: at 20:23

## 2025-01-10 RX ADMIN — METOPROLOL SUCCINATE 50 MG: 50 TABLET, EXTENDED RELEASE ORAL at 16:06

## 2025-01-10 NOTE — PROGRESS NOTES
Clinical Pharmacy Services: Medication History    Jacob Miller is a 69 y.o. male presenting to [facility] for   Chief Complaint   Patient presents with    Dizziness       He  has a past medical history of Hyperlipidemia, Hypertension, Kidney stones, PAC (premature atrial contraction), Palpitations, and Sleep apnea.    Allergies as of 01/10/2025    (No Known Allergies)       Medication information was obtained from: Patient   Pharmacy and Phone Number:     Prior to Admission Medications       Prescriptions Last Dose Informant Patient Reported? Taking?    amLODIPine (NORVASC) 10 MG tablet  Self No Yes    TAKE 1 TABLET BY MOUTH DAILY    cephalexin (KEFLEX) 500 MG capsule  Self Yes Yes    Take 1 capsule by mouth 3 (Three) Times a Day.    lisinopril-hydrochlorothiazide (PRINZIDE,ZESTORETIC) 20-12.5 MG per tablet  Self No Yes    TAKE 2 TABLETS BY MOUTH EVERY MORNING    Patient taking differently:  Take 2 tablets by mouth Daily.    metoprolol succinate XL (TOPROL-XL) 50 MG 24 hr tablet  Self No Yes    TAKE 1 TABLET BY MOUTH DAILY    ondansetron ODT (ZOFRAN-ODT) 4 MG disintegrating tablet  Self Yes Yes    Place 1 tablet on the tongue Every 8 (Eight) Hours As Needed for Nausea or Vomiting.    oxyCODONE-acetaminophen (PERCOCET) 7.5-325 MG per tablet  Self Yes Yes    Take 1 tablet by mouth Every 8 (Eight) Hours As Needed for Moderate Pain.    phenazopyridine (PYRIDIUM) 200 MG tablet  Self Yes Yes    Take 1 tablet by mouth 3 (Three) Times a Day As Needed for Bladder Spasms.    rosuvastatin (Crestor) 10 MG tablet  Self No Yes    Take 1 tablet by mouth Daily.    Alcohol Swabs pads   No No    Clean area of injection as needed 3 times daily.    diclofenac (VOLTAREN) 75 MG EC tablet  Self No No    TAKE 1 TABLET BY MOUTH DAILY    Patient taking differently:  Take 1 tablet by mouth Daily.    OneTouch Delica Lancets 33G misc   No No    Check glucose 3 times daily    OneTouch Ultra test strip   No No    USE TO TEST  FOR BLOOD SUGAR LEVELS ONCE DAILY AS DIRECTED              Medication notes: Patient stated that he stopped taking diclofenac tablets before his surgery and was told to wait until his follow up visit to see if he should continue taking diclofenac.     This medication list is complete to the best of my knowledge as of 1/10/2025    Please call if questions.    Josias Groves  Medication History Technician   [phone]    1/10/2025 13:10 EST

## 2025-01-10 NOTE — H&P
[facility]   HISTORY AND PHYSICAL    Patient Name: Jacob Miller  : 1955  MRN: [id number]  Primary Care Physician:  Erik Benitez MD  Date of admission: 1/10/2025    Subjective   Subjective     Chief Complaint:   Chief Complaint   Patient presents with    Dizziness         HPI:    Jacob Miller is a 69 y.o. male with past medical history of hypertension, hyperlipidemia, EARLINE on CPAP, obesity, and history of BPH status post prostate surgery on 2025 who presents with complaint of syncopal episode on the morning of 1/10/2025.  Patient reports that throughout the evening he had been getting up and going to the restroom and he would feel lightheaded associated with nausea but he was able to always make it back to the bedroom.  However around 7:30 AM his lightheadedness seemed significantly worse when he got up to use the restroom any hold onto the counter then turned around to try and go back to the bed to lay down and he passed out and hit his left ribs on the counter on his way down.  Patient's wife reports that the patient was not speaking to her when he initially went down but he was grunting in response and that lasted for few seconds then she was directing him to move away from the door so that she can get into them and it took him a couple seconds to be able to follow commands, when he was speaking again he was back to baseline; all in total the event took about 1 to 2 minutes for patient to return to baseline.  No tonic-clonic movements appreciated and no tongue biting.  Wife does report that the patient had urinary incontinence during the episode but patient reports that he has had such increased frequency since his previous procedure that he is not sure that that was associated with the event.  And family report that the patient has had a temperature on 2025 of 101.5 temporally but patient denies any diaphoresis or chills or overall feeling ill.  Patient denies any chest pain,  palpitations, dyspnea, abdominal pain, vomiting, or peripheral edema.  Patient denies a history of syncopal episodes.    Review of Systems   All systems were reviewed and negative except for: All pertinent findings listed in the above HPI    Personal History     Past Medical History:   Diagnosis Date    Hyperlipidemia     Hypertension     Kidney stones     PAC (premature atrial contraction)     Palpitations     Sleep apnea     USES C-PAP MACHINE       Past Surgical History:   Procedure Laterality Date    BELPHAROPTOSIS REPAIR Bilateral     HERNIA REPAIR      ROTATOR CUFF REPAIR         Family History: family history includes Arrhythmia in his brother; Diabetes in his mother; Heart disease in his father and mother; Hypertension in his mother. Otherwise pertinent FHx was reviewed and not pertinent to current issue.    Social History:  reports that he has never smoked. He has never been exposed to tobacco smoke. He has never used smokeless tobacco. He reports current alcohol use. He reports that he does not use drugs.    Home Medications:  Alcohol Swabs, OneTouch Delica Lancets 33G, amLODIPine, cephalexin, diclofenac, glucose blood, lisinopril-hydrochlorothiazide, metoprolol succinate XL, ondansetron ODT, oxyCODONE-acetaminophen, phenazopyridine, and rosuvastatin    Allergies:  No Known Allergies    Objective   Objective     Vitals:   Temp:  [99.3 °F (37.4 °C)] 99.3 °F (37.4 °C)  Heart Rate:  [66-80] 78  Resp:  [18] 18  BP: (116-169)/(66-74) 161/67  Physical Exam    Constitutional: Awake, alert, well-groomed overall healthy appearing male   Eyes: PERRL, sclerae anicteric, no conjunctival injection, EOMI   HENT: NCAT, mucous membranes moist, normal hearing   Neck: Supple, nontender, trachea midline   Respiratory: Clear to auscultation bilaterally, nonlabored respirations on room air   Cardiovascular: RRR, no murmurs, palpable pedal pulses bilaterally   Gastrointestinal: Positive bowel sounds, soft, nontender,  nondistended, obese abdomen   Musculoskeletal: No bilateral ankle edema, no clubbing or cyanosis to extremities   Psychiatric: Appropriate affect, cooperative   Neurologic: Oriented x 3, strength symmetric in all extremities, Cranial Nerves grossly intact to confrontation, speech clear   Skin: No rashes     Result Review    Result Review:  I have personally reviewed the results from the time of this admission to 1/10/2025 14:35 EST and agree with these findings:  [x]  Laboratory list / accordion  []  Microbiology  [x]  Radiology  [x]  EKG/Telemetry   []  Cardiology/Vascular   []  Pathology  []  Old records  []  Other:  Most notable findings include: Troponins x 2 negative, serum creatinine 1.37 (baseline about 1), lactic 1.2, INR 1.1, WBC elevated at 14.73, and hemoglobin 13.6.  UA appears dark yellow, large blood, positive nitrites, and small leukocytes with 6-10 WBC but no bacteria appreciated.  Chest x-ray she says right lung is clear but very subtle area of infiltrate or elective cyst at the left lung base and PA/lateral view of the chest would be most helpful.  EKG showed sinus rhythm and appears unchanged from previous EKG      Assessment & Plan   Assessment / Plan     Brief Patient Summary:  Jacob Miller is a 69 y.o. male who is admitted for syncopal episode    Active Hospital Problems:  Active Hospital Problems    Diagnosis     **Syncope      Plan:     Syncope:  -POD 2 from prostate surgery for BPH; hemoglobin stable  -EKG sinus without any acute changes  -Continuous cardiac monitor  -Troponins negative x 2  -Echo on 12/30/2024 shows EF of 66.7%, grade 1 diastolic dysfunction,, and trace mitral valve regurg present  -Cardiology consulted  -Orthostatics every shift ordered    RODNEY:  -Encourage p.o. fluid intake  -Holding home ACE-I/HCTZ  -Will recheck labs in a.m.    Chronic hypertension  Chronic hyperlipidemia:  -Continue home regimen except holding ACE-I/HCTZ    EARLINE:  -Okay to use home CPAP or  supplemental O2 as needed for sleep    Obesity:  -BMI 29.03, diet and exercise encouraged      VTE Prophylaxis:  Mechanical VTE prophylaxis orders are present.        CODE STATUS:    Level Of Support Discussed With: Patient  Code Status (Patient has no pulse and is not breathing): CPR (Attempt to Resuscitate)  Medical Interventions (Patient has pulse or is breathing): Full Support    Admission Status:  I believe this patient meets observation status.    Electronically signed by Cindy Bhat PA-C, 01/10/25, 2:35 PM EST.        75 minutes has been spent by [facility] providers in the care of this patient while under observation status      I have worn appropriate PPE during this patient encounter, sanitized my hands both with entering and exiting patient's room.    I have discussed plan of care with patient including advance care plan and/or surrogate decision maker.  Patient advises that their wife Lorie will be their primary surrogate decision maker

## 2025-01-10 NOTE — ED PROVIDER NOTES
EMERGENCY DEPARTMENT ENCOUNTER    Room Number:  31/31  PCP: Erik Benitez MD  Historian: Patient      HPI:  Chief Complaint: Syncope  A complete HPI/ROS/PMH/PSH/SH/FH are unobtainable due to: None  Context: Jacob Miller is a 69 y.o. male who presents to the ED c/o sudden onset of several near syncopal episodes with an associated syncopal episode that occurred just prior to ED arrival today.  The patient reports that he very recently underwent prostate surgery and he states that he feels dizzy/lightheaded with a near syncopal episodes each time he sits on the toilet to urinate.  He denies any difficulty with ambulation or any obvious discomfort.  He does report that the symptoms at this point are at least moderate if not severe in intensity.  He denies headache, chest pain, shortness of breath, nausea/vomiting, or fever/chills.  He does report rib discomfort on the left side when he fell during the syncopal event today.            PAST MEDICAL HISTORY  Active Ambulatory Problems     Diagnosis Date Noted    Benign essential hypertension 06/24/2016    Hypercholesterolemia 06/24/2016    Obstructive sleep apnea 06/24/2016    Hyperglycemia 06/24/2016    Actinic keratosis 06/24/2016    Benign non-nodular prostatic hyperplasia without lower urinary tract symptoms 06/20/2017    Osteoarthritis of both knees 01/14/2018    Palpitations 07/26/2018    Biceps tendinitis on left 07/26/2018    Overweight (BMI 25.0-29.9) 03/10/2019    Overactive bladder 09/17/2020    Other male erectile dysfunction 10/28/2021    Encounter for subsequent annual wellness visit in Medicare patient 05/24/2022    I will check 08/10/2022    Elevated PSA 04/21/2023    Viral pharyngitis 02/15/2024     Resolved Ambulatory Problems     Diagnosis Date Noted    Well adult exam 06/24/2016     Past Medical History:   Diagnosis Date    Hyperlipidemia     Hypertension     Kidney stones     PAC (premature atrial contraction)     Sleep apnea          PAST  SURGICAL HISTORY  Past Surgical History:   Procedure Laterality Date    BELPHAROPTOSIS REPAIR Bilateral     HERNIA REPAIR      ROTATOR CUFF REPAIR           FAMILY HISTORY  Family History   Problem Relation Age of Onset    Heart disease Mother     Hypertension Mother     Diabetes Mother     Heart disease Father     Arrhythmia Brother          SOCIAL HISTORY  Social History     Socioeconomic History    Marital status:    Tobacco Use    Smoking status: Never     Passive exposure: Never    Smokeless tobacco: Never    Tobacco comments:     Daily caffeine use   Vaping Use    Vaping status: Never Used   Substance and Sexual Activity    Alcohol use: Yes     Comment: occ    Drug use: No    Sexual activity: Defer         ALLERGIES  Patient has no known allergies.        REVIEW OF SYSTEMS  Review of Systems   Constitutional:  Negative for activity change, appetite change and fever.   HENT:  Negative for congestion and sore throat.    Eyes: Negative.    Respiratory:  Negative for cough and shortness of breath.    Cardiovascular:  Negative for chest pain and leg swelling.   Gastrointestinal:  Negative for abdominal pain, diarrhea and vomiting.   Endocrine: Negative.    Genitourinary:  Negative for decreased urine volume and dysuria.   Musculoskeletal:  Negative for neck pain.        Left-sided rib pain   Skin:  Negative for rash and wound.   Allergic/Immunologic: Negative.    Neurological:  Positive for dizziness, syncope and light-headedness. Negative for weakness, numbness and headaches.   Hematological: Negative.    Psychiatric/Behavioral: Negative.     All other systems reviewed and are negative.         PHYSICAL EXAM  ED Triage Vitals [01/10/25 0927]   Temp Heart Rate Resp BP SpO2   99.3 °F (37.4 °C) 66 18 116/74 95 %      Temp src Heart Rate Source Patient Position BP Location FiO2 (%)   -- -- -- -- --       Physical Exam  Constitutional:       General: He is not in acute distress.     Appearance: Normal  appearance. He is not ill-appearing or toxic-appearing.   HENT:      Head: Normocephalic and atraumatic.   Eyes:      Extraocular Movements: Extraocular movements intact.      Pupils: Pupils are equal, round, and reactive to light.   Cardiovascular:      Rate and Rhythm: Normal rate and regular rhythm.      Heart sounds: No murmur heard.     No friction rub. No gallop.   Pulmonary:      Effort: Pulmonary effort is normal.      Breath sounds: Normal breath sounds.   Abdominal:      General: Abdomen is flat. There is no distension.      Palpations: Abdomen is soft.      Tenderness: There is no abdominal tenderness.   Musculoskeletal:         General: No swelling or tenderness. Normal range of motion.      Cervical back: Normal range of motion and neck supple.      Comments: Mild tenderness to the left mid axillary rib/chest region with reproduction in symptoms, without palpable fracture or crepitus.   Skin:     General: Skin is warm and dry.   Neurological:      General: No focal deficit present.      Mental Status: He is alert and oriented to person, place, and time.      Sensory: No sensory deficit.      Motor: No weakness.   Psychiatric:         Mood and Affect: Mood normal.         Behavior: Behavior normal.         Vital signs and nursing notes reviewed.          LAB RESULTS  Recent Results (from the past 24 hours)   Comprehensive Metabolic Panel    Collection Time: 01/10/25  9:48 AM    Specimen: Blood   Result Value Ref Range    Glucose 152 (H) 65 - 99 mg/dL    BUN 19 8 - 23 mg/dL    Creatinine 1.37 (H) 0.76 - 1.27 mg/dL    Sodium 138 136 - 145 mmol/L    Potassium 3.5 3.5 - 5.2 mmol/L    Chloride 100 98 - 107 mmol/L    CO2 27.2 22.0 - 29.0 mmol/L    Calcium 8.8 8.6 - 10.5 mg/dL    Total Protein 6.6 6.0 - 8.5 g/dL    Albumin 3.9 3.5 - 5.2 g/dL    ALT (SGPT) 17 1 - 41 U/L    AST (SGOT) 16 1 - 40 U/L    Alkaline Phosphatase 78 39 - 117 U/L    Total Bilirubin 0.6 0.0 - 1.2 mg/dL    Globulin 2.7 gm/dL    A/G Ratio  1.4 g/dL    BUN/Creatinine Ratio 13.9 7.0 - 25.0    Anion Gap 10.8 5.0 - 15.0 mmol/L    eGFR 55.1 (L) >60.0 mL/min/1.73   Protime-INR    Collection Time: 01/10/25  9:48 AM    Specimen: Blood   Result Value Ref Range    Protime 14.5 (H) 11.7 - 14.2 Seconds    INR 1.10 0.90 - 1.10   aPTT    Collection Time: 01/10/25  9:48 AM    Specimen: Blood   Result Value Ref Range    PTT 29.5 22.7 - 35.4 seconds   High Sensitivity Troponin T    Collection Time: 01/10/25  9:48 AM    Specimen: Blood   Result Value Ref Range    HS Troponin T 15 <22 ng/L   Lactic Acid, Plasma    Collection Time: 01/10/25  9:48 AM    Specimen: Blood   Result Value Ref Range    Lactate 1.2 0.5 - 2.0 mmol/L   CBC Auto Differential    Collection Time: 01/10/25  9:48 AM    Specimen: Blood   Result Value Ref Range    WBC 14.73 (H) 3.40 - 10.80 10*3/mm3    RBC 5.10 4.14 - 5.80 10*6/mm3    Hemoglobin 13.6 13.0 - 17.7 g/dL    Hematocrit 43.1 37.5 - 51.0 %    MCV 84.5 79.0 - 97.0 fL    MCH 26.7 26.6 - 33.0 pg    MCHC 31.6 31.5 - 35.7 g/dL    RDW 15.8 (H) 12.3 - 15.4 %    RDW-SD 48.5 37.0 - 54.0 fl    MPV 11.0 6.0 - 12.0 fL    Platelets 211 140 - 450 10*3/mm3    nRBC 0.0 0.0 - 0.2 /100 WBC   Manual Differential    Collection Time: 01/10/25  9:48 AM    Specimen: Blood   Result Value Ref Range    Neutrophil % 70.0 42.7 - 76.0 %    Lymphocyte % 10.0 (L) 19.6 - 45.3 %    Monocyte % 19.0 (H) 5.0 - 12.0 %    Eosinophil % 1.0 0.3 - 6.2 %    Basophil % 0.0 0.0 - 1.5 %    Neutrophils Absolute 10.31 (H) 1.70 - 7.00 10*3/mm3    Lymphocytes Absolute 1.47 0.70 - 3.10 10*3/mm3    Monocytes Absolute 2.80 (H) 0.10 - 0.90 10*3/mm3    Eosinophils Absolute 0.15 0.00 - 0.40 10*3/mm3    Basophils Absolute 0.00 0.00 - 0.20 10*3/mm3    RBC Morphology Normal Normal    WBC Morphology Normal Normal    Platelet Morphology Normal Normal   ECG 12 Lead Syncope    Collection Time: 01/10/25 10:09 AM   Result Value Ref Range    QT Interval 377 ms    QTC Interval 401 ms   Urinalysis With  Microscopic If Indicated (No Culture) - Urine, Clean Catch    Collection Time: 01/10/25 10:18 AM    Specimen: Urine, Clean Catch   Result Value Ref Range    Color, UA Dark Yellow (A) Yellow, Straw    Appearance, UA Clear Clear    pH, UA 5.5 5.0 - 8.0    Specific Gravity, UA <=1.005 1.005 - 1.030    Glucose, UA Negative Negative    Ketones, UA Negative Negative    Bilirubin, UA Negative Negative    Blood, UA Large (3+) (A) Negative    Protein, UA Trace (A) Negative    Leuk Esterase, UA Small (1+) (A) Negative    Nitrite, UA Positive (A) Negative    Urobilinogen, UA 0.2 E.U./dL 0.2 - 1.0 E.U./dL   Urinalysis, Microscopic Only - Urine, Clean Catch    Collection Time: 01/10/25 10:18 AM    Specimen: Urine, Clean Catch   Result Value Ref Range    RBC, UA 3-5 (A) None Seen, 0-2 /HPF    WBC, UA 6-10 (A) None Seen, 0-2 /HPF    Bacteria, UA None Seen None Seen /HPF    Squamous Epithelial Cells, UA 0-2 None Seen, 0-2 /HPF    Hyaline Casts, UA 0-2 None Seen /LPF    Methodology Automated Microscopy    High Sensitivity Troponin T 1Hr    Collection Time: 01/10/25 10:58 AM    Specimen: Blood   Result Value Ref Range    HS Troponin T 13 <22 ng/L    Troponin T Numeric Delta -2 Abnormal if >/=3 ng/L       Ordered the above labs and reviewed the results.        RADIOLOGY  XR Chest 1 View    Result Date: 1/10/2025  XR CHEST 1 VW-  Clinical: Syncope, trauma  COMPARISON 4/26/2023  FINDINGS: The right lung is clear, no vascular congestion or pleural effusion seen. Very subtle area of infiltrate or elective cyst at the left lung base. Cardiomediastinal silhouette is stable. No other interval change has occurred. PA and lateral view of the chest would be most helpful when patient's condition permits.  This report was finalized on 1/10/2025 10:46 AM by Dr. Vaughn Knox M.D on Workstation: [id number]       Ordered the above noted radiological studies. Reviewed by me in PACS.            PROCEDURES  Procedures    EKG independently  interpreted by myself as follows    EKG          EKG time: 1009  Rhythm/Rate: NSR, 68  P waves and MS: nml  QRS, axis: nml, nml   ST and T waves: nml     Interpreted Contemporaneously by me, independently viewed  unchanged compared to prior 12/20/24            MEDICATIONS GIVEN IN ER  Medications   sodium chloride 0.9 % flush 10 mL (has no administration in time range)   sodium chloride 0.9 % bolus 1,000 mL (1,000 mL Intravenous New Bag 1/10/25 0950)                   MEDICAL DECISION MAKING, PROGRESS, and CONSULTS    All labs have been independently reviewed by me.  All radiology studies have been reviewed by me and I have also reviewed the radiology report.   EKG's independently viewed and interpreted by me.  Discussion below represents my analysis of pertinent findings related to patient's condition, differential diagnosis, treatment plan and final disposition.      Additional sources:  - Discussed/ obtained information from independent historians: History obtained from the patient as well as the patient's family at bedside.    - External (non-ED) record review: Upon medical records review, the patient was seen and evaluated the outpatient office of his family medicine provider on 12/12/2024 for upper respiratory symptoms.    - Chronic or social conditions impacting care: Hypertension, hyperlipidemia    - Shared decision making: Admission decision based on shared conversations have between myself, the patient and family at bedside, as well as Nataliia Bhat PA-C.      Orders placed during this visit:  Orders Placed This Encounter   Procedures    XR Chest 1 View    Comprehensive Metabolic Panel    Protime-INR    aPTT    Urinalysis With Microscopic If Indicated (No Culture) - Urine, Clean Catch    High Sensitivity Troponin T    Lactic Acid, Plasma    CBC Auto Differential    Manual Differential    High Sensitivity Troponin T 1Hr    Urinalysis, Microscopic Only - Urine, Clean Catch    ECG 12 Lead Syncope    Insert  Peripheral IV    Initiate ED Observation Status    CBC & Differential             Differential diagnosis includes but is not limited to:    Vasovagal syncope, cardiogenic syncope, long QT syndrome, dehydration, urinary tract infection, acute anemia, sepsis, acute renal failure, or electrolyte disturbance      Independent interpretation of labs, radiology studies, and discussions with consultants:    Chest x-ray independently interpreted by myself with my interpretation showing no evidence of obvious rib fracture, pneumothorax, or infiltrate.      ED Course as of 01/10/25 1302   Fri Rk 10, 2025   1225 On reevaluation, the patient is resting comfortably and without any further complaints.  I did inform him that his creatinine is mildly elevated potentially signifying some form of dehydration maybe.  However, the remainder of his workup is fairly unremarkable.  I am uncertain at this point as to what is causing his near syncopal and syncopal episodes.  I would like to admit him today to the observation unit for further workup and evaluation.  He and his family agree with that plan and all questions answered. [BM]   1302 The patient's presentation, workup, as well as diagnosis and treatment plan was discussed at length with Cindy Bhat PA-C.  She agrees to admit the patient to the observation unit today with Dr. Schroeder. [BM]      ED Course User Index  [BM] Adolfo Bhat MD           DIAGNOSIS  Final diagnoses:   Syncope and collapse   Acute renal insufficiency   Leukocytosis, unspecified type         DISPOSITION  ADMISSION    Discussed treatment plan and reason for admission with pt/family and admitting physician.  Pt/family voiced understanding of the plan for admission for further testing/treatment as needed.               Latest Documented Vital Signs:  As of 13:02 EST  BP- 116/74 HR- 66 Temp- 99.3 °F (37.4 °C) O2 sat- 95%              --    Please note that portions of this were completed with a voice  recognition program.       Note Disclaimer: At [facility], we believe that sharing information builds trust and better relationships. You are receiving this note because you are receiving care at [facility] or recently visited. It is possible you will see health information before a provider has talked with you about it. This kind of information can be easy to misunderstand. To help you fully understand what it means for your health, we urge you to discuss this note with your provider.             Adolfo Bhat MD  01/10/25 0722

## 2025-01-10 NOTE — ED NOTES
Pt to Er via EMS from home for two near-syncopal episodes today. Pt denies any s/s. Pt says he becomes dizzy only when he is sitting on the toilet

## 2025-01-11 ENCOUNTER — READMISSION MANAGEMENT (OUTPATIENT)
Dept: CALL CENTER | Facility: HOSPITAL | Age: 70
End: 2025-01-11
Payer: MEDICARE

## 2025-01-11 VITALS
HEIGHT: 73 IN | DIASTOLIC BLOOD PRESSURE: 64 MMHG | BODY MASS INDEX: 29.16 KG/M2 | SYSTOLIC BLOOD PRESSURE: 145 MMHG | RESPIRATION RATE: 17 BRPM | TEMPERATURE: 98.4 F | WEIGHT: 220 LBS | OXYGEN SATURATION: 97 % | HEART RATE: 76 BPM

## 2025-01-11 LAB
ALBUMIN SERPL-MCNC: 3.7 G/DL (ref 3.5–5.2)
ALBUMIN/GLOB SERPL: 1.4 G/DL
ALP SERPL-CCNC: 71 U/L (ref 39–117)
ALT SERPL W P-5'-P-CCNC: 23 U/L (ref 1–41)
ANION GAP SERPL CALCULATED.3IONS-SCNC: 12 MMOL/L (ref 5–15)
ANISOCYTOSIS BLD QL: ABNORMAL
AST SERPL-CCNC: 20 U/L (ref 1–40)
BASOPHILS # BLD MANUAL: 0 10*3/MM3 (ref 0–0.2)
BASOPHILS NFR BLD MANUAL: 0 % (ref 0–1.5)
BILIRUB SERPL-MCNC: 0.4 MG/DL (ref 0–1.2)
BUN SERPL-MCNC: 17 MG/DL (ref 8–23)
BUN/CREAT SERPL: 15.2 (ref 7–25)
CALCIUM SPEC-SCNC: 8.7 MG/DL (ref 8.6–10.5)
CHLORIDE SERPL-SCNC: 105 MMOL/L (ref 98–107)
CO2 SERPL-SCNC: 26 MMOL/L (ref 22–29)
CREAT SERPL-MCNC: 1.12 MG/DL (ref 0.76–1.27)
DEPRECATED RDW RBC AUTO: 48.6 FL (ref 37–54)
EGFRCR SERPLBLD CKD-EPI 2021: 70.2 ML/MIN/1.73
EOSINOPHIL # BLD MANUAL: 0 10*3/MM3 (ref 0–0.4)
EOSINOPHIL NFR BLD MANUAL: 0 % (ref 0.3–6.2)
ERYTHROCYTE [DISTWIDTH] IN BLOOD BY AUTOMATED COUNT: 15.7 % (ref 12.3–15.4)
GLOBULIN UR ELPH-MCNC: 2.6 GM/DL
GLUCOSE SERPL-MCNC: 115 MG/DL (ref 65–99)
HCT VFR BLD AUTO: 37.2 % (ref 37.5–51)
HGB BLD-MCNC: 12.4 G/DL (ref 13–17.7)
LYMPHOCYTES # BLD MANUAL: 1.51 10*3/MM3 (ref 0.7–3.1)
LYMPHOCYTES NFR BLD MANUAL: 19.8 % (ref 5–12)
MCH RBC QN AUTO: 28.2 PG (ref 26.6–33)
MCHC RBC AUTO-ENTMCNC: 33.3 G/DL (ref 31.5–35.7)
MCV RBC AUTO: 84.7 FL (ref 79–97)
MICROCYTES BLD QL: ABNORMAL
MONOCYTES # BLD: 1.91 10*3/MM3 (ref 0.1–0.9)
NEUTROPHILS # BLD AUTO: 6.24 10*3/MM3 (ref 1.7–7)
NEUTROPHILS NFR BLD MANUAL: 64.6 % (ref 42.7–76)
NRBC BLD AUTO-RTO: 0 /100 WBC (ref 0–0.2)
PLAT MORPH BLD: NORMAL
PLATELET # BLD AUTO: 196 10*3/MM3 (ref 140–450)
PMV BLD AUTO: 11.1 FL (ref 6–12)
POLYCHROMASIA BLD QL SMEAR: ABNORMAL
POTASSIUM SERPL-SCNC: 3.6 MMOL/L (ref 3.5–5.2)
PROT SERPL-MCNC: 6.3 G/DL (ref 6–8.5)
QT INTERVAL: 372 MS
QTC INTERVAL: 416 MS
RBC # BLD AUTO: 4.39 10*6/MM3 (ref 4.14–5.8)
SODIUM SERPL-SCNC: 143 MMOL/L (ref 136–145)
VARIANT LYMPHS NFR BLD MANUAL: 15.6 % (ref 19.6–45.3)
WBC MORPH BLD: NORMAL
WBC NRBC COR # BLD AUTO: 9.66 10*3/MM3 (ref 3.4–10.8)

## 2025-01-11 PROCEDURE — G0378 HOSPITAL OBSERVATION PER HR: HCPCS

## 2025-01-11 PROCEDURE — 93010 ELECTROCARDIOGRAM REPORT: CPT | Performed by: INTERNAL MEDICINE

## 2025-01-11 PROCEDURE — 85007 BL SMEAR W/DIFF WBC COUNT: CPT | Performed by: EMERGENCY MEDICINE

## 2025-01-11 PROCEDURE — 99204 OFFICE O/P NEW MOD 45 MIN: CPT | Performed by: INTERNAL MEDICINE

## 2025-01-11 PROCEDURE — 85025 COMPLETE CBC W/AUTO DIFF WBC: CPT | Performed by: EMERGENCY MEDICINE

## 2025-01-11 PROCEDURE — 80053 COMPREHEN METABOLIC PANEL: CPT | Performed by: EMERGENCY MEDICINE

## 2025-01-11 PROCEDURE — 93005 ELECTROCARDIOGRAM TRACING: CPT | Performed by: INTERNAL MEDICINE

## 2025-01-11 RX ORDER — POTASSIUM CHLORIDE 750 MG/1
40 TABLET, FILM COATED, EXTENDED RELEASE ORAL EVERY 4 HOURS
Status: COMPLETED | OUTPATIENT
Start: 2025-01-11 | End: 2025-01-11

## 2025-01-11 RX ADMIN — LIDOCAINE PAIN RELIEF 1 PATCH: 560 PATCH TOPICAL at 09:21

## 2025-01-11 RX ADMIN — POTASSIUM CHLORIDE 40 MEQ: 750 TABLET, EXTENDED RELEASE ORAL at 05:05

## 2025-01-11 RX ADMIN — DICLOFENAC SODIUM 4 G: 10 GEL TOPICAL at 09:18

## 2025-01-11 RX ADMIN — POTASSIUM CHLORIDE 40 MEQ: 750 TABLET, EXTENDED RELEASE ORAL at 09:19

## 2025-01-11 RX ADMIN — Medication 10 ML: at 09:21

## 2025-01-11 RX ADMIN — ACETAMINOPHEN 650 MG: 325 TABLET ORAL at 06:14

## 2025-01-11 RX ADMIN — CEPHALEXIN 500 MG: 500 CAPSULE ORAL at 09:18

## 2025-01-11 RX ADMIN — METOPROLOL SUCCINATE 50 MG: 50 TABLET, EXTENDED RELEASE ORAL at 09:19

## 2025-01-11 RX ADMIN — AMLODIPINE BESYLATE 10 MG: 10 TABLET ORAL at 09:18

## 2025-01-11 NOTE — CONSULTS
Cardiology History & Physical / Consultation      Patient Name: Jacob Miller  Age/Sex: 69 y.o. male  : 1955  MRN: [id number]    Date of Admission: 1/10/2025  Date of Encounter Visit: 25  Encounter Provider: Yeyo Garnica MD  Referring Provider: Yusef Schroeder MD  Place of Service: [facility] CARDIOLOGY  Patient Care Team:  Erik Benitez MD as PCP - General (Internal Medicine)          Subjective:     Chief Complaint:  Dizziness     Reason for consultation: Syncope     History of Present Illness:  Jacob Miller is a 69 y.o. male, previously seen by Dr. Posada with a past medical history notable for hypertension, hyperlipidemia, palpitations, EARLINE and BPH.    He presented to the ED on 1-10-25 after several near syncopal episodes and 1 episode where he passed out completely hitting his ribs as he went down.  This was witnessed by his wife who said it took a couple of minutes for him to fully wake up.   He had no evidence of seizure activity per his wife.  He reports that he has had worsening dizziness since his prostate surgery on 25 especially when sitting on the toilet to urinate.  Patient said when he stood up he got very flushed started to get nauseated.  This happened every single time before he thought he was going to pass out.  He was also dizzy and lightheaded.  Blood pressure upon arrival was 116/74, HR 66.      Work up is notable for normal troponin's, creatinine 1.37 which improved after fluid bolus, normal lactate, hemoglobin 12.4 this morning.  Urinalysis shows large amount of blood and is positive for nitrates.  EKG shows sinus rhythm, no ischemic changes and similar to previous.  Echocardiogram done pre-operatively shows a normal EF, nodular thickening on aortic valve.  Orthostatics have been negative.                        ECHO 24    Left ventricular systolic function is normal. Calculated left ventricular EF = 66.7%  Normal global longitudinal LV strain (GLS) = -24.6%. Left ventricle strain data was reviewed by the physician and found to be accurate. Normal left ventricular cavity size and wall thickness noted. All left ventricular wall segments contract normally. Left ventricular diastolic function is consistent with (grade I) impaired relaxation.    There is nodular thickening on the leaflet edge of the the noncoronary cusp. This is new from prior study dated 9/26/2018 The aortic valve appears trileaflet. No aortic valve regurgitation is present.    Trace mitral valve regurgitation is present.    48 HOUR HOLTER 7-27-18  1.  Normal sinus rhythm  2.  Frequent premature atrial ectopics  3.  Rare unifocal PVCs  4.  No ventricular tachycardia or atrial fibrillation observed  5.  No pauses or high degree heart block observed    Past Medical History:  Past Medical History:   Diagnosis Date    Hyperlipidemia     Hypertension     Kidney stones     PAC (premature atrial contraction)     Palpitations     Sleep apnea     USES C-PAP MACHINE       Past Surgical History:   Procedure Laterality Date    BELPHAROPTOSIS REPAIR Bilateral     HERNIA REPAIR      ROTATOR CUFF REPAIR         Home Medications:   Medications Prior to Admission   Medication Sig Dispense Refill Last Dose/Taking    amLODIPine (NORVASC) 10 MG tablet TAKE 1 TABLET BY MOUTH DAILY 90 tablet 0 1/9/2025    cephalexin (KEFLEX) 500 MG capsule Take 1 capsule by mouth 3 (Three) Times a Day.   1/9/2025    lisinopril-hydrochlorothiazide (PRINZIDE,ZESTORETIC) 20-12.5 MG per tablet TAKE 2 TABLETS BY MOUTH EVERY MORNING (Patient taking differently: Take 2 tablets by mouth Daily.) 180 tablet 1 1/9/2025    metoprolol succinate XL (TOPROL-XL) 50 MG 24 hr tablet TAKE 1 TABLET BY MOUTH DAILY 90 tablet 1 1/9/2025    ondansetron ODT (ZOFRAN-ODT) 4 MG disintegrating tablet Place 1 tablet on the tongue Every 8 (Eight) Hours As Needed for Nausea or Vomiting.   1/9/2025    oxyCODONE-acetaminophen  (PERCOCET) 7.5-325 MG per tablet Take 1 tablet by mouth Every 8 (Eight) Hours As Needed for Moderate Pain.   1/9/2025    phenazopyridine (PYRIDIUM) 200 MG tablet Take 1 tablet by mouth 3 (Three) Times a Day As Needed for Bladder Spasms.   1/10/2025    rosuvastatin (Crestor) 10 MG tablet Take 1 tablet by mouth Daily. (Patient taking differently: Take 1 tablet by mouth 3 (Three) Times a Week. Tuesday, Thursday, saturday) 90 tablet 3 Taking Differently    Alcohol Swabs pads Clean area of injection as needed 3 times daily. 100 each 3     diclofenac (VOLTAREN) 75 MG EC tablet TAKE 1 TABLET BY MOUTH DAILY (Patient taking differently: Take 1 tablet by mouth Daily.) 30 tablet 1     OneTouch Delica Lancets 33G misc Check glucose 3 times daily 100 each 3     OneTouch Ultra test strip USE TO TEST FOR BLOOD SUGAR LEVELS ONCE DAILY AS DIRECTED 100 each 11        Allergies:  No Known Allergies    Past Social History:  Social History     Socioeconomic History    Marital status:    Tobacco Use    Smoking status: Never     Passive exposure: Never    Smokeless tobacco: Never    Tobacco comments:     Daily caffeine use   Vaping Use    Vaping status: Never Used   Substance and Sexual Activity    Alcohol use: Yes     Comment: occ    Drug use: No    Sexual activity: Defer       Past Family History: History reviewed. No pertinent family history.   Family History   Problem Relation Age of Onset    Heart disease Mother     Hypertension Mother     Diabetes Mother     Heart disease Father     Arrhythmia Brother        Review of Systems        Objective:     Objective:  Temp:  [98.4 °F (36.9 °C)-99.3 °F (37.4 °C)] 98.4 °F (36.9 °C)  Heart Rate:  [65-80] 76  Resp:  [17-18] 17  BP: (134-169)/(64-85) 145/64  No intake or output data in the 24 hours ending 01/11/25 0938  Body mass index is 29.03 kg/m².      01/10/25  1302 01/10/25  1409   Weight: 99.8 kg (220 lb) 99.8 kg (220 lb)           Physical Exam:   Vitals reviewed.   Constitutional:        Appearance: Healthy appearance.   Pulmonary:      Effort: Pulmonary effort is normal.   Cardiovascular:      Normal rate. Regular rhythm. Normal S1. Normal S2.       Murmurs: There is no murmur.      No gallop.  No click. No rub.   Pulses:     Intact distal pulses.   Edema:     Peripheral edema absent.   Neurological:      Mental Status: Alert.          Labs:   Lab Review:     Results from last 7 days   Lab Units 25  0256 01/10/25  0948   SODIUM mmol/L 143 138   POTASSIUM mmol/L 3.6 3.5   CHLORIDE mmol/L 105 100   CO2 mmol/L 26.0 27.2   BUN mg/dL 17 19   CREATININE mg/dL 1.12 1.37*   GLUCOSE mg/dL 115* 152*   CALCIUM mg/dL 8.7 8.8   AST (SGOT) U/L 20 16   ALT (SGPT) U/L 23 17     Results from last 7 days   Lab Units 01/10/25  1058 01/10/25  0948   HSTROP T ng/L 13 15     Results from last 7 days   Lab Units 25  0256   WBC 10*3/mm3 9.66   HEMOGLOBIN g/dL 12.4*   HEMATOCRIT % 37.2*   PLATELETS 10*3/mm3 196     Results from last 7 days   Lab Units 01/10/25  0948   INR  1.10   APTT seconds 29.5                               PREVIOUS EK24        EK-10-25        Assessment:       Syncope        Plan:     1.  Syncope classic vasovagal syncope.  Patient had a recent prostate surgery his creatinine was also elevated due to dehydration.  This is improved with hydration patient feels back to normal.  He had prodromes with every episode including feeling flushed nausea dizziness and some lightheadedness.  He had an echocardiogram preop which was just at the end of 2024.  Physical exam is unremarkable.  ECG this morning is unremarkable.  Patient has early R wave progression which is unchanged from before.  In light of that I would do no further workup.  I am okay with discharging patient from a cardiovascular standpoint.  Patient should follow-up with my office either with myself or ROB Ayers in 2 to 4 weeks.    Thank you for allowing me to participate in the care of  Jacob Miller. Feel free to contact me directly with any further questions or concerns.    Yeyo Garnica MD  [facility]  01/11/25  09:38 EST

## 2025-01-11 NOTE — PLAN OF CARE
Problem: Adult Inpatient Plan of Care  Goal: Plan of Care Review  1/11/2025 0557 by Daisha Najera RN  Outcome: Progressing  Flowsheets (Taken 1/11/2025 0554)  Outcome Evaluation: oriented, no c/o pain, ind to standby assist to the bathroom, CPAP @night (O2 in the high 80s- mid 90s), and consult to cards  1/11/2025 0554 by Daisha Najera RN  Outcome: Progressing  Flowsheets (Taken 1/11/2025 0554)  Outcome Evaluation: oriented, no c/o pain, ind to standby assist to the bathroom, CPAP @night (O2 in the high 80s- mid 90s), and consult to cards  1/11/2025 0554 by Daisha Najera RN  Outcome: Progressing  Flowsheets (Taken 1/11/2025 0554)  Outcome Evaluation: oriented, no c/o pain, ind to standby assist to the bathroom, CPAP @night (O2 in the high 80s- mid 90s), and consult to cards  Plan of Care Reviewed With: patient   Goal Outcome Evaluation:  Plan of Care Reviewed With: patient           Outcome Evaluation: oriented, no c/o pain, ind to standby assist to the bathroom, CPAP @night (O2 in the high 80s- mid 90s), and consult to cards

## 2025-01-11 NOTE — PLAN OF CARE
Goal Outcome Evaluation:  Plan of Care Reviewed With: patient           Outcome Evaluation: oriented, no c/o pain, ind to standby assist to the bathroom, CPAP @night (O2 in the high 80s- mid 90s), and consult to cards

## 2025-01-11 NOTE — OUTREACH NOTE
Prep Survey      Flowsheet Row Responses   [facility] patient discharged from? [place]   Is LACE score < 7 ? Yes   Eligibility [facility]   Date of Admission 01/10/25   Date of Discharge 01/11/25   Discharge Disposition Home or Self Care   Discharge diagnosis Syncope   Does the patient have one of the following disease processes/diagnoses(primary or secondary)? Other   Does the patient have Home health ordered? No   Is there a DME ordered? No   Prep survey completed? Yes            Leilani VIVAR - Registered Nurse

## 2025-01-11 NOTE — PLAN OF CARE
Goal Outcome Evaluation:  Patient is alert and oriented times 4. On room air. Vital signs are stable. AVS and discharge instructions given to patient. Patient is agreeable with plan of care. Questions and concerns addressed. Up ad mike with activity. IV removed. Patient to follow up with primary care provider and cardiology. Patient left with spouse via private vehicle with all personal belongings.

## 2025-01-11 NOTE — DISCHARGE SUMMARY
ED OBSERVATION PROGRESS/DISCHARGE SUMMARY    Date of Admission: 1/10/2025   LOS: 0 days   PCP: Erik Benitez MD    Final Diagnosis syncope      Subjective     Hospital Outcome:   Jacob Miller is a 69 y.o. male with past medical history of hypertension, hyperlipidemia, EARLINE on CPAP, obesity, and history of BPH status post prostate surgery on 1/8/2025 who presents with complaint of syncopal episode on the morning of 1/10/2025.  Patient reports that throughout the evening he had been getting up and going to the restroom and he would feel lightheaded associated with nausea but he was able to always make it back to the bedroom.  However around 7:30 AM his lightheadedness seemed significantly worse when he got up to use the restroom any hold onto the counter then turned around to try and go back to the bed to lay down and he passed out and hit his left ribs on the counter on his way down.  Patient's wife reports that the patient was not speaking to her when he initially went down but he was grunting in response and that lasted for few seconds then she was directing him to move away from the door so that she can get into them and it took him a couple seconds to be able to follow commands, when he was speaking again he was back to baseline; all in total the event took about 1 to 2 minutes for patient to return to baseline.  No tonic-clonic movements appreciated and no tongue biting.  Wife does report that the patient had urinary incontinence during the episode but patient reports that he has had such increased frequency since his previous procedure that he is not sure that that was associated with the event.  And family report that the patient has had a temperature on 1/9/2025 of 101.5 temporally but patient denies any diaphoresis or chills or overall feeling ill.  Patient denies any chest pain, palpitations, dyspnea, abdominal pain, vomiting, or peripheral edema.  Patient denies a history of syncopal episodes.      1/11/25: No acute events overnight.  Patient resting comfortably.  No chest pain or shortness of breath reported.  Orthostatics have been normal throughout admission.  Cardiology saw and evaluated the patient and suspects that the syncopal episode is secondary to vasovagal syncope.  They recommended repeating EKG which was normal and no Recommended at this time.  Cardiology okay with patient discharging home and plan for follow-up in their office in the next 2 to 4 weeks.  All labs and imaging findings discussed with patient as well as specialist recommendations and patient is agreeable for discharge home at this time.    ROS:  General: no fevers, chills  Respiratory: no cough, dyspnea  Cardiovascular: no chest pain, palpitations  Abdomen: No abdominal pain, nausea, vomiting, or diarrhea  Neurologic: No focal weakness    Objective   Physical Exam:  I have reviewed the vital signs.  Temp:  [98.7 °F (37.1 °C)-99.3 °F (37.4 °C)] 98.7 °F (37.1 °C)  Heart Rate:  [65-80] 65  Resp:  [18] 18  BP: (116-169)/(65-74) 136/70  General Appearance:    Alert, cooperative, no distress  Head:    Normocephalic, atraumatic, normal hearing  Eyes:    Sclerae anicteric, EOMI  Neck:   Supple, nontender  Lungs: Clear to auscultation bilaterally, respirations unlabored on room air  Heart: Regular rate and rhythm, S1 and S2 normal, no murmur  Abdomen:  Soft, nontender, bowel sounds active, nondistended  Extremities: No clubbing, cyanosis, or edema to lower extremities  Pulses:  2+ and symmetric in distal lower extremities  Skin: No rashes   Neurologic: Oriented x3, Normal strength to extremities    Results Review:    I have reviewed the labs, radiology results and diagnostic studies.    Results from last 7 days   Lab Units 01/11/25  0256   WBC 10*3/mm3 9.66   HEMOGLOBIN g/dL 12.4*   HEMATOCRIT % 37.2*   PLATELETS 10*3/mm3 196     Results from last 7 days   Lab Units 01/11/25  0256 01/10/25  0948   SODIUM mmol/L 143 138   POTASSIUM mmol/L  3.6 3.5   CHLORIDE mmol/L 105 100   CO2 mmol/L 26.0 27.2   BUN mg/dL 17 19   CREATININE mg/dL 1.12 1.37*   CALCIUM mg/dL 8.7 8.8   BILIRUBIN mg/dL 0.4 0.6   ALK PHOS U/L 71 78   ALT (SGPT) U/L 23 17   AST (SGOT) U/L 20 16   GLUCOSE mg/dL 115* 152*     Imaging Results (Last 24 Hours)       Procedure Component Value Units Date/Time    XR Chest 1 View [[id number]] Collected: 01/10/25 1045     Updated: 01/10/25 1049    Narrative:      XR CHEST 1 VW-     Clinical: Syncope, trauma     COMPARISON 4/26/2023     FINDINGS: The right lung is clear, no vascular congestion or pleural  effusion seen. Very subtle area of infiltrate or elective cyst at the  left lung base. Cardiomediastinal silhouette is stable. No other  interval change has occurred. PA and lateral view of the chest would be  most helpful when patient's condition permits.     This report was finalized on 1/10/2025 10:46 AM by Dr. Vaughn Knox M.D on Workstation: [id number]               I have reviewed the medications.     Discharge Medications        Continue These Medications        Instructions Start Date   Alcohol Swabs pads   Clean area of injection as needed 3 times daily.      OneTouch Delica Lancets 33G misc   Check glucose 3 times daily             ASK your doctor about these medications        Instructions Start Date   amLODIPine 10 MG tablet  Commonly known as: NORVASC   10 mg, Oral, Daily      cephalexin 500 MG capsule  Commonly known as: KEFLEX   500 mg, 3 Times Daily      diclofenac 75 MG EC tablet  Commonly known as: VOLTAREN   TAKE 1 TABLET BY MOUTH DAILY      lisinopril-hydrochlorothiazide 20-12.5 MG per tablet  Commonly known as: PRINZIDE,ZESTORETIC   TAKE 2 TABLETS BY MOUTH EVERY MORNING      metoprolol succinate XL 50 MG 24 hr tablet  Commonly known as: TOPROL-XL   50 mg, Oral, Daily      ondansetron ODT 4 MG disintegrating tablet  Commonly known as: ZOFRAN-ODT   4 mg, Every 8 Hours PRN      OneTouch Ultra test strip  Generic drug: glucose  blood   USE TO TEST FOR BLOOD SUGAR LEVELS ONCE DAILY AS DIRECTED      oxyCODONE-acetaminophen 7.5-325 MG per tablet  Commonly known as: PERCOCET   1 tablet, Every 8 Hours PRN      phenazopyridine 200 MG tablet  Commonly known as: PYRIDIUM   200 mg, 3 Times Daily PRN      rosuvastatin 10 MG tablet  Commonly known as: Crestor   10 mg, Oral, Daily              ---------------------------------------------------------------------------------------------  Assessment & Plan   Assessment/Problem List    Syncope      Plan:    Syncope:  -POD 3 from prostate surgery for BPH; hemoglobin stable  -EKG sinus without any acute changes  -Continuous cardiac monitor  -Troponins negative x 2  -Echo on 12/30/2024 shows EF of 66.7%, grade 1 diastolic dysfunction, and trace mitral valve regurg present  -Orthostatics normal throughout admission  -Cardiology has seen and evaluated the patient and suspects that this is secondary to vasovagal.  EKG reviewed and no significant changes appreciated and patient is okay for discharge home without any further inpatient testing at this time.  Patient will need to follow-up with cardiology in 2 to 4 weeks.     RODNEY, resolved:  -Improved with good p.o. intake     Chronic hypertension  Chronic hyperlipidemia:  -Continue home regimen     EARLINE:  -Okay to use home CPAP or supplemental O2 as needed for sleep     Obesity:  -BMI 29.03, diet and exercise encouraged        VTE Prophylaxis:  Mechanical VTE prophylaxis orders are present.    Disposition: Discharge to home    Follow-up after Discharge: PCP in 1 to 2 weeks, cardiology in 2 to 4 weeks    This note will serve as a discharge summary    Cindy Bhat PA-C 01/11/25 07:21 EST    I have worn appropriate PPE during this patient encounter, sanitized my hands both with entering and exiting patient's room.      39 minutes has been spent by [facility] providers in the care of this patient while under observation  status

## 2025-01-12 ENCOUNTER — APPOINTMENT (OUTPATIENT)
Dept: GENERAL RADIOLOGY | Facility: HOSPITAL | Age: 70
End: 2025-01-12
Payer: MEDICARE

## 2025-01-12 ENCOUNTER — READMISSION MANAGEMENT (OUTPATIENT)
Dept: CALL CENTER | Facility: HOSPITAL | Age: 70
End: 2025-01-12
Payer: MEDICARE

## 2025-01-12 ENCOUNTER — APPOINTMENT (OUTPATIENT)
Dept: MRI IMAGING | Facility: HOSPITAL | Age: 70
End: 2025-01-12
Payer: MEDICARE

## 2025-01-12 ENCOUNTER — HOSPITAL ENCOUNTER (OUTPATIENT)
Facility: HOSPITAL | Age: 70
Setting detail: OBSERVATION
Discharge: HOME | End: 2025-01-13
Attending: EMERGENCY MEDICINE | Admitting: EMERGENCY MEDICINE
Payer: MEDICARE

## 2025-01-12 DIAGNOSIS — M25.562 ACUTE PAIN OF LEFT KNEE: Primary | ICD-10-CM

## 2025-01-12 DIAGNOSIS — S83.92XA SPRAIN OF LEFT KNEE, UNSPECIFIED LIGAMENT, INITIAL ENCOUNTER: ICD-10-CM

## 2025-01-12 DIAGNOSIS — M25.462 EFFUSION OF LEFT KNEE: ICD-10-CM

## 2025-01-12 LAB
ALBUMIN SERPL-MCNC: 3.6 G/DL (ref 3.5–5.2)
ALBUMIN/GLOB SERPL: 1.2 G/DL
ALP SERPL-CCNC: 68 U/L (ref 39–117)
ALT SERPL W P-5'-P-CCNC: 22 U/L (ref 1–41)
ANION GAP SERPL CALCULATED.3IONS-SCNC: 15 MMOL/L (ref 5–15)
AST SERPL-CCNC: 19 U/L (ref 1–40)
BASOPHILS # BLD MANUAL: 0.09 10*3/MM3 (ref 0–0.2)
BASOPHILS NFR BLD MANUAL: 1 % (ref 0–1.5)
BILIRUB SERPL-MCNC: 0.5 MG/DL (ref 0–1.2)
BUN SERPL-MCNC: 14 MG/DL (ref 8–23)
BUN/CREAT SERPL: 15.9 (ref 7–25)
CALCIUM SPEC-SCNC: 9 MG/DL (ref 8.6–10.5)
CHLORIDE SERPL-SCNC: 99 MMOL/L (ref 98–107)
CO2 SERPL-SCNC: 24 MMOL/L (ref 22–29)
CREAT SERPL-MCNC: 0.88 MG/DL (ref 0.76–1.27)
CRP SERPL-MCNC: 18.56 MG/DL (ref 0–0.5)
DEPRECATED RDW RBC AUTO: 46.2 FL (ref 37–54)
EGFRCR SERPLBLD CKD-EPI 2021: 91.9 ML/MIN/1.73
EOSINOPHIL # BLD MANUAL: 0.09 10*3/MM3 (ref 0–0.4)
EOSINOPHIL NFR BLD MANUAL: 1 % (ref 0.3–6.2)
ERYTHROCYTE [DISTWIDTH] IN BLOOD BY AUTOMATED COUNT: 15.6 % (ref 12.3–15.4)
ERYTHROCYTE [SEDIMENTATION RATE] IN BLOOD: 62 MM/HR (ref 0–20)
GLOBULIN UR ELPH-MCNC: 2.9 GM/DL
GLUCOSE SERPL-MCNC: 168 MG/DL (ref 65–99)
HCT VFR BLD AUTO: 36.8 % (ref 37.5–51)
HGB BLD-MCNC: 12.4 G/DL (ref 13–17.7)
LYMPHOCYTES # BLD MANUAL: 1.4 10*3/MM3 (ref 0.7–3.1)
LYMPHOCYTES NFR BLD MANUAL: 18 % (ref 5–12)
MCH RBC QN AUTO: 27.6 PG (ref 26.6–33)
MCHC RBC AUTO-ENTMCNC: 33.7 G/DL (ref 31.5–35.7)
MCV RBC AUTO: 81.8 FL (ref 79–97)
MONOCYTES # BLD: 1.68 10*3/MM3 (ref 0.1–0.9)
NEUTROPHILS # BLD AUTO: 6.08 10*3/MM3 (ref 1.7–7)
NEUTROPHILS NFR BLD MANUAL: 65 % (ref 42.7–76)
NRBC BLD AUTO-RTO: 0 /100 WBC (ref 0–0.2)
PLAT MORPH BLD: NORMAL
PLATELET # BLD AUTO: 205 10*3/MM3 (ref 140–450)
PMV BLD AUTO: 10.2 FL (ref 6–12)
POTASSIUM SERPL-SCNC: 3.6 MMOL/L (ref 3.5–5.2)
PROT SERPL-MCNC: 6.5 G/DL (ref 6–8.5)
RBC # BLD AUTO: 4.5 10*6/MM3 (ref 4.14–5.8)
RBC MORPH BLD: NORMAL
SODIUM SERPL-SCNC: 138 MMOL/L (ref 136–145)
VARIANT LYMPHS NFR BLD MANUAL: 15 % (ref 19.6–45.3)
WBC MORPH BLD: NORMAL
WBC NRBC COR # BLD AUTO: 9.36 10*3/MM3 (ref 3.4–10.8)

## 2025-01-12 PROCEDURE — 86140 C-REACTIVE PROTEIN: CPT | Performed by: PHYSICIAN ASSISTANT

## 2025-01-12 PROCEDURE — 71045 X-RAY EXAM CHEST 1 VIEW: CPT

## 2025-01-12 PROCEDURE — 87040 BLOOD CULTURE FOR BACTERIA: CPT | Performed by: PHYSICIAN ASSISTANT

## 2025-01-12 PROCEDURE — 83605 ASSAY OF LACTIC ACID: CPT | Performed by: PHYSICIAN ASSISTANT

## 2025-01-12 PROCEDURE — G0378 HOSPITAL OBSERVATION PER HR: HCPCS

## 2025-01-12 PROCEDURE — 73562 X-RAY EXAM OF KNEE 3: CPT

## 2025-01-12 PROCEDURE — 99285 EMERGENCY DEPT VISIT HI MDM: CPT

## 2025-01-12 PROCEDURE — 85652 RBC SED RATE AUTOMATED: CPT | Performed by: PHYSICIAN ASSISTANT

## 2025-01-12 PROCEDURE — 25810000003 SODIUM CHLORIDE 0.9 % SOLUTION: Performed by: PHYSICIAN ASSISTANT

## 2025-01-12 PROCEDURE — 73721 MRI JNT OF LWR EXTRE W/O DYE: CPT

## 2025-01-12 PROCEDURE — 25010000002 CEFTRIAXONE PER 250 MG: Performed by: PHYSICIAN ASSISTANT

## 2025-01-12 PROCEDURE — 85025 COMPLETE CBC W/AUTO DIFF WBC: CPT | Performed by: NURSE PRACTITIONER

## 2025-01-12 PROCEDURE — 85007 BL SMEAR W/DIFF WBC COUNT: CPT | Performed by: NURSE PRACTITIONER

## 2025-01-12 PROCEDURE — 80053 COMPREHEN METABOLIC PANEL: CPT | Performed by: NURSE PRACTITIONER

## 2025-01-12 RX ORDER — BISACODYL 5 MG/1
5 TABLET, DELAYED RELEASE ORAL DAILY PRN
Status: DISCONTINUED | OUTPATIENT
Start: 2025-01-12 | End: 2025-01-13 | Stop reason: HOSPADM

## 2025-01-12 RX ORDER — POLYETHYLENE GLYCOL 3350 17 G/17G
17 POWDER, FOR SOLUTION ORAL DAILY PRN
Status: DISCONTINUED | OUTPATIENT
Start: 2025-01-12 | End: 2025-01-13 | Stop reason: HOSPADM

## 2025-01-12 RX ORDER — AMLODIPINE BESYLATE 10 MG/1
10 TABLET ORAL DAILY
Status: DISCONTINUED | OUTPATIENT
Start: 2025-01-12 | End: 2025-01-13 | Stop reason: HOSPADM

## 2025-01-12 RX ORDER — SODIUM CHLORIDE 0.9 % (FLUSH) 0.9 %
10 SYRINGE (ML) INJECTION EVERY 12 HOURS SCHEDULED
Status: DISCONTINUED | OUTPATIENT
Start: 2025-01-12 | End: 2025-01-13 | Stop reason: HOSPADM

## 2025-01-12 RX ORDER — LISINOPRIL 20 MG/1
20 TABLET ORAL
Status: DISCONTINUED | OUTPATIENT
Start: 2025-01-13 | End: 2025-01-13 | Stop reason: HOSPADM

## 2025-01-12 RX ORDER — VANCOMYCIN 2 GRAM/500 ML IN 0.9 % SODIUM CHLORIDE INTRAVENOUS
20 ONCE
Status: DISCONTINUED | OUTPATIENT
Start: 2025-01-12 | End: 2025-01-12

## 2025-01-12 RX ORDER — METOPROLOL SUCCINATE 50 MG/1
50 TABLET, EXTENDED RELEASE ORAL DAILY
Status: DISCONTINUED | OUTPATIENT
Start: 2025-01-12 | End: 2025-01-13 | Stop reason: HOSPADM

## 2025-01-12 RX ORDER — ACETAMINOPHEN 325 MG/1
650 TABLET ORAL EVERY 6 HOURS PRN
Status: DISCONTINUED | OUTPATIENT
Start: 2025-01-12 | End: 2025-01-13 | Stop reason: HOSPADM

## 2025-01-12 RX ORDER — OXYCODONE AND ACETAMINOPHEN 5; 325 MG/1; MG/1
1 TABLET ORAL EVERY 6 HOURS PRN
Status: DISCONTINUED | OUTPATIENT
Start: 2025-01-12 | End: 2025-01-13 | Stop reason: HOSPADM

## 2025-01-12 RX ORDER — SODIUM CHLORIDE 9 MG/ML
40 INJECTION, SOLUTION INTRAVENOUS AS NEEDED
Status: DISCONTINUED | OUTPATIENT
Start: 2025-01-12 | End: 2025-01-13 | Stop reason: HOSPADM

## 2025-01-12 RX ORDER — BISACODYL 10 MG
10 SUPPOSITORY, RECTAL RECTAL DAILY PRN
Status: DISCONTINUED | OUTPATIENT
Start: 2025-01-12 | End: 2025-01-13 | Stop reason: HOSPADM

## 2025-01-12 RX ORDER — AMOXICILLIN 250 MG
2 CAPSULE ORAL 2 TIMES DAILY PRN
Status: DISCONTINUED | OUTPATIENT
Start: 2025-01-12 | End: 2025-01-13 | Stop reason: HOSPADM

## 2025-01-12 RX ORDER — CEPHALEXIN 500 MG/1
500 CAPSULE ORAL 3 TIMES DAILY
Status: DISCONTINUED | OUTPATIENT
Start: 2025-01-12 | End: 2025-01-12

## 2025-01-12 RX ORDER — ROSUVASTATIN CALCIUM 20 MG/1
10 TABLET, COATED ORAL 3 TIMES WEEKLY
Status: DISCONTINUED | OUTPATIENT
Start: 2025-01-13 | End: 2025-01-13 | Stop reason: HOSPADM

## 2025-01-12 RX ORDER — SODIUM CHLORIDE 0.9 % (FLUSH) 0.9 %
10 SYRINGE (ML) INJECTION AS NEEDED
Status: DISCONTINUED | OUTPATIENT
Start: 2025-01-12 | End: 2025-01-13 | Stop reason: HOSPADM

## 2025-01-12 RX ORDER — POTASSIUM CHLORIDE 750 MG/1
40 TABLET, FILM COATED, EXTENDED RELEASE ORAL EVERY 4 HOURS
Status: COMPLETED | OUTPATIENT
Start: 2025-01-12 | End: 2025-01-13

## 2025-01-12 RX ORDER — HYDROCHLOROTHIAZIDE 12.5 MG/1
12.5 TABLET ORAL
Status: DISCONTINUED | OUTPATIENT
Start: 2025-01-13 | End: 2025-01-13 | Stop reason: HOSPADM

## 2025-01-12 RX ORDER — OXYCODONE AND ACETAMINOPHEN 5; 325 MG/1; MG/1
1 TABLET ORAL ONCE
Status: COMPLETED | OUTPATIENT
Start: 2025-01-12 | End: 2025-01-12

## 2025-01-12 RX ADMIN — OXYCODONE AND ACETAMINOPHEN 1 TABLET: 5; 325 TABLET ORAL at 20:27

## 2025-01-12 RX ADMIN — AMLODIPINE BESYLATE 10 MG: 10 TABLET ORAL at 20:27

## 2025-01-12 RX ADMIN — OXYCODONE HYDROCHLORIDE AND ACETAMINOPHEN 1 TABLET: 5; 325 TABLET ORAL at 14:35

## 2025-01-12 RX ADMIN — CEPHALEXIN 500 MG: 500 CAPSULE ORAL at 20:26

## 2025-01-12 RX ADMIN — ACETAMINOPHEN 650 MG: 325 TABLET ORAL at 21:50

## 2025-01-12 RX ADMIN — CEFTRIAXONE 2000 MG: 2 INJECTION, POWDER, FOR SOLUTION INTRAMUSCULAR; INTRAVENOUS at 22:32

## 2025-01-12 RX ADMIN — POTASSIUM CHLORIDE 40 MEQ: 750 TABLET, EXTENDED RELEASE ORAL at 22:20

## 2025-01-12 RX ADMIN — SODIUM CHLORIDE 1000 ML: 9 INJECTION, SOLUTION INTRAVENOUS at 22:33

## 2025-01-12 RX ADMIN — Medication 10 ML: at 20:28

## 2025-01-12 RX ADMIN — METOPROLOL SUCCINATE 50 MG: 50 TABLET, EXTENDED RELEASE ORAL at 20:27

## 2025-01-12 NOTE — PLAN OF CARE
Goal Outcome Evaluation:   Pt admitted for left knee pain/swelling. Ortho consult. Mri pending. VSS. Pt mobility is significantly impaired due to pain. Family at bedside

## 2025-01-12 NOTE — ED NOTES
Pt had prostate surgery Wednesday. Pt c/o L knee pain. Pt thinks he ay have injured it when he passed out Friday. Pt was seen for syncopal episode and stayed in obs, pt d/c yesterday.  Pt c/o swelling in L knee. Pt not on thinners.

## 2025-01-12 NOTE — ED PROVIDER NOTES
EMERGENCY DEPARTMENT ENCOUNTER    Room Number:  127/1  PCP: Erik Benitez MD  Historian: Patient, family    I initially evaluated the patient at 1411    HPI:  Chief Complaint: Left knee injury  A complete HPI/ROS/PMH/PSH/SH/FH are unobtainable due to: Nothing  Context: Jacob Miller is a 69 y.o. male with a medical history of hyperlipidemia, hypertension who presents to the ED c/o acute left knee injury.  Patient had a syncopal episode 2 days ago.  When he passed out, he injured his left knee.  He is unsure if he landed on his knee or if it twisted underneath him.  He was seen here in the ED and admitted to the observation unit.  He was seen by cardiology and symptoms were felt to be vasovagal.  He was discharged yesterday.  His left knee pain has been worse since he got home.  Pain was severe today and he was unable to bear weight or ambulate.  He has taken Tylenol without relief.  He has a history of bilateral knee arthritis.  Denies chest pain, shortness of breath, or numbness/tingling/weakness in his extremities.  He is not on anticoagulants.  He can normally ambulate without assistance.            PAST MEDICAL HISTORY  Active Ambulatory Problems     Diagnosis Date Noted    Benign essential hypertension 06/24/2016    Hypercholesterolemia 06/24/2016    Obstructive sleep apnea 06/24/2016    Hyperglycemia 06/24/2016    Actinic keratosis 06/24/2016    Benign non-nodular prostatic hyperplasia without lower urinary tract symptoms 06/20/2017    Osteoarthritis of both knees 01/14/2018    Palpitations 07/26/2018    Biceps tendinitis on left 07/26/2018    Overweight (BMI 25.0-29.9) 03/10/2019    Overactive bladder 09/17/2020    Other male erectile dysfunction 10/28/2021    Encounter for subsequent annual wellness visit in Medicare patient 05/24/2022    I will check 08/10/2022    Elevated PSA 04/21/2023    Viral pharyngitis 02/15/2024    Syncope 01/10/2025     Resolved Ambulatory Problems     Diagnosis Date Noted     Well adult exam 06/24/2016     Past Medical History:   Diagnosis Date    Hyperlipidemia     Hypertension     Kidney stones     PAC (premature atrial contraction)     Sleep apnea          PAST SURGICAL HISTORY  Past Surgical History:   Procedure Laterality Date    BELPHAROPTOSIS REPAIR Bilateral     HERNIA REPAIR      ROTATOR CUFF REPAIR           FAMILY HISTORY  Family History   Problem Relation Age of Onset    Heart disease Mother     Hypertension Mother     Diabetes Mother     Heart disease Father     Arrhythmia Brother          SOCIAL HISTORY  Social History     Socioeconomic History    Marital status:    Tobacco Use    Smoking status: Never     Passive exposure: Never    Smokeless tobacco: Never    Tobacco comments:     Daily caffeine use   Vaping Use    Vaping status: Never Used   Substance and Sexual Activity    Alcohol use: Yes     Comment: occ    Drug use: No    Sexual activity: Defer         ALLERGIES  Patient has no known allergies.    REVIEW OF SYSTEMS  Review of Systems  Included in HPI  All systems reviewed and negative except for those discussed in HPI.      PHYSICAL EXAM  ED Triage Vitals [01/12/25 1334]   Temp Heart Rate Resp BP SpO2   100.3 °F (37.9 °C) 77 18 -- 98 %      Temp src Heart Rate Source Patient Position BP Location FiO2 (%)   Tympanic Monitor -- -- --       Physical Exam      GENERAL: Awake, alert, oriented x 3.  Well-developed, well-nourished and nontoxic-appearing male.    HENT: NCAT, nares patent  EYES: no scleral icterus  CV: regular rhythm, normal rate, equal bilateral pedal pulses  RESPIRATORY: normal effort, clear to auscultation bilaterally  ABDOMEN: soft  MUSCULOSKELETAL: There is diffuse swelling over the anterior aspect of the left knee.  There is tenderness over the superior left patella.  There is decreased range of motion of the left knee secondary to pain.  Remainder of the left lower extremity is nontender  NEURO: Normal strength and light touch sensation in the  left lower extremity  PSYCH:  calm, cooperative  SKIN: warm, dry    Vital signs and nursing notes reviewed.          LAB RESULTS  Recent Results (from the past 24 hours)   CBC Auto Differential    Collection Time: 01/12/25  4:35 PM    Specimen: Arm, Right; Blood   Result Value Ref Range    WBC 9.36 3.40 - 10.80 10*3/mm3    RBC 4.50 4.14 - 5.80 10*6/mm3    Hemoglobin 12.4 (L) 13.0 - 17.7 g/dL    Hematocrit 36.8 (L) 37.5 - 51.0 %    MCV 81.8 79.0 - 97.0 fL    MCH 27.6 26.6 - 33.0 pg    MCHC 33.7 31.5 - 35.7 g/dL    RDW 15.6 (H) 12.3 - 15.4 %    RDW-SD 46.2 37.0 - 54.0 fl    MPV 10.2 6.0 - 12.0 fL    Platelets 205 140 - 450 10*3/mm3    nRBC 0.0 0.0 - 0.2 /100 WBC   Comprehensive Metabolic Panel    Collection Time: 01/12/25  4:35 PM    Specimen: Arm, Right; Blood   Result Value Ref Range    Glucose 168 (H) 65 - 99 mg/dL    BUN 14 8 - 23 mg/dL    Creatinine 0.88 0.76 - 1.27 mg/dL    Sodium 138 136 - 145 mmol/L    Potassium 3.6 3.5 - 5.2 mmol/L    Chloride 99 98 - 107 mmol/L    CO2 24.0 22.0 - 29.0 mmol/L    Calcium 9.0 8.6 - 10.5 mg/dL    Total Protein 6.5 6.0 - 8.5 g/dL    Albumin 3.6 3.5 - 5.2 g/dL    ALT (SGPT) 22 1 - 41 U/L    AST (SGOT) 19 1 - 40 U/L    Alkaline Phosphatase 68 39 - 117 U/L    Total Bilirubin 0.5 0.0 - 1.2 mg/dL    Globulin 2.9 gm/dL    A/G Ratio 1.2 g/dL    BUN/Creatinine Ratio 15.9 7.0 - 25.0    Anion Gap 15.0 5.0 - 15.0 mmol/L    eGFR 91.9 >60.0 mL/min/1.73   Manual Differential    Collection Time: 01/12/25  4:35 PM    Specimen: Arm, Right; Blood   Result Value Ref Range    Neutrophil % 65.0 42.7 - 76.0 %    Lymphocyte % 15.0 (L) 19.6 - 45.3 %    Monocyte % 18.0 (H) 5.0 - 12.0 %    Eosinophil % 1.0 0.3 - 6.2 %    Basophil % 1.0 0.0 - 1.5 %    Neutrophils Absolute 6.08 1.70 - 7.00 10*3/mm3    Lymphocytes Absolute 1.40 0.70 - 3.10 10*3/mm3    Monocytes Absolute 1.68 (H) 0.10 - 0.90 10*3/mm3    Eosinophils Absolute 0.09 0.00 - 0.40 10*3/mm3    Basophils Absolute 0.09 0.00 - 0.20 10*3/mm3    RBC  Morphology Normal Normal    WBC Morphology Normal Normal    Platelet Morphology Normal Normal       Ordered the above labs and reviewed the results.        RADIOLOGY  MRI Knee Left Without Contrast    Result Date: 1/12/2025  MRI LEFT KNEE WITHOUT CONTRAST  HISTORY: Acute left knee injury. Syncopal episode 2 days ago.  TECHNIQUE:  MRI left knee includes axial and coronal PD fat-sat as well as sagittal PD, T1, T2-weighted sequences.  COMPARISON: Left knee x-rays 01/12/2025.  FINDINGS: There is a complex tear of the posterior horn medial meniscus. Multidirectional tear is present associated with volume loss and tear extends into the outer third of the meniscus. The degree of volume loss is greatest at the posterior horn/body junction. Anterior horn medial meniscus and the lateral meniscus are intact.  Cruciate ligaments, collateral ligament complexes, extensor mechanism are intact. There is articular cartilage thinning and surface irregularity at the central to posterior aspect of the medial femoral condyle. Lateral compartment articular cartilage is mostly preserved. There is advanced patellofemoral arthritis with full-thickness cartilage loss at the median ridge and lateral facet of the patella and lateral trochlea with mild subchondral edema. No fracture is evident. There is a large knee joint effusion with synovial thickening/synovitis. Mild periarticular muscular soft tissue edema. Proximal tibiofibular joint appears normal. No fracture is evident.      1. Large knee joint effusion with synovial thickening/synovitis. 2. Complex tear of the posterior horn and body medial meniscus with volume loss and most severely involving the posterior horn/body junction. 3. Advanced patellofemoral compartment arthritis with full-thickness cartilage loss at the lateral aspect of the patellofemoral compartment. Moderate lateral compartment osteoarthritis with cartilage loss particularly at the central to posterior aspect of the  lateral femoral condyle.       XR Knee 3 View Left    Result Date: 1/12/2025  X-RAY KNEE THREE-VIEW LEFT  HISTORY: Fall with left knee pain.  COMPARISON: None.  FINDINGS: There is a very large knee joint effusion and suspected hemarthrosis in the trauma setting. There appear to be osteochondral bodies projecting posterior to the distal femur and the knee joint line on lateral view. There is prominent patellar spur formation.  There is also calcification associated with the posterior margin of the quadriceps tendon insertion. Prominent enthesophyte is present at the origin of the patellar tendon. No fracture plane is evident.      1. Large effusion and suspected hemarthrosis. Further evaluation for internal derangement could be performed with MRI. 2. Suspect chronic osteochondral bodies posterior to the distal femur and the knee joint line on the lateral view. Prominent patellar spur formation and enthesophyte formation.  This report was finalized on 1/12/2025 3:40 PM by Artur Johnson M.D on Workstation: [id number]       Ordered the above noted radiological studies. Reviewed by me in PACS.            PROCEDURES  Procedures        OUTPATIENT MEDICATION MANAGEMENT:  Current Facility-Administered Medications Ordered in Epic   Medication Dose Route Frequency Provider Last Rate Last Admin    acetaminophen (TYLENOL) tablet 650 mg  650 mg Oral Q6H PRN Blevens, Kirsten C, APRN        amLODIPine (NORVASC) tablet 10 mg  10 mg Oral Daily Blevens, Kirsten C, APRN        sennosides-docusate (PERICOLACE) 8.6-50 MG per tablet 2 tablet  2 tablet Oral BID PRN Blevens, Kirsten C, APRN        And    polyethylene glycol (MIRALAX) packet 17 g  17 g Oral Daily PRN Blevens, Kirsten C, APRN        And    bisacodyl (DULCOLAX) EC tablet 5 mg  5 mg Oral Daily PRN Blevens, Kirsten C, APRN        And    bisacodyl (DULCOLAX) suppository 10 mg  10 mg Rectal Daily PRN Blevens, Kirsten C, APRN        Calcium Replacement - Follow Nurse / BPA Driven Protocol   Not  Applicable PRN Blewilliams, Kirsten C, APRN        cephalexin (KEFLEX) capsule 500 mg  500 mg Oral TID Silvia Ruedaa C, APRN        [START ON 1/13/2025] lisinopril (PRINIVIL,ZESTRIL) tablet 20 mg  20 mg Oral Q24H Bleafshan Kirsten RONA, APRN        And    [START ON 1/13/2025] hydroCHLOROthiazide tablet 12.5 mg  12.5 mg Oral Q24H Bleafshan Kirsten RONA, APRN        Magnesium Cardiology Dose Replacement - Follow Nurse / BPA Driven Protocol   Not Applicable PRN Blevens, Kirsten C, APRN        metoprolol succinate XL (TOPROL-XL) 24 hr tablet 50 mg  50 mg Oral Daily Kirsten Rueda, APRN        oxyCODONE-acetaminophen (PERCOCET) 5-325 MG per tablet 1 tablet  1 tablet Oral Q6H PRN Joce Licona MD        Phosphorus Replacement - Follow Nurse / BPA Driven Protocol   Not Applicable PRN Bleafshan Kirsten C, APRN        Potassium Replacement - Follow Nurse / BPA Driven Protocol   Not Applicable PRN Marybeth Kirsten C, APRN        [START ON 1/13/2025] rosuvastatin (CRESTOR) tablet 10 mg  10 mg Oral Once per day on Monday Wednesday Friday Marybeth Kirsten C, APRN        sodium chloride 0.9 % flush 10 mL  10 mL Intravenous Q12H Blewilliams Kirsten C, APRN        sodium chloride 0.9 % flush 10 mL  10 mL Intravenous PRN Bleafshan Kirsten C, APRN        sodium chloride 0.9 % infusion 40 mL  40 mL Intravenous PRN Marybeth Kirsten RONA, APRN         No current Epic-ordered outpatient medications on file.           MEDICATIONS GIVEN IN ER  Medications   sodium chloride 0.9 % flush 10 mL (has no administration in time range)   sodium chloride 0.9 % flush 10 mL (has no administration in time range)   sodium chloride 0.9 % infusion 40 mL (has no administration in time range)   Potassium Replacement - Follow Nurse / BPA Driven Protocol (has no administration in time range)   Magnesium Cardiology Dose Replacement - Follow Nurse / BPA Driven Protocol (has no administration in time range)   Phosphorus Replacement - Follow Nurse / BPA Driven Protocol (has no administration in time  range)   Calcium Replacement - Follow Nurse / BPA Driven Protocol (has no administration in time range)   sennosides-docusate (PERICOLACE) 8.6-50 MG per tablet 2 tablet (has no administration in time range)     And   polyethylene glycol (MIRALAX) packet 17 g (has no administration in time range)     And   bisacodyl (DULCOLAX) EC tablet 5 mg (has no administration in time range)     And   bisacodyl (DULCOLAX) suppository 10 mg (has no administration in time range)   rosuvastatin (CRESTOR) tablet 10 mg (has no administration in time range)   lisinopril (PRINIVIL,ZESTRIL) tablet 20 mg (has no administration in time range)     And   hydroCHLOROthiazide tablet 12.5 mg (has no administration in time range)   metoprolol succinate XL (TOPROL-XL) 24 hr tablet 50 mg (has no administration in time range)   amLODIPine (NORVASC) tablet 10 mg (has no administration in time range)   cephalexin (KEFLEX) capsule 500 mg (has no administration in time range)   oxyCODONE-acetaminophen (PERCOCET) 5-325 MG per tablet 1 tablet (has no administration in time range)   acetaminophen (TYLENOL) tablet 650 mg (has no administration in time range)   oxyCODONE-acetaminophen (PERCOCET) 5-325 MG per tablet 1 tablet (1 tablet Oral Given 1/12/25 1435)                   MEDICAL DECISION MAKING, PROGRESS, and CONSULTS    All labs have been independently reviewed by me.  All radiology studies have been reviewed by me and I have also reviewed the radiology report.   EKG's independently viewed and interpreted by me.  Discussion below represents my analysis of pertinent findings related to patient's condition, differential diagnosis, treatment plan and final disposition.      Additional sources:    - Discussed/ obtained information from independent historians: Family at bedside    - External (non-ED) record review: Patient was admitted here 1/10 through 1/11/2025 for syncope.  Seen by cardiology.  Symptoms were felt to be vasovagal.    -Prescription drug  monitoring program review:     N/A    - Chronic or social conditions impacting patient care (Social Determinants of Health): None          Orders placed during this visit:  Orders Placed This Encounter   Procedures    XR Knee 3 View Left    MRI Knee Left Without Contrast    CBC Auto Differential    Comprehensive Metabolic Panel    Manual Differential    Diet: Regular/House; Fluid Consistency: Thin (IDDSI 0)    Vital Signs    Intake & Output    Weigh Patient    Oral Care    Saline Lock & Maintain IV Access    Code Status and Medical Interventions: CPR (Attempt to Resuscitate); Full Support    Inpatient Orthopedic Surgery Consult    Insert Peripheral IV    Initiate ED Observation Status         Additional orders considered but not ordered:            Differential diagnosis includes, but is not limited to:    Knee sprain, knee contusion, knee fracture, internal derangement of knee      Independent interpretation of labs, radiology studies, and discussions with consultants:  ED Course as of 01/12/25 1835   Sun Jan 12, 2025   1427 Temp: 98.6 °F (37 °C) [WH]   1459 Left knee x-rays personally interpreted by me.  My personal interpretation is: No fracture.  No dislocation.  There is a joint effusion.  Per the radiologist, there is a large effusion and suspected hemarthrosis.  There are suspected chronic osteochondral bodies posterior to the distal femur and the knee joint line on the lateral view.  There patellar spur formation [WH]   1528 X-ray results and diagnosis were discussed with the patient and his family.  He required the assistance of 3 people to get from the wheelchair into the stretcher earlier.  Shared decision making was discussed and admission was recommended.  He is agreeable with this. [WH]   1531 Case discussed with KRYSTIAN Curtis, and she agrees to admit the patient to Dr. Licona.  Pertinent history, exam findings, test results, and diagnoses were discussed with her. [WH]   1540 MDM: Patient presented to the  ED complaining of acute left knee pain and swelling.  He injured it after passing out 2 days ago.  X-ray shows a large effusion but no fracture.  Patient was unable to stand and ambulate even with assistance.  I am concerned about the possibility of an internal derangement of the knee.  He will be admitted for further workup including orthopedic consultation and MRI of the knee. [WH]      ED Course User Index  [WH] Chandra Muller MD         COMPLEXITY OF CARE  The patient requires admission.      DIAGNOSIS  Final diagnoses:   Acute pain of left knee   Effusion of left knee   Sprain of left knee, unspecified ligament, initial encounter         DISPOSITION  ADMISSION    Discussed treatment plan and reason for admission with pt/family and admitting physician.  Pt/family voiced understanding of the plan for admission for further testing/treatment as needed.               Latest Documented Vital Signs:  AS OF 18:35 EST VITALS:    BP - 142/64  HR - 70  TEMP - 98 °F (36.7 °C) (Oral)  O2 SATS - 98%            --    Please note that portions of this were completed with a voice recognition program.       Note Disclaimer: At [facility], we believe that sharing information builds trust and better relationships. You are receiving this note because you are receiving care at [facility] or recently visited. It is possible you will see health information before a provider has talked with you about it. This kind of information can be easy to misunderstand. To help you fully understand what it means for your health, we urge you to discuss this note with your provider.             Chandra Muller MD  01/12/25 7907

## 2025-01-12 NOTE — H&P
[facility]   HISTORY AND PHYSICAL    Patient Name: Jacob Miller  : 1955  MRN: [id number]  Primary Care Physician:  Erik Benitez MD  Date of admission: 2025    Subjective   Subjective     Chief Complaint:   Chief Complaint   Patient presents with    Knee Injury     HPI:    Jacob Miller is a 69 y.o. male with a past medical history significant for hypertension, hyperlipidemia, palpitations, BPH, EARLINE who presented to the emergency department with complaints of left knee pain.  He had a syncopal episode on 1/10 and it was admitted to the ED observation unit and had an unremarkable cardiac workup and was sent home 111.  He had prostate surgery on 2025 and the episode was felt to be vasovagal.  He says he had mild knee pain after the fall but it became more intense today.  Denies any chest pain or dyspnea.  No recent illness, fever, chills.    He had an x-ray in the emergency department which showed large effusion and suspected hemarthrosis.  MRI was recommended.    He will be admitted to the ED observation unit.  We will obtain MRI left knee.  Orthopedic surgery is consulted.    Review of Systems   All systems were reviewed and negative except for: Those mentioned in HPI    Personal History     Past Medical History:   Diagnosis Date    Hyperlipidemia     Hypertension     Kidney stones     PAC (premature atrial contraction)     Palpitations     Sleep apnea     USES C-PAP MACHINE       Past Surgical History:   Procedure Laterality Date    BELPHAROPTOSIS REPAIR Bilateral     HERNIA REPAIR      ROTATOR CUFF REPAIR         Family History: family history includes Arrhythmia in his brother; Diabetes in his mother; Heart disease in his father and mother; Hypertension in his mother. Otherwise pertinent FHx was reviewed and not pertinent to current issue.    Social History:  reports that he has never smoked. He has never been exposed to tobacco smoke. He has never used smokeless tobacco. He  reports current alcohol use. He reports that he does not use drugs.    Home Medications:  Alcohol Swabs, OneTouch Delica Lancets 33G, amLODIPine, cephalexin, diclofenac, glucose blood, lisinopril-hydrochlorothiazide, metoprolol succinate XL, ondansetron ODT, oxyCODONE-acetaminophen, phenazopyridine, and rosuvastatin    Allergies:  No Known Allergies    Objective   Objective     Vitals:   Temp:  [98 °F (36.7 °C)-100.3 °F (37.9 °C)] 98 °F (36.7 °C)  Heart Rate:  [70-77] 70  Resp:  [18] 18  BP: (139-142)/(64-68) 142/64  Flow (L/min) (Oxygen Therapy):  [2] 2  Physical Exam    Constitutional: Awake, alert   Eyes: PERRLA, sclerae anicteric, no conjunctival injection   HENT: NCAT, mucous membranes moist   Neck: Supple, no thyromegaly, no lymphadenopathy, trachea midline   Respiratory: Clear to auscultation bilaterally, nonlabored respirations    Cardiovascular: RRR, no murmurs, rubs, or gallops, palpable pedal pulses bilaterally   Gastrointestinal: Positive bowel sounds, soft, nontender, nondistended   Musculoskeletal: No bilateral ankle edema, no clubbing or cyanosis to extremities, + swelling left knee   Psychiatric: Appropriate affect, cooperative   Neurologic: Oriented x 3, strength symmetric in all extremities, Cranial Nerves grossly intact to confrontation, speech clear   Skin: No rashes     Result Review    Result Review:  I have personally reviewed the results from the time of this admission to 1/12/2025 18:11 EST and agree with these findings:  []  Laboratory list / accordion  []  Microbiology  [x]  Radiology  []  EKG/Telemetry   []  Cardiology/Vascular   []  Pathology  []  Old records  []  Other:  Most notable findings include: X-ray shows large effusion and suspected hemarthrosis left knee      Assessment & Plan   Assessment / Plan     Brief Patient Summary:  Jacob Miller is a 69 y.o. male who will be admitted to the ED observation unit for further management due to left knee pain.  Large effusion and  suspected hemarthrosis noted on x-ray.  We will obtain MRI imaging and consult orthopedic surgery.    Active Hospital Problems:  Active Hospital Problems    Diagnosis     **Left knee pain      Plan:     Left knee pain, recent fall  Left knee effusion  -X-ray shows large effusion and suspected hemarthrosis  -Obtain MRI  -Consult orthopedic surgery  -Obtain basic labs  -Analgesics as needed    Hypertension  -Continue amlodipine, lisinopril/HCTZ, metoprolol    Hyperlipidemia  -Continue statin    EARLINE  -Continue with home CPAP    Prostate surgery 1/8/2025  -Continue cephalexin    VTE Prophylaxis:  Mechanical VTE prophylaxis orders are present.    CODE STATUS:       Admission Status:  I believe this patient meets observation status.    Electronically signed by ROB Frances, 01/12/25, 6:11 PM EST.    75 minutes has been spent by [facility] providers in the care of this patient while under observation status    I have worn appropriate PPE during this patient encounter, sanitized my hands both with entering and exiting patient's room.    I have discussed plan of care with patient including advance care plan and/or surrogate decision maker.  Patient advises that their wife, Lorie will be their primary surrogate decision maker

## 2025-01-13 ENCOUNTER — APPOINTMENT (OUTPATIENT)
Dept: GENERAL RADIOLOGY | Facility: HOSPITAL | Age: 70
End: 2025-01-13
Payer: MEDICARE

## 2025-01-13 ENCOUNTER — READMISSION MANAGEMENT (OUTPATIENT)
Dept: CALL CENTER | Facility: HOSPITAL | Age: 70
End: 2025-01-13
Payer: MEDICARE

## 2025-01-13 VITALS
RESPIRATION RATE: 18 BRPM | WEIGHT: 220.02 LBS | BODY MASS INDEX: 29.16 KG/M2 | HEIGHT: 73 IN | SYSTOLIC BLOOD PRESSURE: 128 MMHG | HEART RATE: 80 BPM | OXYGEN SATURATION: 92 % | TEMPERATURE: 97.6 F | DIASTOLIC BLOOD PRESSURE: 70 MMHG

## 2025-01-13 LAB
ANION GAP SERPL CALCULATED.3IONS-SCNC: 12 MMOL/L (ref 5–15)
APPEARANCE FLD: ABNORMAL
APTT PPP: 37.3 SECONDS (ref 22.7–35.4)
BACTERIA UR QL AUTO: ABNORMAL /HPF
BILIRUB UR QL STRIP: NEGATIVE
BUN SERPL-MCNC: 15 MG/DL (ref 8–23)
BUN/CREAT SERPL: 15.6 (ref 7–25)
CALCIUM SPEC-SCNC: 8.8 MG/DL (ref 8.6–10.5)
CHLORIDE SERPL-SCNC: 103 MMOL/L (ref 98–107)
CLARITY UR: CLEAR
CO2 SERPL-SCNC: 25 MMOL/L (ref 22–29)
COLOR FLD: ABNORMAL
COLOR UR: ABNORMAL
CREAT SERPL-MCNC: 0.96 MG/DL (ref 0.76–1.27)
D-LACTATE SERPL-SCNC: 0.9 MMOL/L (ref 0.5–2)
DEPRECATED RDW RBC AUTO: 47.1 FL (ref 37–54)
EGFRCR SERPLBLD CKD-EPI 2021: 84.5 ML/MIN/1.73
ERYTHROCYTE [DISTWIDTH] IN BLOOD BY AUTOMATED COUNT: 15.7 % (ref 12.3–15.4)
GLUCOSE SERPL-MCNC: 105 MG/DL (ref 65–99)
GLUCOSE UR STRIP-MCNC: NEGATIVE MG/DL
HCT VFR BLD AUTO: 38.4 % (ref 37.5–51)
HGB BLD-MCNC: 12.3 G/DL (ref 13–17.7)
HGB UR QL STRIP.AUTO: ABNORMAL
HYALINE CASTS UR QL AUTO: ABNORMAL /LPF
INR PPP: 1.26 (ref 0.9–1.1)
KETONES UR QL STRIP: NEGATIVE
LEUKOCYTE ESTERASE UR QL STRIP.AUTO: ABNORMAL
MCH RBC QN AUTO: 26.7 PG (ref 26.6–33)
MCHC RBC AUTO-ENTMCNC: 32 G/DL (ref 31.5–35.7)
MCV RBC AUTO: 83.5 FL (ref 79–97)
MONOS+MACROS NFR FLD: 4 %
MRSA DNA SPEC QL NAA+PROBE: NORMAL
NEUTROPHILS NFR FLD MANUAL: 96 %
NITRITE UR QL STRIP: NEGATIVE
NUC CELL # FLD: ABNORMAL /MM3
PH UR STRIP.AUTO: 6.5 [PH] (ref 5–8)
PLATELET # BLD AUTO: 226 10*3/MM3 (ref 140–450)
PMV BLD AUTO: 10.6 FL (ref 6–12)
POTASSIUM SERPL-SCNC: 3.8 MMOL/L (ref 3.5–5.2)
POTASSIUM SERPL-SCNC: 3.8 MMOL/L (ref 3.5–5.2)
PROT UR QL STRIP: ABNORMAL
PROTHROMBIN TIME: 16 SECONDS (ref 11.7–14.2)
RBC # BLD AUTO: 4.6 10*6/MM3 (ref 4.14–5.8)
RBC # FLD AUTO: ABNORMAL /MM3
RBC # UR STRIP: ABNORMAL /HPF
REF LAB TEST METHOD: ABNORMAL
SODIUM SERPL-SCNC: 140 MMOL/L (ref 136–145)
SP GR UR STRIP: 1.02 (ref 1–1.03)
SQUAMOUS #/AREA URNS HPF: ABNORMAL /HPF
UROBILINOGEN UR QL STRIP: ABNORMAL
WBC # UR STRIP: ABNORMAL /HPF
WBC NRBC COR # BLD AUTO: 9.04 10*3/MM3 (ref 3.4–10.8)

## 2025-01-13 PROCEDURE — 85027 COMPLETE CBC AUTOMATED: CPT | Performed by: PHYSICIAN ASSISTANT

## 2025-01-13 PROCEDURE — 89051 BODY FLUID CELL COUNT: CPT | Performed by: NURSE PRACTITIONER

## 2025-01-13 PROCEDURE — 87086 URINE CULTURE/COLONY COUNT: CPT | Performed by: NURSE PRACTITIONER

## 2025-01-13 PROCEDURE — 87205 SMEAR GRAM STAIN: CPT | Performed by: NURSE PRACTITIONER

## 2025-01-13 PROCEDURE — 71046 X-RAY EXAM CHEST 2 VIEWS: CPT

## 2025-01-13 PROCEDURE — 25010000002 LIDOCAINE 1 % SOLUTION: Performed by: EMERGENCY MEDICINE

## 2025-01-13 PROCEDURE — 82945 GLUCOSE OTHER FLUID: CPT | Performed by: NURSE PRACTITIONER

## 2025-01-13 PROCEDURE — 84132 ASSAY OF SERUM POTASSIUM: CPT | Performed by: EMERGENCY MEDICINE

## 2025-01-13 PROCEDURE — 85610 PROTHROMBIN TIME: CPT | Performed by: PHYSICIAN ASSISTANT

## 2025-01-13 PROCEDURE — 77002 NEEDLE LOCALIZATION BY XRAY: CPT

## 2025-01-13 PROCEDURE — 25010000002 CEFTRIAXONE PER 250 MG: Performed by: NURSE PRACTITIONER

## 2025-01-13 PROCEDURE — G0378 HOSPITAL OBSERVATION PER HR: HCPCS

## 2025-01-13 PROCEDURE — 80048 BASIC METABOLIC PNL TOTAL CA: CPT | Performed by: PHYSICIAN ASSISTANT

## 2025-01-13 PROCEDURE — 63710000001 PREDNISONE PER 1 MG: Performed by: PHYSICIAN ASSISTANT

## 2025-01-13 PROCEDURE — 87070 CULTURE OTHR SPECIMN AEROBIC: CPT | Performed by: NURSE PRACTITIONER

## 2025-01-13 PROCEDURE — 97161 PT EVAL LOW COMPLEX 20 MIN: CPT

## 2025-01-13 PROCEDURE — 85730 THROMBOPLASTIN TIME PARTIAL: CPT | Performed by: PHYSICIAN ASSISTANT

## 2025-01-13 PROCEDURE — 81001 URINALYSIS AUTO W/SCOPE: CPT | Performed by: NURSE PRACTITIONER

## 2025-01-13 PROCEDURE — 87641 MR-STAPH DNA AMP PROBE: CPT | Performed by: PHYSICIAN ASSISTANT

## 2025-01-13 RX ORDER — PREDNISONE 50 MG/1
50 TABLET ORAL
Qty: 4 TABLET | Refills: 0 | Status: SHIPPED | OUTPATIENT
Start: 2025-01-14 | End: 2025-01-18

## 2025-01-13 RX ORDER — LIDOCAINE HYDROCHLORIDE 10 MG/ML
10 INJECTION, SOLUTION INFILTRATION; PERINEURAL ONCE
Status: COMPLETED | OUTPATIENT
Start: 2025-01-13 | End: 2025-01-13

## 2025-01-13 RX ORDER — CEFDINIR 300 MG/1
300 CAPSULE ORAL 2 TIMES DAILY
Qty: 14 CAPSULE | Refills: 0 | Status: SHIPPED | OUTPATIENT
Start: 2025-01-13 | End: 2025-01-20

## 2025-01-13 RX ORDER — PREDNISONE 20 MG/1
50 TABLET ORAL
Status: DISCONTINUED | OUTPATIENT
Start: 2025-01-13 | End: 2025-01-13 | Stop reason: HOSPADM

## 2025-01-13 RX ORDER — TRIAMCINOLONE ACETONIDE 1 MG/G
1 CREAM TOPICAL EVERY 12 HOURS SCHEDULED
Status: DISCONTINUED | OUTPATIENT
Start: 2025-01-13 | End: 2025-01-13 | Stop reason: HOSPADM

## 2025-01-13 RX ADMIN — LIDOCAINE HYDROCHLORIDE 1 ML: 10 INJECTION, SOLUTION INFILTRATION; PERINEURAL at 11:19

## 2025-01-13 RX ADMIN — PREDNISONE 50 MG: 20 TABLET ORAL at 08:46

## 2025-01-13 RX ADMIN — LISINOPRIL 20 MG: 20 TABLET ORAL at 08:46

## 2025-01-13 RX ADMIN — TRIAMCINOLONE ACETONIDE 1 APPLICATION: 1 CREAM TOPICAL at 04:19

## 2025-01-13 RX ADMIN — HYDROCHLOROTHIAZIDE 12.5 MG: 12.5 TABLET ORAL at 08:46

## 2025-01-13 RX ADMIN — OXYCODONE AND ACETAMINOPHEN 1 TABLET: 5; 325 TABLET ORAL at 04:36

## 2025-01-13 RX ADMIN — Medication 10 ML: at 11:36

## 2025-01-13 RX ADMIN — POTASSIUM CHLORIDE 40 MEQ: 750 TABLET, EXTENDED RELEASE ORAL at 04:19

## 2025-01-13 RX ADMIN — TRIAMCINOLONE ACETONIDE 1 APPLICATION: 1 CREAM TOPICAL at 09:00

## 2025-01-13 RX ADMIN — CEFTRIAXONE 2000 MG: 2 INJECTION, POWDER, FOR SOLUTION INTRAMUSCULAR; INTRAVENOUS at 14:40

## 2025-01-13 NOTE — DISCHARGE SUMMARY
ED OBSERVATION PROGRESS/DISCHARGE SUMMARY    Date of Admission: 1/12/2025   LOS: 0 days   PCP: Erik Benitez MD    Final Diagnosis left knee effusion, urinary tract infection      Subjective     Hospital Outcome:     Jacob Miller is a 69 y.o. male with a past medical history significant for hypertension, hyperlipidemia, palpitations, BPH, EARLINE who presented to the emergency department with complaints of left knee pain.  He had a syncopal episode on 1/10 and it was admitted to the ED observation unit and had an unremarkable cardiac workup and was sent home 111.  He had prostate surgery on 1/8/2025 and the episode was felt to be vasovagal.  He says he had mild knee pain after the fall but it became more intense today.  Denies any chest pain or dyspnea.  No recent illness, fever, chills.     He had an x-ray in the emergency department which showed large effusion and suspected hemarthrosis.  MRI was recommended.     He will be admitted to the ED observation unit.  We will obtain MRI left knee.  Orthopedic surgery is consulted.    1/13/2025:   Patient had a fever overnight at 100.9.  Will check chest x-ray, UA, blood cultures and lactic and ESR and CRP.  Patient resting comfortably in bed otherwise.  Patient does report continued pain in the left knee in which she cannot weight-bear.  Orthopedic surgery came around this morning and is not concerned for infection of the knee at this time recommends close outpatient follow-up this week for further treatment and recommends NSAIDs and possible corticosteroids upon discharge as well as PT.    Given the patient had a fever last night, plan to continue IV Rocephin till this evening.      Patient was seen and evaluate by physical therapy who recommends outpatient PT and a walker at discharge.    ROS:  General: Low-grade fevers, no chills   respiratory: no cough, dyspnea  Cardiovascular: no chest pain, palpitations  Abdomen: No abdominal pain, nausea, vomiting, or  diarrhea  Neurologic: No focal weakness    Objective   Physical Exam:  I have reviewed the vital signs.  Temp:  [98 °F (36.7 °C)-100.9 °F (38.3 °C)] 99.2 °F (37.3 °C)  Heart Rate:  [68-77] 68  Resp:  [18] 18  BP: (135-146)/(64-75) 141/75  General Appearance:    Alert, cooperative, no distress nontoxic appearing  Head:    Normocephalic, atraumatic  Eyes:    Sclerae anicteric  Neck:   Supple, no mass  Lungs: Clear to auscultation bilaterally, respirations unlabored  Heart: Regular rate and rhythm, S1 and S2 normal, no murmur, rub or gallop  Abdomen:  Soft, nontender, bowel sounds active, nondistended  Extremities: No clubbing, cyanosis, or edema to lower extremities, moderate effusion overlying patient's left knee without erythema, crepitus or warmth  Pulses:  2+ and symmetric in distal lower extremities  Skin: No rashes   Neurologic: Oriented x3, Normal strength to extremities    Results Review:    I have reviewed the labs, radiology results and diagnostic studies.    Results from last 7 days   Lab Units 01/13/25  0409   WBC 10*3/mm3 9.04   HEMOGLOBIN g/dL 12.3*   HEMATOCRIT % 38.4   PLATELETS 10*3/mm3 226     Results from last 7 days   Lab Units 01/13/25  0409 01/12/25  1635 01/11/25  0256 01/10/25  0948   SODIUM mmol/L 140 138 143 138   POTASSIUM mmol/L 3.8  3.8 3.6 3.6 3.5   CHLORIDE mmol/L 103 99 105 100   CO2 mmol/L 25.0 24.0 26.0 27.2   BUN mg/dL 15 14 17 19   CREATININE mg/dL 0.96 0.88 1.12 1.37*   CALCIUM mg/dL 8.8 9.0 8.7 8.8   BILIRUBIN mg/dL  --  0.5 0.4 0.6   ALK PHOS U/L  --  68 71 78   ALT (SGPT) U/L  --  22 23 17   AST (SGOT) U/L  --  19 20 16   GLUCOSE mg/dL 105* 168* 115* 152*     Imaging Results (Last 24 Hours)       Procedure Component Value Units Date/Time    XR Chest 1 View [[id number]] Collected: 01/12/25 2054     Updated: 01/12/25 2058    Narrative:      CHEST SINGLE VIEW     HISTORY: 69 years of age, Male. Fever. Left knee pain.     COMPARISON: Portable upright chest 01/10/2025     FINDINGS:  There is linear opacity in the left lung base that most likely  represents atelectasis. Right lung appears clear. Cardiomediastinal  silhouette is not changed. There is no evidence for pulmonary edema or  pleural effusion.       Impression:      Linear opacity within the lateral aspect of the left lung  base most likely represents subsegmental atelectasis though recommend  follow-up with PA and lateral chest.        This report was finalized on 1/12/2025 8:54 PM by Artur Johnson M.D  on Workstation: [id number]       MRI Knee Left Without Contrast [[id number]] Collected: 01/12/25 1833     Updated: 01/12/25 2057    Narrative:      MRI LEFT KNEE WITHOUT CONTRAST     HISTORY: Acute left knee injury. Syncopal episode 2 days ago.     TECHNIQUE:  MRI left knee includes axial and coronal PD fat-sat as well  as sagittal PD, T1, T2-weighted sequences.     COMPARISON: Left knee x-rays 01/12/2025.     FINDINGS: There is a complex tear of the posterior horn medial meniscus.  Multidirectional tear is present associated with volume loss and tear  extends into the outer third of the meniscus. The degree of volume loss  is greatest at the posterior horn/body junction. Anterior horn medial  meniscus and the lateral meniscus are intact.     Cruciate ligaments, collateral ligament complexes, extensor mechanism  are intact. There is articular cartilage thinning and surface  irregularity at the central to posterior aspect of the medial femoral  condyle. Lateral compartment articular cartilage is mostly preserved.  There is advanced patellofemoral arthritis with full-thickness cartilage  loss at the median ridge and lateral facet of the patella and lateral  trochlea with mild subchondral edema. No fracture is evident. There is a  large knee joint effusion with synovial thickening/synovitis. Mild  periarticular muscular soft tissue edema. Proximal tibiofibular joint  appears normal. No fracture is evident.       Impression:      1.  Large knee joint effusion with synovial thickening/synovitis.. There  is a history of fever this could represent septic arthritis in the  proper clinical setting and arthrocentesis may be helpful for further  evaluation.  2. Complex tear of the posterior horn and body medial meniscus with  volume loss and most severely involving the posterior horn/body  junction.  3. Advanced patellofemoral compartment arthritis with full-thickness  cartilage loss at the lateral aspect of the patellofemoral compartment.  Moderate lateral compartment osteoarthritis with cartilage loss  particularly at the central to posterior aspect of the lateral femoral  condyle.     Findings pertaining to the large effusion and synovitis and possible  infection discussed with PRATIMA Monroy on 1/12/2025 at 8:50 p.m.        This report was finalized on 1/12/2025 8:54 PM by Artur Johnson M.D  on Workstation: [id number]       XR Knee 3 View Left [[id number]] Collected: 01/12/25 1454     Updated: 01/12/25 1543    Narrative:      X-RAY KNEE THREE-VIEW LEFT     HISTORY: Fall with left knee pain.     COMPARISON: None.     FINDINGS: There is a very large knee joint effusion and suspected  hemarthrosis in the trauma setting. There appear to be osteochondral  bodies projecting posterior to the distal femur and the knee joint line  on lateral view. There is prominent patellar spur formation.  There is  also calcification associated with the posterior margin of the  quadriceps tendon insertion. Prominent enthesophyte is present at the  origin of the patellar tendon. No fracture plane is evident.       Impression:      1. Large effusion and suspected hemarthrosis. Further evaluation for  internal derangement could be performed with MRI.  2. Suspect chronic osteochondral bodies posterior to the distal femur  and the knee joint line on the lateral view. Prominent patellar spur  formation and enthesophyte formation.     This report was finalized on 1/12/2025  3:40 PM by Artur Johnson M.D  on Workstation: [id number]               I have reviewed the medications.     Discharge Medications        Continue These Medications        Instructions Start Date   Alcohol Swabs pads   Clean area of injection as needed 3 times daily.      OneTouch Delica Lancets 33G misc   Check glucose 3 times daily             ASK your doctor about these medications        Instructions Start Date   amLODIPine 10 MG tablet  Commonly known as: NORVASC   10 mg, Oral, Daily      cephalexin 500 MG capsule  Commonly known as: KEFLEX   500 mg, 3 Times Daily      diclofenac 75 MG EC tablet  Commonly known as: VOLTAREN   TAKE 1 TABLET BY MOUTH DAILY      lisinopril-hydrochlorothiazide 20-12.5 MG per tablet  Commonly known as: PRINZIDE,ZESTORETIC   TAKE 2 TABLETS BY MOUTH EVERY MORNING      metoprolol succinate XL 50 MG 24 hr tablet  Commonly known as: TOPROL-XL   50 mg, Oral, Daily      ondansetron ODT 4 MG disintegrating tablet  Commonly known as: ZOFRAN-ODT   4 mg, Every 8 Hours PRN      OneTouch Ultra test strip  Generic drug: glucose blood   USE TO TEST FOR BLOOD SUGAR LEVELS ONCE DAILY AS DIRECTED      oxyCODONE-acetaminophen 7.5-325 MG per tablet  Commonly known as: PERCOCET   1 tablet, Every 8 Hours PRN      phenazopyridine 200 MG tablet  Commonly known as: PYRIDIUM   200 mg, 3 Times Daily PRN      rosuvastatin 10 MG tablet  Commonly known as: Crestor   10 mg, Oral, Daily              ---------------------------------------------------------------------------------------------  Assessment & Plan   Assessment/Problem List    Left knee pain      Plan:    Left knee pain, recent fall  Left knee effusion  -X-ray shows large effusion and suspected hemarthrosis  -MRI left knee shows large knee effusion, complex tear of medial meniscus and advanced patellofemoral compartment arthritis  -Consult orthopedic surgery  -Obtain basic labs  -Analgesics as needed  -Orthopedic surgery came around this morning  and is not concerned for infection of the knee at this time recommends close outpatient follow-up this week for further treatment and recommends NSAIDs and possible Medrol Dosepak upon discharge as well as PT.    Low grade fever  -Check chest x-ray negative, no cough, follow up cxr 2 view recommended and ordered  -Blood cultures pending,, lactic normal  -ESR and CRP elevated at 62 and 18 respectively, also postop day 5 from prostate surgery  -Urinalysis shows small leukocytes with 11-20 RBCs and large blood  -Arthrocentesis reassuring with 25,000 RBCs, 96 neutrophils, 4 mononuclear cells     Hypertension  -Continue amlodipine, lisinopril/HCTZ, metoprolol     Hyperlipidemia  -Continue statin     EARLINE  -Continue with home CPAP     Prostate surgery 1/8/2025  -has been on cephalexin since 1/8/2025, patient treated with Rocephin 2 g daily and will discharge on cefdinir     VTE Prophylaxis:  Mechanical VTE prophylaxis orders are present.    Disposition: Home    Follow-up after Discharge: PCP, urology, orthopedics    This note will serve as a discharge summary    PRATIMA Monroy 01/13/25 06:28 EST    I have worn appropriate PPE during this patient encounter, sanitized my hands both with entering and exiting patient's room.      43 minutes has been spent by [facility] providers in the care of this patient while under observation status

## 2025-01-13 NOTE — THERAPY EVALUATION
Patient Name: Jacob Miller  : 1955    MRN: [id number]                              Today's Date: 2025       Admit Date: 2025    Visit Dx:     ICD-10-CM ICD-9-CM   1. Acute pain of left knee  M25.562 719.46   2. Effusion of left knee  M25.462 719.06   3. Sprain of left knee, unspecified ligament, initial encounter  S83.92XA 844.9     Patient Active Problem List   Diagnosis    Benign essential hypertension    Hypercholesterolemia    Obstructive sleep apnea    Hyperglycemia    Actinic keratosis    Benign non-nodular prostatic hyperplasia without lower urinary tract symptoms    Osteoarthritis of both knees    Palpitations    Biceps tendinitis on left    Overweight (BMI 25.0-29.9)    Overactive bladder    Other male erectile dysfunction    Encounter for subsequent annual wellness visit in Medicare patient    I will check    Elevated PSA    Viral pharyngitis    Syncope    Left knee pain     Past Medical History:   Diagnosis Date    Hyperlipidemia     Hypertension     Kidney stones     PAC (premature atrial contraction)     Palpitations     Sleep apnea     USES C-PAP MACHINE     Past Surgical History:   Procedure Laterality Date    BELPHAROPTOSIS REPAIR Bilateral     HERNIA REPAIR      ROTATOR CUFF REPAIR        General Information       Row Name 25 1418          Physical Therapy Time and Intention    Document Type evaluation  -SM     Mode of Treatment individual therapy;physical therapy  -       Row Name 25 1418          General Information    Patient Profile Reviewed yes  -SM     Prior Level of Function independent:  -SM     Existing Precautions/Restrictions fall  -       Row Name 25 1418          Living Environment    People in Home spouse  -       Row Name 25 1418          Home Main Entrance    Number of Stairs, Main Entrance three  -       Row Name 25 1418          Cognition    Orientation Status (Cognition) oriented x 4  -SM       Row Name 25 1418           Safety Issues/Impairments Affecting Functional Mobility    Impairments Affecting Function (Mobility) pain;range of motion (ROM);strength  -               User Key  (r) = Recorded By, (t) = Taken By, (c) = Cosigned By      Initials Name Provider Type     Dorene Davis PT Physical Therapist                   Mobility       Row Name 01/13/25 1418          Bed Mobility    Bed Mobility supine-sit  -     Supine-Sit Bock (Bed Mobility) supervision  -     Comment, (Bed Mobility) sitting EOB at end of session  -       Row Name 01/13/25 1418          Sit-Stand Transfer    Sit-Stand Bock (Transfers) stand assist  -       Row Name 01/13/25 1418          Gait/Stairs (Locomotion)    Bock Level (Gait) stand assist  -     Assistive Device (Gait) walker, front-wheeled  -     Distance in Feet (Gait) 10  -SM     Deviations/Abnormal Patterns (Gait) gait speed decreased;antalgic  -     Comment, (Gait/Stairs) Gait slow and gaurded due to pain.  -               User Key  (r) = Recorded By, (t) = Taken By, (c) = Cosigned By      Initials Name Provider Type     Dorene Davis PT Physical Therapist                   Obj/Interventions       Row Name 01/13/25 1419          Range of Motion Comprehensive    General Range of Motion bilateral lower extremity ROM WFL  -     Comment, General Range of Motion L LE limited by pain  -[facility] Name 01/13/25 1419          Strength Comprehensive (MMT)    General Manual Muscle Testing (MMT) Assessment lower extremity strength deficits identified  -     Comment, General Manual Muscle Testing (MMT) Assessment L LE limited by pain  -       Row Name 01/13/25 1419          Balance    Balance Assessment sitting static balance;sitting dynamic balance;standing static balance;standing dynamic balance  -     Static Sitting Balance independent  -     Dynamic Sitting Balance modified independence  -     Position, Sitting Balance sitting  edge of bed  -     Static Standing Balance standby assist  -     Dynamic Standing Balance standby assist  -     Position/Device Used, Standing Balance supported;walker, front-wheeled  -     Balance Interventions sitting;standing;sit to stand;supported;static;dynamic  -               User Key  (r) = Recorded By, (t) = Taken By, (c) = Cosigned By      Initials Name Provider Type     Dorene Davis, PT Physical Therapist                   Goals/Plan    No documentation.                  Clinical Impression       [facility] Name 01/13/25 1419          Pain    Pain Location knee  -     Pain Side/Orientation left  -     Pain Management Interventions exercise or physical activity utilized  -     Response to Pain Interventions activity participation with tolerable pain  -[facility] Name 01/13/25 1419          Plan of Care Review    Plan of Care Reviewed With patient;family  -     Outcome Evaluation Patient is a 69 y.o male who presented to [facility] with left knee pain. Patient AOx4 supine in bed upon arrival. Imaging revealed meniscus tear.  Per ortho notes, plan to treat conservatively. Patient is independent at baseline with no AD. 3 AMANDA. Patient completed all bed mobility with sv. Patient stood from EOB with SBA and ambulated 10ft in room with rwx. Gait slow and gaurded due to pain. No overt LOB noted. Encouraged patient to continue ambulating several times per day. Anticipate return home with assist and OP PT. Acute PT will sign off.  -[facility] Name 01/13/25 1419          Therapy Assessment/Plan (PT)    Criteria for Skilled Interventions Met (PT) no;no problems identified which require skilled intervention  -     Therapy Frequency (PT) evaluation only  -[facility] Name 01/13/25 1419          Vital Signs    Pre Patient Position Supine  -     Intra Patient Position Standing  -     Post Patient Position Sitting  -[facility] Name 01/13/25 1419          Positioning and Restraints    Pre-Treatment  Position in bed  -SM     Post Treatment Position bed  -SM     In Bed notified nsg;call light within reach;encouraged to call for assist;with family/caregiver;sitting EOB  -               User Key  (r) = Recorded By, (t) = Taken By, (c) = Cosigned By      Initials Name Provider Type    Dorene Gamez PT Physical Therapist                   Outcome Measures       Row Name 01/13/25 1423          How much help from another person do you currently need...    Turning from your back to your side while in flat bed without using bedrails? 4  -SM     Moving from lying on back to sitting on the side of a flat bed without bedrails? 4  -SM     Moving to and from a bed to a chair (including a wheelchair)? 4  -SM     Standing up from a chair using your arms (e.g., wheelchair, bedside chair)? 4  -SM     Climbing 3-5 steps with a railing? 3  -SM     To walk in hospital room? 3  -SM     AM-PAC 6 Clicks Score (PT) 22  -     Highest Level of Mobility Goal 7 --> Walk 25 feet or more  -       Row Name 01/13/25 1423          Functional Assessment    Outcome Measure Options AM-PAC 6 Clicks Basic Mobility (PT)  -               User Key  (r) = Recorded By, (t) = Taken By, (c) = Cosigned By      Initials Name Provider Type    Dorene Gamez PT Physical Therapist                                 Physical Therapy Education       Title: PT OT SLP Therapies (Done)       Topic: Physical Therapy (Done)       Point: Mobility training (Done)       Learning Progress Summary            Patient Acceptance, E, VU by  at 1/13/2025 1423                      Point: Home exercise program (Done)       Learning Progress Summary            Patient Acceptance, E, VU by  at 1/13/2025 1423                      Point: Body mechanics (Done)       Learning Progress Summary            Patient Acceptance, E, VU by  at 1/13/2025 1423                      Point: Precautions (Done)       Learning Progress Summary            Patient Acceptance,  E, VU by  at 1/13/2025 1423                                      User Key       Initials Effective Dates Name Provider Type Discipline     05/02/22 -  Dorene Davis PT Physical Therapist PT                  PT Recommendation and Plan     Outcome Evaluation: Patient is a 69 y.o male who presented to [facility] with left knee pain. Patient AOx4 supine in bed upon arrival. Imaging revealed meniscus tear.  Per ortho notes, plan to treat conservatively. Patient is independent at baseline with no AD. 3 AMANDA. Patient completed all bed mobility with sv. Patient stood from EOB with SBA and ambulated 10ft in room with rwx. Gait slow and gaurded due to pain. No overt LOB noted. Encouraged patient to continue ambulating several times per day. Anticipate return home with assist and OP PT. Acute PT will sign off.     Time Calculation:         PT Charges       Row Name 01/13/25 1424             Time Calculation    Start Time 0909  -      Stop Time 0921  -      Time Calculation (min) 12 min  -      PT Received On 01/13/25  -         Time Calculation- PT    Total Timed Code Minutes- PT --  -                User Key  (r) = Recorded By, (t) = Taken By, (c) = Cosigned By      Initials Name Provider Type     Dorene Davis PT Physical Therapist                  Therapy Charges for Today       Code Description Service Date Service Provider Modifiers Qty    [id number] HC PT EVAL LOW COMPLEXITY 3 1/13/2025 Dorene Davis, MANUEL GP 1            PT G-Codes  Outcome Measure Options: AM-PAC 6 Clicks Basic Mobility (PT)  AM-PAC 6 Clicks Score (PT): 22  PT Discharge Summary  Anticipated Discharge Disposition (PT): home with assist, home with outpatient therapy services    Dorene Davis PT  1/13/2025

## 2025-01-13 NOTE — PLAN OF CARE
Goal Outcome Evaluation:  Plan of Care Reviewed With: patient, family           Outcome Evaluation: Patient is a 69 y.o male who presented to [facility] with left knee pain. Patient AOx4 supine in bed upon arrival. Imaging revealed meniscus tear.  Per ortho notes, plan to treat conservatively. Patient is independent at baseline with no AD. 3 AMANDA. Patient completed all bed mobility with sv. Patient stood from EOB with SBA and ambulated 10ft in room with rwx. Gait slow and gaurded due to pain. No overt LOB noted. Encouraged patient to continue ambulating several times per day. Anticipate return home with assist and OP PT. Acute PT will sign off.    Anticipated Discharge Disposition (PT): home with assist, home with outpatient therapy services

## 2025-01-13 NOTE — CASE MANAGEMENT/SOCIAL WORK
Discharge Planning Assessment  [facility]     Patient Name: Jacob Miller  MRN: [id number]  Today's Date: 1/13/2025    Admit Date: 1/12/2025    Plan: Home   Discharge Needs Assessment       Row Name 01/13/25 1111       Living Environment    People in Home spouse    Current Living Arrangements home    Potentially Unsafe Housing Conditions none    Primary Care Provided by self    Provides Primary Care For no one    Family Caregiver if Needed spouse    Quality of Family Relationships helpful;involved;supportive    Able to Return to Prior Arrangements yes       Resource/Environmental Concerns    Resource/Environmental Concerns none    Transportation Concerns none       Transition Planning    Patient/Family Anticipates Transition to home with family    Patient/Family Anticipated Services at Transition none    Transportation Anticipated family or friend will provide       Discharge Needs Assessment    Readmission Within the Last 30 Days no previous admission in last 30 days    Equipment Currently Used at Home none    Concerns to be Addressed no discharge needs identified    Anticipated Changes Related to Illness none                   Discharge Plan       Row Name 01/13/25 1111       Plan    Plan Home    Patient/Family in Agreement with Plan yes    Provided Post Acute Provider List? N/A    Provided Post Acute Provider Quality & Resource List? N/A    Plan Comments CCP met with patient at bedside. Introduced self and explained role of CCP. Patient confirmed the information on his face sheet is accurate. Patients. PCP is Erik Benitez. Patients uses [facility]. Patient lives at home with his spouse. Patient is independent at home. No DME is used at home. Plan is home with spouse. ALLEN given. PT order printed and given to patient. Family transport. CCP following.                  Continued Care and Services - Admitted Since 1/12/2025    No active coordination exists for this encounter.          Demographic Summary        Row Name 01/13/25 1111       General Information    Admission Type observation    Arrived From emergency department    Referral Source admission list    Reason for Consult discharge planning    Preferred Language English                   Functional Status       Row Name 01/13/25 1111       Functional Status    Usual Activity Tolerance good    Current Activity Tolerance good       Functional Status, IADL    Medications independent    Meal Preparation independent    Housekeeping independent    Laundry independent    Shopping independent       Mental Status    General Appearance WDL WDL       Mental Status Summary    Recent Changes in Mental Status/Cognitive Functioning no changes                   Psychosocial    No documentation.                  Abuse/Neglect    No documentation.                  Legal    No documentation.                  Substance Abuse    No documentation.                  Patient Forms    No documentation.

## 2025-01-13 NOTE — PROGRESS NOTES
Patient Care Team:  Erik Benitez MD as PCP - General (Internal Medicine)     DIMITRIOS KENYON H&P Note    I supervised care provided by the midlevel provider. We have discussed this patient's history, physical exam, and treatment plan. I have reviewed the midlevel provider's note and I agree with the midlevel provider's findings and plan of care.   SHARED VISIT: This visit was performed by BOTH a physician and an APC. The substantive portion of the medical decision making was performed by this attesting physician who made or approved the management plan and takes responsibility for patient management.   I have personally had a face to face encounter with the patient.   My personal findings are documented below:    History:  69-year-old male with past medical history including hypertension hyperlipidemia presents with left knee pain.  X-ray imaging shows large effusion with suspected hemarthrosis with recommendation for MRI imaging.    Physical Exam:  General: No acute distress.  HENT: NCAT, PERRL, Nares patent.  Eyes: no scleral icterus.  Neck: trachea midline, no ROM limitations.  CV: regular rhythm, regular rate.  Respiratory: normal effort, CTAB.  Abdomen: soft, nondistended, NTTP, no rebound tenderness, no guarding or rigidity.  Musculoskeletal: Left knee: Positive effusion, limited flexion, full extension, no redness or warmth, no pain with passive range of motion, neurovascular intact distally.  Neuro: alert, moves all extremities, follows commands.  Skin: warm, dry.    Assessment and Plan:  Patient admitted to the observation unit, obtaining MRI imaging of the knee, orthopedic surgery consulted, treating pain, PT consulted.

## 2025-01-13 NOTE — CONSULTS
Orthopaedic Surgery  Consult Note  Dr. LANA Rios” Alexus CHARLY  [phone]    HPI:  Patient is a 69 y.o. Not  or  male who presents with sharp complaints of left knee pain.  He has a history of a recent prostate procedure.  Over the last few days he has had some increased knee pain.  His knee pain became so severe that he was unable to bear weight and he presented to the hospital.  An x-ray showed some osteoarthritis and an MRI confirmed this along with some chronic meniscus tearing.  His pain has gotten slightly better with the medications he is getting.  Orthopedics was consulted.  He does complain of continued pain in that knee but it does feel significantly better than yesterday.    MEDICAL HISTORY  Past Medical History:   Diagnosis Date    Hyperlipidemia     Hypertension     Kidney stones     PAC (premature atrial contraction)     Palpitations     Sleep apnea     USES C-PAP MACHINE     Past Surgical History:   Procedure Laterality Date    BELPHAROPTOSIS REPAIR Bilateral     HERNIA REPAIR      ROTATOR CUFF REPAIR       Prior to Admission medications    Medication Sig Start Date End Date Taking? Authorizing Provider   Alcohol Swabs pads Clean area of injection as needed 3 times daily. 8/1/22  Yes Erik Benitez MD   amLODIPine (NORVASC) 10 MG tablet TAKE 1 TABLET BY MOUTH DAILY 12/2/24  Yes Madison Barahona MD   cephalexin (KEFLEX) 500 MG capsule Take 1 capsule by mouth 3 (Three) Times a Day. 1/8/25 1/13/25 Yes ProviderNurys MD   diclofenac (VOLTAREN) 75 MG EC tablet TAKE 1 TABLET BY MOUTH DAILY  Patient taking differently: Take 1 tablet by mouth Daily. 12/2/24  Yes Madison Barahona MD   lisinopril-hydrochlorothiazide (PRINZIDE,ZESTORETIC) 20-12.5 MG per tablet TAKE 2 TABLETS BY MOUTH EVERY MORNING  Patient taking differently: Take 2 tablets by mouth Daily. 8/30/24  Yes Erik Benitez MD   metoprolol succinate XL (TOPROL-XL) 50 MG 24 hr tablet TAKE 1 TABLET BY MOUTH DAILY 10/23/24   Yes Erik Benitez MD   ondansetron ODT (ZOFRAN-ODT) 4 MG disintegrating tablet Place 1 tablet on the tongue Every 8 (Eight) Hours As Needed for Nausea or Vomiting.   Yes Provider, Historical, MD   OneTouch Delica Lancets 33G misc Check glucose 3 times daily 8/9/22  Yes Erik Benitez MD OneTouch Ultra test strip USE TO TEST FOR BLOOD SUGAR LEVELS ONCE DAILY AS DIRECTED 3/22/21  Yes Erik Benitez MD   oxyCODONE-acetaminophen (PERCOCET) 7.5-325 MG per tablet Take 1 tablet by mouth Every 8 (Eight) Hours As Needed for Moderate Pain.   Yes Provider, MD Nurys   phenazopyridine (PYRIDIUM) 200 MG tablet Take 1 tablet by mouth 3 (Three) Times a Day As Needed for Bladder Spasms.   Yes ProviderNurys MD   rosuvastatin (Crestor) 10 MG tablet Take 1 tablet by mouth Daily.  Patient taking differently: Take 1 tablet by mouth 3 (Three) Times a Week. Tuesday, Thursday, saturday 12/28/23  Yes Erik Benitez MD     No Known Allergies  Most Recent Immunizations   Administered Date(s) Administered    ABRYSVO (RSV, 60+ or pregnant women 32-36 wks) 01/18/2024    COVID-19 (PFIZER) 12YRS+ (COMIRNATY) 09/04/2024    COVID-19 (PFIZER) BIVALENT 12+YRS 10/22/2022    COVID-19 (PFIZER) Purple Cap Monovalent 10/30/2021    Covid-19 (Pfizer) Gray Cap Monovalent 05/21/2022    FLUAD TRI 65YR+ 11/08/2019    FluMist 2-49yrs 11/13/2017    Fluad Quad 65+ 11/03/2022    Fluzone High-Dose 65+YRS 10/23/2024    Fluzone High-Dose 65+yrs 11/02/2021    Hepatitis A 12/28/2018    INFLUENZA SPLIT TRI 11/13/2018    Influenza, Unspecified 01/01/2022    Pneumococcal Conjugate 20-Valent (PCV20) 11/08/2023    Pneumococcal Polysaccharide (PPSV23) 10/22/2020    Shingrix 05/04/2019    Tdap 11/08/2023    Zostavax 12/22/2016     Social History     Tobacco Use    Smoking status: Never     Passive exposure: Never    Smokeless tobacco: Never    Tobacco comments:     Daily caffeine use   Substance Use Topics    Alcohol use: Yes     Comment: occ     "  Social History     Substance and Sexual Activity   Drug Use No       VITALS: /75 (BP Location: Left arm, Patient Position: Lying)   Pulse 68   Temp 99.2 °F (37.3 °C) (Oral)   Resp 18   Ht 185.4 cm (72.99\")   Wt 99.8 kg (220 lb 0.3 oz)   SpO2 96%   BMI 29.03 kg/m²  Body mass index is 29.03 kg/m².    PHYSICAL EXAM:   CONSTITUTIONAL: No acute distress  LUNGS: Equal chest rise, no shortness of air  CARDIOVASCULAR: palpable peripheral pulses  SKIN: no skin lesions in the area examined  LYMPH: no lymphadenopathy in the area examined  Musculoskeletal: Left knee with moderate effusion.  Normal neurovascular exam.  No erythema but there is some warmth    RADIOLOGY REVIEW:   XR Chest 1 View    Result Date: 1/12/2025  Linear opacity within the lateral aspect of the left lung base most likely represents subsegmental atelectasis though recommend follow-up with PA and lateral chest.   This report was finalized on 1/12/2025 8:54 PM by Artur Johnson M.D on Workstation: [id number]      MRI Knee Left Without Contrast    Result Date: 1/12/2025  1. Large knee joint effusion with synovial thickening/synovitis.. There is a history of fever this could represent septic arthritis in the proper clinical setting and arthrocentesis may be helpful for further evaluation. 2. Complex tear of the posterior horn and body medial meniscus with volume loss and most severely involving the posterior horn/body junction. 3. Advanced patellofemoral compartment arthritis with full-thickness cartilage loss at the lateral aspect of the patellofemoral compartment. Moderate lateral compartment osteoarthritis with cartilage loss particularly at the central to posterior aspect of the lateral femoral condyle.  Findings pertaining to the large effusion and synovitis and possible infection discussed with PRATIMA Monroy on 1/12/2025 at 8:50 p.m.   This report was finalized on 1/12/2025 8:54 PM by Artur Johnson M.D on Workstation: [id number]  "     XR Knee 3 View Left    Result Date: 1/12/2025  1. Large effusion and suspected hemarthrosis. Further evaluation for internal derangement could be performed with MRI. 2. Suspect chronic osteochondral bodies posterior to the distal femur and the knee joint line on the lateral view. Prominent patellar spur formation and enthesophyte formation.  This report was finalized on 1/12/2025 3:40 PM by Artur Johnson M.D on Workstation: [id number]       LABS:   Results for the past 24 hours:   Recent Results (from the past 24 hours)   CBC Auto Differential    Collection Time: 01/12/25  4:35 PM    Specimen: Arm, Right; Blood   Result Value Ref Range    WBC 9.36 3.40 - 10.80 10*3/mm3    RBC 4.50 4.14 - 5.80 10*6/mm3    Hemoglobin 12.4 (L) 13.0 - 17.7 g/dL    Hematocrit 36.8 (L) 37.5 - 51.0 %    MCV 81.8 79.0 - 97.0 fL    MCH 27.6 26.6 - 33.0 pg    MCHC 33.7 31.5 - 35.7 g/dL    RDW 15.6 (H) 12.3 - 15.4 %    RDW-SD 46.2 37.0 - 54.0 fl    MPV 10.2 6.0 - 12.0 fL    Platelets 205 140 - 450 10*3/mm3    nRBC 0.0 0.0 - 0.2 /100 WBC   Comprehensive Metabolic Panel    Collection Time: 01/12/25  4:35 PM    Specimen: Arm, Right; Blood   Result Value Ref Range    Glucose 168 (H) 65 - 99 mg/dL    BUN 14 8 - 23 mg/dL    Creatinine 0.88 0.76 - 1.27 mg/dL    Sodium 138 136 - 145 mmol/L    Potassium 3.6 3.5 - 5.2 mmol/L    Chloride 99 98 - 107 mmol/L    CO2 24.0 22.0 - 29.0 mmol/L    Calcium 9.0 8.6 - 10.5 mg/dL    Total Protein 6.5 6.0 - 8.5 g/dL    Albumin 3.6 3.5 - 5.2 g/dL    ALT (SGPT) 22 1 - 41 U/L    AST (SGOT) 19 1 - 40 U/L    Alkaline Phosphatase 68 39 - 117 U/L    Total Bilirubin 0.5 0.0 - 1.2 mg/dL    Globulin 2.9 gm/dL    A/G Ratio 1.2 g/dL    BUN/Creatinine Ratio 15.9 7.0 - 25.0    Anion Gap 15.0 5.0 - 15.0 mmol/L    eGFR 91.9 >60.0 mL/min/1.73   Manual Differential    Collection Time: 01/12/25  4:35 PM    Specimen: Arm, Right; Blood   Result Value Ref Range    Neutrophil % 65.0 42.7 - 76.0 %    Lymphocyte % 15.0 (L) 19.6 -  45.3 %    Monocyte % 18.0 (H) 5.0 - 12.0 %    Eosinophil % 1.0 0.3 - 6.2 %    Basophil % 1.0 0.0 - 1.5 %    Neutrophils Absolute 6.08 1.70 - 7.00 10*3/mm3    Lymphocytes Absolute 1.40 0.70 - 3.10 10*3/mm3    Monocytes Absolute 1.68 (H) 0.10 - 0.90 10*3/mm3    Eosinophils Absolute 0.09 0.00 - 0.40 10*3/mm3    Basophils Absolute 0.09 0.00 - 0.20 10*3/mm3    RBC Morphology Normal Normal    WBC Morphology Normal Normal    Platelet Morphology Normal Normal   Sedimentation Rate    Collection Time: 01/12/25 10:08 PM    Specimen: Blood   Result Value Ref Range    Sed Rate 62 (H) 0 - 20 mm/hr   C-reactive Protein    Collection Time: 01/12/25 10:08 PM    Specimen: Blood   Result Value Ref Range    C-Reactive Protein 18.56 (H) 0.00 - 0.50 mg/dL   Lactic Acid, Plasma    Collection Time: 01/12/25 11:23 PM    Specimen: Blood   Result Value Ref Range    Lactate 0.9 0.5 - 2.0 mmol/L   CBC (No Diff)    Collection Time: 01/13/25  4:09 AM    Specimen: Arm, Left; Blood   Result Value Ref Range    WBC 9.04 3.40 - 10.80 10*3/mm3    RBC 4.60 4.14 - 5.80 10*6/mm3    Hemoglobin 12.3 (L) 13.0 - 17.7 g/dL    Hematocrit 38.4 37.5 - 51.0 %    MCV 83.5 79.0 - 97.0 fL    MCH 26.7 26.6 - 33.0 pg    MCHC 32.0 31.5 - 35.7 g/dL    RDW 15.7 (H) 12.3 - 15.4 %    RDW-SD 47.1 37.0 - 54.0 fl    MPV 10.6 6.0 - 12.0 fL    Platelets 226 140 - 450 10*3/mm3   Basic Metabolic Panel    Collection Time: 01/13/25  4:09 AM    Specimen: Arm, Left; Blood   Result Value Ref Range    Glucose 105 (H) 65 - 99 mg/dL    BUN 15 8 - 23 mg/dL    Creatinine 0.96 0.76 - 1.27 mg/dL    Sodium 140 136 - 145 mmol/L    Potassium 3.8 3.5 - 5.2 mmol/L    Chloride 103 98 - 107 mmol/L    CO2 25.0 22.0 - 29.0 mmol/L    Calcium 8.8 8.6 - 10.5 mg/dL    BUN/Creatinine Ratio 15.6 7.0 - 25.0    Anion Gap 12.0 5.0 - 15.0 mmol/L    eGFR 84.5 >60.0 mL/min/1.73   Protime-INR    Collection Time: 01/13/25  4:09 AM    Specimen: Arm, Left; Blood   Result Value Ref Range    Protime 16.0 (H) 11.7 -  "14.2 Seconds    INR 1.26 (H) 0.90 - 1.10   aPTT    Collection Time: 01/13/25  4:09 AM    Specimen: Arm, Left; Blood   Result Value Ref Range    PTT 37.3 (H) 22.7 - 35.4 seconds   Potassium    Collection Time: 01/13/25  4:09 AM    Specimen: Arm, Left; Blood   Result Value Ref Range    Potassium 3.8 3.5 - 5.2 mmol/L   MRSA Screen, PCR (Inpatient) - Swab, Nares    Collection Time: 01/13/25  4:39 AM    Specimen: Nares; Swab   Result Value Ref Range    MRSA PCR No MRSA Detected No MRSA Detected       IMPRESSION:  Patient is a 69 y.o. Not  or  male with left knee arthritis flareup    PLAN:   Admited to: Joce Licona MD  There does not appear to be any clinical concern for infection at this time.  I would recommend anti-inflammatory medication along with possibly a Medrol Dosepak and physical therapy.  I would recommend close outpatient follow-up at our office for discussion of further treatment options.      R \"Andrea\" Alexus PARKS MD  Orthopaedic Surgery  [facility]  [phone] - [place] Office  [phone] - [place] Office            "

## 2025-01-13 NOTE — PROGRESS NOTES
"[facility] Clinical Pharmacy Services: Vancomycin Pharmacokinetic Initial Consult Note    Jacob Miller is a 69 y.o. male who is on day 1 of pharmacy to dose vancomycin.    Indication: Bone and/or Joint Infection and concern septic joint of knee  Consulting Provider: Jason Epperson  Planned Duration of Therapy: 7 days  Loading Dose Ordered or Given: 2000 mg on 1/12 just ordered  MRSA PCR performed: yes; Result: pending  Culture/Source: Cx ordered to be collected  Target: -600 mg/L.hr   Pertinent Vanc Dosing History:   Other Antimicrobials: Cefepime    Vitals/Labs  Ht: 185.4 cm (72.99\"); Wt: 99.8 kg (220 lb 0.3 oz)  Temp Readings from Last 1 Encounters:   01/12/25 (!) 100.9 °F (38.3 °C) (Oral)    Estimated Creatinine Clearance: 95.7 mL/min (by C-G formula based on SCr of 0.88 mg/dL).        Results from last 7 days   Lab Units 01/12/25  1635 01/11/25  0256 01/10/25  0948   CREATININE mg/dL 0.88 1.12 1.37*   WBC 10*3/mm3 9.36 9.66 14.73*     Assessment/Plan:    Vancomycin Dose:   1000 mg IV every  12  hours Scheduled to start earlier in the morning to achieve exposure> 400 sooner  Predictive AUC level for the dose ordered is 486 mg/L.hr, which is within the target of 400-600 mg/L.hr  Will defer ordering necessary levels to day clinical    Pharmacy will follow patient's kidney function and will adjust doses and obtain levels as necessary. Thank you for involving pharmacy in this patient's care. Please contact pharmacy with any questions or concerns.                           Beau Colon, [facility]  Clinical Pharmacist   "

## 2025-01-13 NOTE — OUTREACH NOTE
Prep Survey      Flowsheet Row Responses   [facility] patient discharged from? [place]   Is LACE score < 7 ? Yes   Eligibility [facility]   Date of Admission 01/12/25   Date of Discharge 01/13/25   Discharge Disposition Home or Self Care   Discharge diagnosis Left knee pain   Does the patient have one of the following disease processes/diagnoses(primary or secondary)? Other   Does the patient have Home health ordered? No   Is there a DME ordered? Yes   What DME was ordered? [facility] - [place]   Prep survey completed? Yes            DONALD RODRIGUEZ - Registered Nurse

## 2025-01-13 NOTE — DISCHARGE INSTRUCTIONS
There are cultures pending on the aspirate from your knee, your blood in your urine.  We recommend stopping your Keflex and starting on cefdinir and following up with orthopedics in about 1 week.

## 2025-01-13 NOTE — OUTREACH NOTE
Prep Survey      Flowsheet Row Responses   [facility] patient discharged from? [place]   Is LACE score < 7 ? No   Eligibility Not Eligible   What are the reasons patient is not eligible? Readmitted   Does the patient have one of the following disease processes/diagnoses(primary or secondary)? Other   Prep survey completed? Yes            Delphine LARSEN - Registered Nurse

## 2025-01-13 NOTE — PLAN OF CARE
Goal Outcome Evaluation:  Plan of Care Reviewed With: patient, family   Pain controlled with oral analgesic. Fever improved  100.9 now 99.2. Blood cultures, morning labs, MRSA PCR pending, Fluid bolus of 1L administered with IVPB. HERNANDO consult pending.

## 2025-01-14 ENCOUNTER — TRANSITIONAL CARE MANAGEMENT TELEPHONE ENCOUNTER (OUTPATIENT)
Dept: CALL CENTER | Facility: HOSPITAL | Age: 70
End: 2025-01-14
Payer: MEDICARE

## 2025-01-14 LAB — BACTERIA SPEC AEROBE CULT: NO GROWTH

## 2025-01-14 NOTE — CASE MANAGEMENT/SOCIAL WORK
Case Management Discharge Note      Final Note: Home with OP therapy and a rolling walker    Provided Post Acute Provider List?: N/A  Provided Post Acute Provider Quality & Resource List?: N/A    Selected Continued Care - Discharged on 1/13/2025 Admission date: 1/12/2025 - Discharge disposition: Home or Self Care      Destination    No services have been selected for the patient.                Durable Medical Equipment    No services have been selected for the patient.                Dialysis/Infusion    No services have been selected for the patient.                Home Medical Care    No services have been selected for the patient.                Therapy    No services have been selected for the patient.                Community Resources    No services have been selected for the patient.                Community & DME    No services have been selected for the patient.                    Transportation Services  Private: Car    Final Discharge Disposition Code: 01 - home or self-care

## 2025-01-14 NOTE — OUTREACH NOTE
Call Center TCM Note      Flowsheet Row Responses   [facility] patient discharged from? [place]   Does the patient have one of the following disease processes/diagnoses(primary or secondary)? Other   TCM attempt successful? Yes   Call start time 1044   Call end time 1047   Discharge diagnosis Left knee pain   Meds reviewed with patient/caregiver? Yes   Is the patient having any side effects they believe may be caused by any medication additions or changes? No   Does the patient have all medications ordered at discharge? Yes   Is the patient taking all medications as directed (includes completed medication regime)? Yes   Medication comments antibiotic, steroid   Comments 1/30/2025 10:00 AM  HOSPITAL FOLLOW UP WITH CARDIOLOGY   Does the patient have an appointment with their PCP within 7-14 days of discharge? No   Nursing Interventions Patient declined scheduling/rescheduling appointment at this time   Has home health visited the patient within 72 hours of discharge? N/A   What DME was ordered? [facility]  [facility]walker]   Has all DME been delivered? Yes   Psychosocial issues? No   Did the patient receive a copy of their discharge instructions? Yes   Nursing interventions Reviewed instructions with patient   What is the patient's perception of their health status since discharge? Improving   Is the patient/caregiver able to teach back signs and symptoms related to disease process for when to call PCP? Yes   Is the patient/caregiver able to teach back signs and symptoms related to disease process for when to call 911? Yes   Is the patient/caregiver able to teach back the hierarchy of who to call/visit for symptoms/problems? PCP, Specialist, Home health nurse, Urgent Care, ED, 911 Yes   If the patient is a current smoker, are they able to teach back resources for cessation? Not a smoker   TCM call completed? Yes   Call end time 1047   Would this patient benefit from a Referral to [facility] Social Work?  No   Is the patient interested in additional calls from an ambulatory ? No            Iman Chavez, JAMES    1/14/2025, 10:47 EST

## 2025-01-15 LAB — GLUCOSE FLD-MCNC: 7 MG/DL

## 2025-01-17 LAB
BACTERIA SPEC AEROBE CULT: NORMAL
BACTERIA SPEC AEROBE CULT: NORMAL

## 2025-01-18 LAB
BACTERIA FLD CULT: NORMAL
GRAM STN SPEC: NORMAL
GRAM STN SPEC: NORMAL

## 2025-01-20 ENCOUNTER — OFFICE VISIT (OUTPATIENT)
Dept: FAMILY MEDICINE CLINIC | Facility: CLINIC | Age: 70
End: 2025-01-20
Payer: MEDICARE

## 2025-01-20 VITALS
RESPIRATION RATE: 16 BRPM | SYSTOLIC BLOOD PRESSURE: 122 MMHG | HEART RATE: 65 BPM | WEIGHT: 221.1 LBS | BODY MASS INDEX: 29.3 KG/M2 | DIASTOLIC BLOOD PRESSURE: 60 MMHG | HEIGHT: 73 IN | TEMPERATURE: 96.6 F | OXYGEN SATURATION: 97 %

## 2025-01-20 DIAGNOSIS — M25.562 ACUTE PAIN OF LEFT KNEE: Primary | ICD-10-CM

## 2025-01-20 PROCEDURE — 99213 OFFICE O/P EST LOW 20 MIN: CPT | Performed by: INTERNAL MEDICINE

## 2025-01-20 PROCEDURE — 1126F AMNT PAIN NOTED NONE PRSNT: CPT | Performed by: INTERNAL MEDICINE

## 2025-01-20 PROCEDURE — 3078F DIAST BP <80 MM HG: CPT | Performed by: INTERNAL MEDICINE

## 2025-01-20 PROCEDURE — 3074F SYST BP LT 130 MM HG: CPT | Performed by: INTERNAL MEDICINE

## 2025-01-20 PROCEDURE — G2211 COMPLEX E/M VISIT ADD ON: HCPCS | Performed by: INTERNAL MEDICINE

## 2025-01-20 RX ORDER — TRIAMCINOLONE ACETONIDE 1 MG/G
CREAM TOPICAL
COMMUNITY
Start: 2024-12-11

## 2025-01-21 NOTE — PROGRESS NOTES
Subjective   Jacob Miller is a 69 y.o. male. Patient is here today for   Chief Complaint   Patient presents with    Knee Pain     LEFT; ER FOLLOW UP 1/12/25        History of Present Illness  This patient gone to the emergency room after a syncopal spell at home in the bathroom.  This was thought to be vasovagal.  Incidentally, he injured his left knee while falling.  He hoped it would get better obviously but it got worse in the meantime and he again presented to the emergency room a couple days later.  MRI of his knee revealed a complex tear of the posterior horn medial meniscus with a large stool left knee joint effusion with synovitis and synovial thickening.  Knee Pain       History of Present Illness  The patient presents for a follow-up visit after a recent hospitalization.    He underwent prostate surgery last Wednesday, following which he experienced several episodes of flushing, faintness, and nausea on Friday morning. During one of these episodes, he lost consciousness for a few seconds and fell, hitting his ribs on the side of the tub. This incident resulted in bruising but no fractures. He was subsequently admitted to the hospital via ambulance on 01/10/2025 due to a syncopal episode. Despite an unremarkable cardiac workup, he was diagnosed with a vasovagal response. He also developed a fever during his hospital stay. He has not experienced any further episodes of syncope or pre-syncope.    An x-ray revealed an effusion in his left knee, prompting an MRI and consultation with orthopedic surgery. The following day, he began experiencing severe knee pain, which has since improved. He received a cortisone injection in his knee two days ago, which provided significant relief. He has a history of arthritis in his knees and suspects a fall may have caused a tear in his knee.    He reports some incontinence and frequent urination, which were discussed with his urologist's nurse practitioner and deemed  common post-surgery symptoms. He has noticed a slight decrease in urgency but is not overly concerned as he was advised that these issues should resolve over time. He has a follow-up appointment with his urologist next week.    He is currently using a CPAP machine and has appointments scheduled with physical therapy next week. He is due for his annual wellness visit and is interested in starting probiotics due to his recent antibiotic use in the hospital.    SOCIAL HISTORY  The patient is retired.      Vitals:    01/20/25 1547   BP: 122/60   Pulse: 65   Resp: 16   Temp: 96.6 °F (35.9 °C)   SpO2: 97%     Body mass index is 29.18 kg/m².    Past Medical History:   Diagnosis Date    Hyperlipidemia     Hypertension     Kidney stones     PAC (premature atrial contraction)     Palpitations     Sleep apnea     USES C-PAP MACHINE      No Known Allergies   Social History     Socioeconomic History    Marital status:    Tobacco Use    Smoking status: Never     Passive exposure: Never    Smokeless tobacco: Never    Tobacco comments:     Daily caffeine use   Vaping Use    Vaping status: Never Used   Substance and Sexual Activity    Alcohol use: Yes     Comment: occ    Drug use: No    Sexual activity: Defer        Current Outpatient Medications:     Alcohol Swabs pads, Clean area of injection as needed 3 times daily., Disp: 100 each, Rfl: 3    amLODIPine (NORVASC) 10 MG tablet, TAKE 1 TABLET BY MOUTH DAILY, Disp: 90 tablet, Rfl: 0    diclofenac (VOLTAREN) 75 MG EC tablet, TAKE 1 TABLET BY MOUTH DAILY (Patient taking differently: Take 1 tablet by mouth Daily.), Disp: 30 tablet, Rfl: 1    lisinopril-hydrochlorothiazide (PRINZIDE,ZESTORETIC) 20-12.5 MG per tablet, TAKE 2 TABLETS BY MOUTH EVERY MORNING (Patient taking differently: Take 2 tablets by mouth Daily.), Disp: 180 tablet, Rfl: 1    metoprolol succinate XL (TOPROL-XL) 50 MG 24 hr tablet, TAKE 1 TABLET BY MOUTH DAILY, Disp: 90 tablet, Rfl: 1    ondansetron ODT  (ZOFRAN-ODT) 4 MG disintegrating tablet, Place 1 tablet on the tongue Every 8 (Eight) Hours As Needed for Nausea or Vomiting., Disp: , Rfl:     OneTouch Delica Lancets 33G misc, Check glucose 3 times daily, Disp: 100 each, Rfl: 3    OneTouch Ultra test strip, USE TO TEST FOR BLOOD SUGAR LEVELS ONCE DAILY AS DIRECTED, Disp: 100 each, Rfl: 11    oxyCODONE-acetaminophen (PERCOCET) 7.5-325 MG per tablet, Take 1 tablet by mouth Every 8 (Eight) Hours As Needed for Moderate Pain., Disp: , Rfl:     phenazopyridine (PYRIDIUM) 200 MG tablet, Take 1 tablet by mouth 3 (Three) Times a Day As Needed for Bladder Spasms., Disp: , Rfl:     rosuvastatin (Crestor) 10 MG tablet, Take 1 tablet by mouth Daily. (Patient taking differently: Take 1 tablet by mouth 3 (Three) Times a Week. Tuesday, Thursday, saturday), Disp: 90 tablet, Rfl: 3    triamcinolone (KENALOG) 0.1 % cream, , Disp: , Rfl:      Objective     Review of Systems   Constitutional: Negative.    HENT: Negative.     Respiratory: Negative.     Cardiovascular: Negative.    Musculoskeletal: Negative.    Psychiatric/Behavioral: Negative.         Physical Exam  Vitals and nursing note reviewed.   Constitutional:       Appearance: Normal appearance.      Comments: Pleasant, neatly groomed, no distress   Cardiovascular:      Rate and Rhythm: Normal rate and regular rhythm.      Heart sounds: Normal heart sounds. No murmur heard.     No gallop.   Neurological:      Mental Status: He is alert and oriented to person, place, and time.   Psychiatric:         Mood and Affect: Mood normal.         Thought Content: Thought content normal.       Physical Exam  Lungs are clear.  Heart sounds are normal.    Vital Signs  Blood pressure is normal.    Results  Imaging  X-ray showed an effusion in the left knee. MRI of left knee showed a tear in the posterior horn and body of the medial meniscus.    Assessment & Plan    Problems Addressed this Visit          Musculoskeletal and Injuries    Left  knee pain - Primary     Diagnoses         Codes Comments    Acute pain of left knee    -  Primary ICD-10-CM: M25.562  ICD-9-CM: 719.46           Assessment & Plan  1. Post-surgical follow-up.  He had prostate surgery on 01/08/2025. He experienced a syncopal episode on 01/10/2025, which was attributed to a vasovagal response. He was admitted for observation and had an unremarkable cardiac workup. He also had a fever during his hospital stay. He reports some incontinence and frequent urination, which were noted as common post-surgery symptoms by the nurse practitioner. He has a follow-up appointment with his urologist next week.    2. Left knee effusion.  An x-ray in the emergency room showed an effusion in his left knee. An MRI revealed a tear in the posterior horn and body of the medial meniscus. He received a cortisone shot in the knee two days ago, which has provided some relief. He reports significant improvement in pain and mobility since the fluid was drained and the cortisone shot was administered.    3. Vasovagal syncope.  He experienced a syncopal episode on 01/10/2025, which was attributed to a vasovagal response. He reports no further episodes of syncope or pre-syncope since then.    4. Health maintenance.  His blood pressure readings today are within the normal range. His last Medicare annual wellness visit was conducted on 12/28/2023. He is advised to schedule his next Medicare annual wellness visit at his earliest convenience.    Follow-up  The patient will follow up in 1 to 3 months for a wellness visit.    PROCEDURE  The patient underwent prostate surgery on 01/08/2025.      No follow-ups on file.    Patient or patient representative verbalized consent for the use of Ambient Listening during the visit with  Erik Benitez MD for chart documentation. 1/21/2025  15:14 EST

## 2025-01-27 ENCOUNTER — TREATMENT (OUTPATIENT)
Dept: PHYSICAL THERAPY | Facility: CLINIC | Age: 70
End: 2025-01-27
Payer: MEDICARE

## 2025-01-27 DIAGNOSIS — R26.89 DECREASED FUNCTIONAL MOBILITY: ICD-10-CM

## 2025-01-27 DIAGNOSIS — R29.898 WEAKNESS OF LEFT LOWER EXTREMITY: ICD-10-CM

## 2025-01-27 DIAGNOSIS — S83.92XA SPRAIN OF LEFT KNEE, UNSPECIFIED LIGAMENT, INITIAL ENCOUNTER: Primary | ICD-10-CM

## 2025-01-27 PROCEDURE — 97110 THERAPEUTIC EXERCISES: CPT

## 2025-01-27 PROCEDURE — 97530 THERAPEUTIC ACTIVITIES: CPT

## 2025-01-27 PROCEDURE — 97161 PT EVAL LOW COMPLEX 20 MIN: CPT

## 2025-01-27 NOTE — PROGRESS NOTES
Physical Therapy Initial Evaluation and Plan of Care  [facility]  [place] - [address], [address]     [place] [address]   Phone: [phone]   Fax: [phone]    Patient: Jacob Miller   : 1955  Diagnosis/ICD-10 Code:  Sprain of left knee, unspecified ligament, initial encounter [S83.92XA]  Referring practitioner: ROB Turner  Date of Initial Visit: 2025  Today's Date: 2025  Patient seen for 1 session         Visit Diagnoses:    ICD-10-CM ICD-9-CM   1. Sprain of left knee, unspecified ligament, initial encounter  S83.92XA 844.9   2. Decreased functional mobility  R26.89 781.99   3. Weakness of left lower extremity  R29.898 729.89         Subjective Questionnaire: WOMAC: 22%      Subjective Evaluation    History of Present Illness  Mechanism of injury: Patient is a 68 y/o male who presents to physical therapy for L knee pain secondary to a fall secondary to a syncope event that occurred 2 weeks ago. Patient reports going to the hospital initially for concern of the syncope, and reports being discharged. Several days following, reports severe L knee pain with the inability to weight bear/walking and elected to return to the hospital and was admitted. Patient received an MRI while in the hospital with results listed below. Patient reports since then have an arthrocentesis, and corticosteroid injection that provided good pain relief. Patient ambulates into the clinic without an assistive device with minimal antalgic gait pattern.     Patient reports history of prostate surgery in early January of this year, which is believed to be the cause of the vasovagal episode. Patient denies any cardiac conditions. Will be seeing cardiologist on Thursday this week as a precaution.     Patient lives with his spouse in a single level home with a basement with handrails, and 5 steps to enter with hand-rails. Prior to this incident, patient denies any falls  "in the past year, but reports occasional dizziness.     MRI L Knee 25: \"IMPRESSION:  1. Large knee joint effusion with synovial thickening/synovitis.. There  is a history of fever this could represent septic arthritis in the  proper clinical setting and arthrocentesis may be helpful for further  evaluation.  2. Complex tear of the posterior horn and body medial meniscus with  volume loss and most severely involving the posterior horn/body  junction.  3. Advanced patellofemoral compartment arthritis with full-thickness  cartilage loss at the lateral aspect of the patellofemoral compartment.  Moderate lateral compartment osteoarthritis with cartilage loss  particularly at the central to posterior aspect of the lateral femoral  condyle.\"          Patient Occupation: retired - from construction Pain  Current pain ratin  At best pain ratin  At worst pain rating: 3  Location: medial R knee along joint line  Quality: dull ache, sharp and tight  Relieving factors: ice and medications  Aggravating factors: squatting, standing and stairs (getting to the floor/and return to standing)  Progression: improved    Social Support  Lives in: one-story house  Lives with: spouse    Diagnostic Tests  MRI studies: abnormal    Treatments  Previous treatment: medication and injection treatment  Patient Goals  Patient goals for therapy: decreased edema, decreased pain, improved balance, increased strength and return to sport/leisure activities  Patient goal: improve ability to transfer from the floor       Pertinent PMH: prostate history, hypertension, arthritis, hyperlipidemia, history of skin cancer, heart murmur.    Objective          Observations     Additional Knee Observation Details  No obvious deformity, good muscle development, minimal swelling/edema noted.    Palpation     Additional Palpation Details  Mild tenderness upon palpation to medial joint line of L knee    Active Range of Motion   Left Knee   Flexion: 128 " degrees   Extension: 0 degrees     Right Knee   Flexion: 128 degrees   Extension: 0 degrees     Additional Active Range of Motion Details  Audible/palpable crepitus with active knee ROM bilaterally    Strength/Myotome Testing     Left Hip   Planes of Motion   Flexion: 5  Extension: 5  Abduction: 4  Adduction: 5    Right Hip   Planes of Motion   Flexion: 5  Abduction: 4  Adduction: 5    Left Knee   Flexion: 5  Extension: 5    Right Knee   Flexion: 5  Extension: 5    Left Ankle/Foot   Dorsiflexion: 5  Plantar flexion: 5    Right Ankle/Foot   Dorsiflexion: 5  Plantar flexion: 5    Tests     Left Knee   Negative anterior Lachman, lateral Joesph, medial Joesph, valgus stress test at 0 degrees, valgus stress test at 30 degrees, varus stress test at 0 degrees and varus stress test at 30 degrees.     Left Knee Flexibility Comments:   Appropriate hamstring length bilaterally    Ambulation     Ambulation: Stairs   Ascend stairs: independent  Pattern: reciprocal  Railings: without rails  Descend stairs: independent  Pattern: reciprocal  Railings: without rails    Comments   Patient ambulates on level surface without assistive device with minimal evidence of an antalgic     Functional Assessment     Comments  Squat: able to achieve 90 degrees of knee flexion with audible crepitus, and minimal pain reported    Transfer from the floor to standing: independent with rolling, independent with side-lying to quadruped, independent with quadruped to half kneeling, and independent with half-kneeling to standing with unilateral UE support on chair with mild pain reported.         30 second sit to stand: 9 reps without upper extremity suppor    Assessment & Plan       Assessment  Impairments: abnormal gait, abnormal or restricted ROM, activity intolerance, impaired balance, impaired physical strength, lacks appropriate home exercise program, pain with function, safety issue and weight-bearing intolerance   Functional limitations:  lifting, sleeping, walking, pulling, pushing, uncomfortable because of pain and stooping   Assessment details: The patient is a 69 y.o. male who presents to physical therapy today for L knee pain secondary to fall ~2-3 weeks ago with MRI evidence of complex L knee medial meniscus tear and patellofemoral arthritis.. Upon initial evaluation, the patient demonstrates the following impairments: mild mobility deficits of L knee, functional strength deficits of L lower extremity, mild stability deficits of L knee, and mild ambulation impairments, but demonstrates a stable ligamentous exam of L knee. Due to these impairments, the patient is unable to perform or has difficulty with the following functional tasks: transferring from the floor to standing, transferring from sitting to standing, standing/walking greater than 30 minutes, which limits his ability to participate in typical household chores and caring for his grandchildren. The patient would benefit from skilled PT services to address functional limitations and impairments and to improve patient quality of life.      Prognosis: good    Goals  Plan Goals: SHORT TERM GOALS:    4 weeks  1. Patient to be compliant with stretching and HEP  2. Pt to report 1/10 or less knee pain with transfers and stair climbing.  3. Pt will improve 30 second sit to stand to 10 reps without upper extremity support.  4.  Pt will demonstrate improved gait mechanics demonstrating equal stance time, decreased Trendelenburg and knee ext through midstance    LONG TERM GOALS:    8 weeks  1. Pt. to score < 12% of disability on WOMAC / LEFS  2.  Able to climb 10 steps with reciprocal pattern due to improved pain and strength.  3.  Pt to improve LE endurance/strength in order to tolerate ambulating for 60 min without rest break due to improved hip and knee strength  4. Pt will improve B hip flexion, abduction and extension to 5/5 MMT in order to improve gait and stair climbing as well as decrease  fall risk.  5. Pt to transfer from floor to standing 5/5 times without increase in L knee pain in order to play with his grand kids more easily.          Plan  Therapy options: will be seen for skilled therapy services  Planned modality interventions: cryotherapy, electrical stimulation/Russian stimulation, thermotherapy (hydrocollator packs) and TENS  Other planned modality interventions: Compression Sleeve  Planned therapy interventions: ADL retraining, balance/weight-bearing training, flexibility, functional ROM exercises, gait training, home exercise program, joint mobilization, manual therapy, neuromuscular re-education, postural training, soft tissue mobilization, spinal/joint mobilization, strengthening, stretching and therapeutic activities  Frequency: 2x week  Duration in weeks: 8  Treatment plan discussed with: patient        History # of Personal Factors and/or Comorbidities: LOW (0)  Examination of Body System(s): # of elements: LOW (1-2)  Clinical Presentation: STABLE   Clinical Decision Making: LOW       Timed:         Manual Therapy:         mins  64563;     Therapeutic Exercise:    15     mins  45970;     Neuromuscular Tati:        mins  82924;    Therapeutic Activity:     10     mins  72378;     Gait Training:           mins  64406;     Ultrasound:          mins  72020;    Ionto                                   mins   91933  Self Care                            mins   73474      Un-Timed:  Electrical Stimulation:         mins  26673 ( );  Dry Needling          mins self-pay  Traction          mins 07967  Low Eval     25     Mins  04307  Mod Eval          Mins  03847  High Eval                            Mins  90266  Canalith Repos         mins 55672      Timed Treatment:   25   mins   Total Treatment:     50   mins          PT: Chente Casiano PT     License Number: [place] [id number]# [id number]. [place] LIC# [id number]  Electronically signed by Chente Casiano PT, 01/27/25, 2:47 PM EST    Certification  Period: 1/27/2025 thru 4/26/2025  I certify that the therapy services are furnished while this patient is under my care.  The services outlined above are required by this patient, and will be reviewed every 90 days.         Physician Signature:__________________________________________________    PHYSICIAN: Mireya Paredes APRN  NPI: [id number]                                      DATE:      Please sign and return via fax to [phone]. Thank you, [facility].

## 2025-01-29 ENCOUNTER — TREATMENT (OUTPATIENT)
Dept: PHYSICAL THERAPY | Facility: CLINIC | Age: 70
End: 2025-01-29
Payer: MEDICARE

## 2025-01-29 DIAGNOSIS — R26.89 DECREASED FUNCTIONAL MOBILITY: ICD-10-CM

## 2025-01-29 DIAGNOSIS — R29.898 WEAKNESS OF LEFT LOWER EXTREMITY: ICD-10-CM

## 2025-01-29 DIAGNOSIS — S83.92XA SPRAIN OF LEFT KNEE, UNSPECIFIED LIGAMENT, INITIAL ENCOUNTER: Primary | ICD-10-CM

## 2025-01-29 NOTE — PROGRESS NOTES
Physical Therapy Daily Treatment Note  [facility]  [place] - [address]     [place] [address]   Phone: [phone]   Fax: [phone]      Patient: Jacob Miller   : 1955  Referring practitioner: ROB Turner  Date of Initial Visit: Type: THERAPY  Noted: 2025  Today's Date: 2025  Patient seen for 2 sessions       Visit Diagnoses:    ICD-10-CM ICD-9-CM   1. Sprain of left knee, unspecified ligament, initial encounter  S83.92XA 844.9   2. Decreased functional mobility  R26.89 781.99   3. Weakness of left lower extremity  R29.898 729.89         Subjective:  Jacob Miller reports: having a mild amounts of soreness into the L knee following the initial evaluation, but was not severe. No swelling, edema, or bruising noted. Patient reports completing initial HEP with minimal difficulty or pain.       Objective   See Exercise, Manual, and Modality Logs for complete treatment.       Assessment:  Patient tolerates today's movement interventions well without adverse effects. Patient demonstrates good understanding of HEP. Progressed to standing functional strengthening and stabilization drills to bilateral lower extremities without increase in knee pain. Patient demonstrates mild functional strength deficits into L lower extremity and benefit from skilled physical therapy to improve L knee strength and stability.       Plan:   Progress bilateral lower extremity strength, stability, and progress balance/proprioception drills as tolerated. Consider movement interventions to create greater       Timed:         Manual Therapy:         mins  53376;     Therapeutic Exercise:    16     mins  83230;     Neuromuscular Tati:    14    mins  19678;    Therapeutic Activity:     12     mins  02405;     Gait Training:           mins  64668;     Ultrasound:          mins  30797;    Ionto                                   mins  80432  Self Care                             mins  86528  Traction          mins 89004      Un-Timed:  Canalith Repos         mins 52036  Electrical Stimulation:         mins  80695 ( );  Dry Needling          mins self-pay  Traction          mins 68910        Timed Treatment:   42   mins   Total Treatment:     42   mins    Chente Casiano PT  License Number: [place] [id number]# [id number]. [place] LIC# [id number]    Physical Therapist

## 2025-01-30 ENCOUNTER — OFFICE VISIT (OUTPATIENT)
Dept: CARDIOLOGY | Facility: CLINIC | Age: 70
End: 2025-01-30
Payer: MEDICARE

## 2025-01-30 VITALS
OXYGEN SATURATION: 98 % | WEIGHT: 221 LBS | HEART RATE: 63 BPM | BODY MASS INDEX: 29.29 KG/M2 | HEIGHT: 73 IN | DIASTOLIC BLOOD PRESSURE: 70 MMHG | SYSTOLIC BLOOD PRESSURE: 128 MMHG

## 2025-01-30 DIAGNOSIS — I10 BENIGN ESSENTIAL HYPERTENSION: ICD-10-CM

## 2025-01-30 DIAGNOSIS — I35.1 NONRHEUMATIC AORTIC VALVE INSUFFICIENCY: ICD-10-CM

## 2025-01-30 DIAGNOSIS — G47.33 OBSTRUCTIVE SLEEP APNEA: ICD-10-CM

## 2025-01-30 DIAGNOSIS — R55 VASOVAGAL SYNCOPE: Primary | ICD-10-CM

## 2025-01-30 NOTE — ASSESSMENT & PLAN NOTE
Hypertension is stable and controlled  Continue current treatment regimen.  Regular aerobic exercise.  Ambulatory blood pressure monitoring.  Blood pressure will be reassessed  with PCP .

## 2025-01-30 NOTE — PROGRESS NOTES
"    CARDIOLOGY        Patient Name: Jacob Miller  :1955  Age: 69 y.o.  Primary Cardiologist: Yeyo Garnica MD  Encounter Provider:  ROB Martinez    Date of Service: 2025      CHIEF COMPLAINT / REASON FOR OFFICE VISIT     Hospital Follow Up Visit (Vasovagal Syncope)      HISTORY OF PRESENT ILLNESS       HPI  Jacob Miller is a 69 y.o. male who presents today for hospital follow-up.     Pt has a  history significant for HTN, HLD, palpitations, prostate cancer, EARLINE.    Medical record review reveals patient was hospitalized 2025.  Patient presented with a syncopal episode on 1/10/2025.  He had an unremarkable cardiac workup.  Patient had a prostate surgery 2025, episode was felt to be vasovagal.  Patient did have a echocardiogram which was unremarkable during the hospitalization.  He was evaluated by our service and it was thought that this episode was vasovagal    Patient reports that he has done well since his hospitalization.  He is adequately hydrating.  He has not had any further episodes of lightheadedness, near-syncope or syncope.  He denies chest pain, dyspnea, edema, fatigue.    The following portions of the patient's history were reviewed and updated as appropriate: allergies, current medications, past family history, past medical history, past social history, past surgical history and problem list.      VITAL SIGNS     Visit Vitals  /70   Pulse 63   Ht 185.4 cm (72.99\")   Wt 100 kg (221 lb)   SpO2 98%   BMI 29.17 kg/m²         Wt Readings from Last 3 Encounters:   25 100 kg (221 lb)   25 100 kg (221 lb 1.6 oz)   25 99.8 kg (220 lb 0.3 oz)     Body mass index is 29.17 kg/m².      REVIEW OF SYSTEMS     Review of Systems   Constitutional: Negative for chills, fever, weight gain and weight loss.   Cardiovascular:  Negative for leg swelling.   Respiratory:  Negative for cough, snoring and wheezing.    Hematologic/Lymphatic: Negative for bleeding " problem. Does not bruise/bleed easily.   Skin:  Negative for color change.   Musculoskeletal:  Negative for falls, joint pain and myalgias.   Gastrointestinal:  Negative for melena.   Genitourinary:  Negative for hematuria.   Neurological:  Negative for excessive daytime sleepiness.   Psychiatric/Behavioral:  Negative for depression. The patient is not nervous/anxious.            PHYSICAL EXAMINATION     Constitutional:       Appearance: Normal appearance. Well-developed.   Eyes:      Conjunctiva/sclera: Conjunctivae normal.   Neck:      Vascular: No carotid bruit.   Pulmonary:      Effort: Pulmonary effort is normal.      Breath sounds: Normal breath sounds.   Cardiovascular:      Normal rate. Regular rhythm. Normal S1. Normal S2.       Murmurs: There is no murmur.      No gallop.  No click. No rub.   Edema:     Peripheral edema absent.   Musculoskeletal: Normal range of motion. Skin:     General: Skin is warm and dry.   Neurological:      Mental Status: Alert and oriented to person, place, and time.      GCS: GCS eye subscore is 4. GCS verbal subscore is 5. GCS motor subscore is 6.   Psychiatric:         Speech: Speech normal.         Behavior: Behavior normal.         Thought Content: Thought content normal.         Judgment: Judgment normal.           REVIEWED DATA     Procedures        Lipid Panel          4/10/2024    09:43 10/17/2024    09:33   Lipid Panel   Total Cholesterol 123  123    Triglycerides 177  200    HDL Cholesterol 27  26    VLDL Cholesterol 30  33    LDL Cholesterol  66  64    LDL/HDL Ratio 2.24  2.19        Lab Results   Component Value Date     01/13/2025     01/12/2025    K 3.8 01/13/2025    K 3.8 01/13/2025     01/13/2025    CL 99 01/12/2025    CO2 25.0 01/13/2025    CO2 24.0 01/12/2025    BUN 15 01/13/2025    BUN 14 01/12/2025    CREATININE 0.96 01/13/2025    CREATININE 0.88 01/12/2025    EGFRIFNONA 87 10/20/2021    EGFRIFNONA 90 10/01/2021    EGFRIFAFRI 101 10/20/2021     "EGFRIFAFRI 104 10/01/2021    GLUCOSE 105 (H) 01/13/2025    GLUCOSE 168 (H) 01/12/2025    CALCIUM 8.8 01/13/2025    CALCIUM 9.0 01/12/2025    PROTENTOTREF 7.0 10/17/2024    PROTENTOTREF 7.2 04/10/2024    ALBUMIN 3.6 01/12/2025    ALBUMIN 3.7 01/11/2025    BILITOT 0.5 01/12/2025    BILITOT 0.4 01/11/2025    AST 19 01/12/2025    AST 20 01/11/2025    ALT 22 01/12/2025    ALT 23 01/11/2025     Lab Results   Component Value Date    WBC 9.04 01/13/2025    WBC 9.36 01/12/2025    HGB 12.3 (L) 01/13/2025    HGB 12.4 (L) 01/12/2025    HCT 38.4 01/13/2025    HCT 36.8 (L) 01/12/2025    MCV 83.5 01/13/2025    MCV 81.8 01/12/2025     01/13/2025     01/12/2025     No results found for: \"PROBNP\", \"BNP\"  Lab Results   Component Value Date    TROPONINT 13 01/10/2025     Lab Results   Component Value Date    TSH 1.530 05/24/2022    TSH 1.540 04/13/2021             ASSESSMENT & PLAN     Diagnoses and all orders for this visit:    1. Vasovagal syncope (Primary)  Assessment & Plan:  After a prostate surgery  No further episodes of lightheadedness, near-syncope or syncope.      2. Nonrheumatic aortic valve insufficiency  Assessment & Plan:  Asymptomatic  Mild on echocardiogram      3. Benign essential hypertension  Overview:  Lisinopril HCTZ  Amlodipine  Metoprolol succinate    Assessment & Plan:  Hypertension is stable and controlled  Continue current treatment regimen.  Regular aerobic exercise.  Ambulatory blood pressure monitoring.  Blood pressure will be reassessed  with PCP .      4. Obstructive sleep apnea  Overview:  Formatting of this note might be different from the original.  SEVERE EARLINE, based on AHI/RDI of 30.3 with Split night Study and poor sleep efficiency.    Last Assessment & Plan:   Formatting of this note might be different from the original.  SEVERE EARLINE, based on AHI/RDI of 30.3 with Split night Study and poor sleep efficiency.          Return if symptoms worsen or fail to improve.    Future Appointments  "        Provider Department Center    2/3/2025 9:00 AM Chente Casiano, MANUEL [facility] PHYSICAL THERAPY [facility]    2/5/2025 2:00 PM Chente Casiano PT [facility] PHYSICAL THERAPY [facility]    2/10/2025 9:00 AM Chente Casiano PT [facility] PHYSICAL THERAPY [facility]    2/12/2025 9:30 AM Chente Casiano PT [facility] PHYSICAL THERAPY [facility]    4/24/2025 9:30 AM [facility] PRIMARY CARE [facility]    4/29/2025 9:15 AM Erik Benitez MD [facility] PRIMARY CARE [facility]              MEDICATIONS         Discharge Medications            Accurate as of January 30, 2025  1:06 PM. If you have any questions, ask your nurse or doctor.                Changes to Medications        Instructions Start Date   lisinopril-hydrochlorothiazide 20-12.5 MG per tablet  Commonly known as: PRINZIDE,ZESTORETIC  What changed: when to take this   TAKE 2 TABLETS BY MOUTH EVERY MORNING      rosuvastatin 10 MG tablet  Commonly known as: Crestor  What changed:   when to take this  additional instructions   10 mg, Oral, Daily             Continue These Medications        Instructions Start Date   Alcohol Swabs pads   Clean area of injection as needed 3 times daily.      amLODIPine 10 MG tablet  Commonly known as: NORVASC   10 mg, Oral, Daily      diclofenac 75 MG EC tablet  Commonly known as: VOLTAREN   TAKE 1 TABLET BY MOUTH DAILY      metoprolol succinate XL 50 MG 24 hr tablet  Commonly known as: TOPROL-XL   50 mg, Oral, Daily      ondansetron ODT 4 MG disintegrating tablet  Commonly known as: ZOFRAN-ODT   4 mg, Every 8 Hours PRN      OneTouch Delica Lancets 33G misc   Check glucose 3 times daily      OneTouch Ultra test strip  Generic drug: glucose blood   USE TO TEST FOR BLOOD SUGAR LEVELS ONCE DAILY AS DIRECTED      oxyCODONE-acetaminophen 7.5-325 MG per tablet  Commonly known as: PERCOCET   1 tablet, Every 8 Hours PRN      phenazopyridine 200 MG tablet  Commonly known as: PYRIDIUM   200 mg,  3 Times Daily PRN      triamcinolone 0.1 % cream  Commonly known as: KENALOG                    **Dragon Disclaimer:   Much of this encounter note is an electronic transcription/translation of spoken language to printed text. The electronic translation of spoken language may permit erroneous, or at times, nonsensical words or phrases to be inadvertently transcribed. Although I have reviewed the note for such errors, some may still exist.

## 2025-02-03 ENCOUNTER — TREATMENT (OUTPATIENT)
Dept: PHYSICAL THERAPY | Facility: CLINIC | Age: 70
End: 2025-02-03
Payer: MEDICARE

## 2025-02-03 DIAGNOSIS — R26.89 DECREASED FUNCTIONAL MOBILITY: ICD-10-CM

## 2025-02-03 DIAGNOSIS — S83.92XA SPRAIN OF LEFT KNEE, UNSPECIFIED LIGAMENT, INITIAL ENCOUNTER: Primary | ICD-10-CM

## 2025-02-03 DIAGNOSIS — R29.898 WEAKNESS OF LEFT LOWER EXTREMITY: ICD-10-CM

## 2025-02-03 PROCEDURE — 97110 THERAPEUTIC EXERCISES: CPT

## 2025-02-03 PROCEDURE — 97530 THERAPEUTIC ACTIVITIES: CPT

## 2025-02-03 PROCEDURE — 97112 NEUROMUSCULAR REEDUCATION: CPT

## 2025-02-03 NOTE — PROGRESS NOTES
Physical Therapy Daily Treatment Note  [facility] Physical Therapy  [place] - [address], [address]     [place] [address]   Phone: [phone]   Fax: [phone]      Patient: Jacob Miller   : 1955  Referring practitioner: ROB Turner  Date of Initial Visit: Type: THERAPY  Noted: 2025  Today's Date: 2/3/2025  Patient seen for 3 sessions       Visit Diagnoses:    ICD-10-CM ICD-9-CM   1. Sprain of left knee, unspecified ligament, initial encounter  S83.92XA 844.9   2. Decreased functional mobility  R26.89 781.99   3. Weakness of left lower extremity  R29.898 729.89         Subjective:  Jacob Miller reports: increased soreness into bilateral knee following the last treatment session that lasted 1-2 days. Reports no pain at the start of today's treatment session.       Objective   See Exercise, Manual, and Modality Logs for complete treatment.       Assessment:  Patient tolerates today's movement interventions well without adverse effects. Due to complaints of soreness following the last treatment session, many of his movement interventions were maintained without progressions. However, introduced stair training with step ups fwd/laterally with good stability and no increase in pain/soreness reported. Patient demonstrates good understanding of HEP, and demonstrates good progress from IE.       Plan:   Continue to progress LE strength/stability training to the lower extremities as tolerated      Timed:         Manual Therapy:         mins  17556;     Therapeutic Exercise:    30     mins  95837;     Neuromuscular Tati:    12    mins  32334;    Therapeutic Activity:     12     mins  30203;     Gait Training:           mins  18941;     Ultrasound:          mins  54340;    Ionto                                   mins  91909  Self Care                            mins  43135  Traction          mins 48469      Un-Timed:  Canalith Repos         mins 18669  Electrical  Stimulation:         mins  80388 ( );  Dry Needling          mins self-pay  Traction          mins 90224        Timed Treatment:   54   mins   Total Treatment:     54   mins    Chente Casiano PT  License Number: [place] [id number]# [id number]. [place] LIC# [id number]    Physical Therapist

## 2025-02-05 ENCOUNTER — TREATMENT (OUTPATIENT)
Dept: PHYSICAL THERAPY | Facility: CLINIC | Age: 70
End: 2025-02-05
Payer: MEDICARE

## 2025-02-05 DIAGNOSIS — R29.898 WEAKNESS OF LEFT LOWER EXTREMITY: ICD-10-CM

## 2025-02-05 DIAGNOSIS — S83.92XA SPRAIN OF LEFT KNEE, UNSPECIFIED LIGAMENT, INITIAL ENCOUNTER: Primary | ICD-10-CM

## 2025-02-05 DIAGNOSIS — R26.89 DECREASED FUNCTIONAL MOBILITY: ICD-10-CM

## 2025-02-05 NOTE — PROGRESS NOTES
"Physical Therapy Daily Treatment Note  [facility] Physical Therapy  [place] - [address], [address]     [place] [address]   Phone: [phone]   Fax: [phone]      Patient: Jacob Miller   : 1955  Referring practitioner: ROB Turner  Date of Initial Visit: Type: THERAPY  Noted: 2025  Today's Date: 2025  Patient seen for 4 sessions       Visit Diagnoses:    ICD-10-CM ICD-9-CM   1. Sprain of left knee, unspecified ligament, initial encounter  S83.92XA 844.9   2. Decreased functional mobility  R26.89 781.99   3. Weakness of left lower extremity  R29.898 729.89         Subjective:  Jacob Miller reports: \"not feeling as sore as the first time.\" Patient reports some soreness into bilateral knees, but not severe. No falls since IE.       Objective   See Exercise, Manual, and Modality Logs for complete treatment.       Assessment:  Patient tolerates today's movement interventions well without adverse effects. Focus spent today towards progressing bilateral lower extremity strength and stability, with increased resistance provided today with OKC exercises. Additionally, introduced more dynamic surfaces for proprioception training. Patient demonstrates good progress thus far, and will continue to provide interventions to improve bilateral LE, stability, and proprioception training.      Plan:   Continue to progress LE strength/stability training to the lower extremities as tolerated        Timed:         Manual Therapy:         mins  53883;     Therapeutic Exercise:    18     mins  41010;     Neuromuscular Tati:    12    mins  30059;    Therapeutic Activity:          mins  24286;     Gait Training:           mins  04151;     Ultrasound:          mins  94635;    Ionto                                   mins  22827  Self Care                            mins  75669  Traction          mins 33791      Un-Timed:  Canalith Repos        mins 48650  Electrical Stimulation:    "      mins  52592 ( );  Dry Needling          mins self-pay  Traction          mins 47138        Timed Treatment:   30   mins   Total Treatment:     51   mins    Chente Casiano PT  License Number: [place] LIC# [id number]. [place] LIC# [id number]    Physical Therapist

## 2025-02-10 ENCOUNTER — TREATMENT (OUTPATIENT)
Dept: PHYSICAL THERAPY | Facility: CLINIC | Age: 70
End: 2025-02-10
Payer: MEDICARE

## 2025-02-10 DIAGNOSIS — S83.92XA SPRAIN OF LEFT KNEE, UNSPECIFIED LIGAMENT, INITIAL ENCOUNTER: Primary | ICD-10-CM

## 2025-02-10 DIAGNOSIS — R26.89 DECREASED FUNCTIONAL MOBILITY: ICD-10-CM

## 2025-02-10 DIAGNOSIS — R29.898 WEAKNESS OF LEFT LOWER EXTREMITY: ICD-10-CM

## 2025-02-10 NOTE — PROGRESS NOTES
"Physical Therapy Daily Treatment Note  [facility] Physical Therapy  [place] - [address], [address]     [place] [address]   Phone: [phone]   Fax: [phone]      Patient: Jacob Miller   : 1955  Referring practitioner: ROB Turner  Date of Initial Visit: Type: THERAPY  Noted: 2025  Today's Date: 2/10/2025  Patient seen for 5 sessions       Visit Diagnoses:    ICD-10-CM ICD-9-CM   1. Sprain of left knee, unspecified ligament, initial encounter  S83.92XA 844.9   2. Decreased functional mobility  R26.89 781.99   3. Weakness of left lower extremity  R29.898 729.89         Subjective:  Jacob Miller reports: mild amounts of soreness following the last visit, but not severe. \"Getting up from a kneeling position continues to be tough for me.\"      Objective   See Exercise, Manual, and Modality Logs for complete treatment.       Assessment:  Patient tolerates today's movement interventions well without adverse effects. Progressed resistance with table exercises to improve strength of bilateral quads and glutes. Patient continues to demonstrate difficulty with closed chain quad eccentric control, with introduction of half-kneeling to 8in step with difficulty reported. Audible crepitus noted with closed chain squats/lunges. Patient demonstrates growing understanding of HEP.       Plan:   Continue to progress LE strength/stability training to the lower extremities as tolerated      Timed:         Manual Therapy:         mins  49974;     Therapeutic Exercise:    18     mins  80677;     Neuromuscular Tati:    2    mins  11443;    Therapeutic Activity:     12     mins  55822;     Gait Training:           mins  45531;     Ultrasound:          mins  91853;    Ionto                                   mins  13739  Self Care                            mins  99473  Traction          mins 98565      Un-Timed:  Canalith Repos         mins 77582  Electrical Stimulation:         mins "  24259 ( );  Dry Needling          mins self-pay  Traction          mins 39399        Timed Treatment:   34   mins   Total Treatment:     52   mins    Chente Casiano PT  License Number: [place] [id number]# [id number]. [place] LIC# [id number]    Physical Therapist

## 2025-02-12 ENCOUNTER — TREATMENT (OUTPATIENT)
Dept: PHYSICAL THERAPY | Facility: CLINIC | Age: 70
End: 2025-02-12
Payer: MEDICARE

## 2025-02-12 DIAGNOSIS — R26.89 DECREASED FUNCTIONAL MOBILITY: ICD-10-CM

## 2025-02-12 DIAGNOSIS — R29.898 WEAKNESS OF LEFT LOWER EXTREMITY: ICD-10-CM

## 2025-02-12 DIAGNOSIS — S83.92XA SPRAIN OF LEFT KNEE, UNSPECIFIED LIGAMENT, INITIAL ENCOUNTER: Primary | ICD-10-CM

## 2025-02-12 NOTE — PROGRESS NOTES
"Physical Therapy Daily Treatment Note  [facility]  [place] - [address], [address]     [place] [address]   Phone: [phone]   Fax: [phone]      Patient: Jacob Miller   : 1955  Referring practitioner: ROB Turner  Date of Initial Visit: Type: THERAPY  Noted: 2025  Today's Date: 2025  Patient seen for 6 sessions       Visit Diagnoses:    ICD-10-CM ICD-9-CM   1. Sprain of left knee, unspecified ligament, initial encounter  S83.92XA 844.9   2. Decreased functional mobility  R26.89 781.99   3. Weakness of left lower extremity  R29.898 729.89         Subjective:  Jacob Miller reports: \"Not feeling as sore as I did before.\" Patient reports no pain at the start of physical therapy today. No new complaints.      Objective   See Exercise, Manual, and Modality Logs for complete treatment.       Assessment:  Patient tolerates today's movement interventions well without adverse effects. Patient continues to demonstrate difficulty with closed chain quad control with crepitus with squats. Progressed open kinetic chain strengthening with progressions in resistance today to 3# ankle weights. Patient demonstrates good progressions towards established therapy goals today.       Plan:   Continue to progress LE strength/stability training to the lower extremities as tolerated        Timed:         Manual Therapy:         mins  22042;     Therapeutic Exercise:    28     mins  33496;     Neuromuscular Tati:    10    mins  28391;    Therapeutic Activity:     16     mins  72869;     Gait Training:           mins  27941;     Ultrasound:          mins  43162;    Ionto                                   mins  32194  Self Care                            mins  10293  Traction          mins 00717      Un-Timed:  Canalith Repos         mins 26050  Electrical Stimulation:         mins  02332 ( );  Dry Needling          mins self-pay  Traction          mins " 73586        Timed Treatment:   54   mins   Total Treatment:     54   mins    Chente Casiano PT  License Number: [place] [id number]# [id number]. [place] LIC# [id number]    Physical Therapist

## 2025-02-17 DIAGNOSIS — M17.0 PRIMARY OSTEOARTHRITIS OF BOTH KNEES: ICD-10-CM

## 2025-02-17 DIAGNOSIS — M25.562 ACUTE PAIN OF LEFT KNEE: Primary | ICD-10-CM

## 2025-02-17 RX ORDER — DICLOFENAC SODIUM 75 MG/1
75 TABLET, DELAYED RELEASE ORAL DAILY
Qty: 30 TABLET | Refills: 1 | Status: SHIPPED | OUTPATIENT
Start: 2025-02-17

## 2025-02-18 ENCOUNTER — TREATMENT (OUTPATIENT)
Dept: PHYSICAL THERAPY | Facility: CLINIC | Age: 70
End: 2025-02-18
Payer: MEDICARE

## 2025-02-18 DIAGNOSIS — S83.92XA SPRAIN OF LEFT KNEE, UNSPECIFIED LIGAMENT, INITIAL ENCOUNTER: Primary | ICD-10-CM

## 2025-02-18 DIAGNOSIS — R26.89 DECREASED FUNCTIONAL MOBILITY: ICD-10-CM

## 2025-02-18 DIAGNOSIS — R29.898 WEAKNESS OF LEFT LOWER EXTREMITY: ICD-10-CM

## 2025-02-18 PROCEDURE — 97110 THERAPEUTIC EXERCISES: CPT

## 2025-02-18 PROCEDURE — 97530 THERAPEUTIC ACTIVITIES: CPT

## 2025-02-18 NOTE — PROGRESS NOTES
"Physical Therapy Daily Treatment Note  [facility] Physical Therapy  [place] - [address], [address]     [place] [address]   Phone: [phone]   Fax: [phone]      Patient: Jacob Miller   : 1955  Referring practitioner: ROB Turner  Date of Initial Visit: Type: THERAPY  Noted: 2025  Today's Date: 2025  Patient seen for 7 sessions       Visit Diagnoses:    ICD-10-CM ICD-9-CM   1. Sprain of left knee, unspecified ligament, initial encounter  S83.92XA 844.9   2. Decreased functional mobility  R26.89 781.99   3. Weakness of left lower extremity  R29.898 729.89         Subjective:  Jacob Miller reports: overall feeling okay, minimal amounts of knee soreness following last treatment session. \"Getting up from the ground feels a little bit easier.\"      Objective   See Exercise, Manual, and Modality Logs for complete treatment.       Assessment:  Patient tolerates today's movement interventions well without adverse effects. Introduced additional closed chain exercises, and increased resistance with leg press, and increased distance with lateral stepping. Patient with audible crepitus with closed chain squats. Patient continues to respond favorably to gradual progressions in resistance training to bilateral lower extremities.        Plan:   Continue to progress LE strength/stability training to the lower extremities as tolerated        Timed:         Manual Therapy:         mins  99192;     Therapeutic Exercise:    18     mins  01775;     Neuromuscular Tati:    2    mins  09461;    Therapeutic Activity:     10     mins  87740;     Gait Training:           mins  29101;     Ultrasound:          mins  02159;    Ionto                                   mins  41677  Self Care                            mins  22247  Traction          mins 94259      Un-Timed:  Canalith Repos         mins 20479  Electrical Stimulation:         mins  51737 ( );  Dry Needling        "   mins self-pay  Traction          mins 15257        Timed Treatment:   30   mins   Total Treatment:     58   mins    Chente Casiano PT  License Number: [place] [id number]# [id number]. [place] LIC# [id number]    Physical Therapist

## 2025-02-20 ENCOUNTER — TREATMENT (OUTPATIENT)
Dept: PHYSICAL THERAPY | Facility: CLINIC | Age: 70
End: 2025-02-20
Payer: MEDICARE

## 2025-02-20 DIAGNOSIS — R26.89 DECREASED FUNCTIONAL MOBILITY: ICD-10-CM

## 2025-02-20 DIAGNOSIS — R29.898 WEAKNESS OF LEFT LOWER EXTREMITY: ICD-10-CM

## 2025-02-20 DIAGNOSIS — S83.92XA SPRAIN OF LEFT KNEE, UNSPECIFIED LIGAMENT, INITIAL ENCOUNTER: Primary | ICD-10-CM

## 2025-02-20 NOTE — PROGRESS NOTES
Physical Therapy Daily Treatment Note  [facility]  [place] - [address]     [place] [address]   Phone: [phone]   Fax: [phone]      Patient: Jacob Miller   : 1955  Referring practitioner: ROB Turner  Date of Initial Visit: Type: THERAPY  Noted: 2025  Today's Date: 2025  Patient seen for 8 sessions       Visit Diagnoses:    ICD-10-CM ICD-9-CM   1. Sprain of left knee, unspecified ligament, initial encounter  S83.92XA 844.9   2. Decreased functional mobility  R26.89 781.99   3. Weakness of left lower extremity  R29.898 729.89         Subjective:  Jacbo Miller reports: knees are feeling okay, only minimal amounts of muscle soreness following last treatment session. No new complaints.       Objective   See Exercise, Manual, and Modality Logs for complete treatment.       Assessment:  Patient tolerates today's movement interventions well without adverse effects. Progressed closed chain strengthening with increased resistance and distance of functional strengthening. Patient continues to demonstrate gradual progress, but was challenged by today's movement interventions. Patient reports no increase in knee pain following today's movement interventions.      Plan:   Continue to progress LE strength/stability training to the lower extremities as tolerated        Timed:         Manual Therapy:         mins  34710;     Therapeutic Exercise:    25     mins  44068;     Neuromuscular Tati:    12    mins  16879;    Therapeutic Activity:     20     mins  51126;     Gait Training:           mins  53639;     Ultrasound:          mins  67722;    Ionto                                   mins  09025  Self Care                            mins  78722  Traction          mins 20302      Un-Timed:  Canalith Repos         mins 28325  Electrical Stimulation:         mins  24247 ( );  Dry Needling          mins self-pay  Traction          mins  90195        Timed Treatment:   57   mins   Total Treatment:     57   mins    Chente Casiano PT  License Number: [place] [id number]# [id number]. [place] LIC# [id number]    Physical Therapist

## 2025-02-25 ENCOUNTER — TREATMENT (OUTPATIENT)
Dept: PHYSICAL THERAPY | Facility: CLINIC | Age: 70
End: 2025-02-25
Payer: MEDICARE

## 2025-02-25 DIAGNOSIS — S83.92XA SPRAIN OF LEFT KNEE, UNSPECIFIED LIGAMENT, INITIAL ENCOUNTER: Primary | ICD-10-CM

## 2025-02-25 DIAGNOSIS — R29.898 WEAKNESS OF LEFT LOWER EXTREMITY: ICD-10-CM

## 2025-02-25 DIAGNOSIS — R26.89 DECREASED FUNCTIONAL MOBILITY: ICD-10-CM

## 2025-02-25 NOTE — PROGRESS NOTES
Physical Therapy Daily Treatment Note  [facility] Physical Therapy  [place] - [address]     [place] [address]   Phone: [phone]   Fax: [phone]      Patient: Jacob Miller   : 1955  Referring practitioner: ROB Turner  Date of Initial Visit: Type: THERAPY  Noted: 2025  Today's Date: 2025  Patient seen for 9 sessions       Visit Diagnoses:    ICD-10-CM ICD-9-CM   1. Sprain of left knee, unspecified ligament, initial encounter  S83.92XA 844.9   2. Decreased functional mobility  R26.89 781.99   3. Weakness of left lower extremity  R29.898 729.89         Subjective:  Jacob Miller reports: decreased amounts of soreness into bilateral knees over the weekend. Patient reports things that used to be sore (stairs, getting to the floor, etc...), have become much easier. Minimal to no pain at the start of today's treatment session.       Objective   See Exercise, Manual, and Modality Logs for complete treatment.       Assessment:  Patient tolerates today's movement interventions well without adverse effects. Continues to progress resistance exercises to bilateral glutes and quads without increase in knee pain reported. Continues to demonstrate mild mobility deficits into bilateral knees, and mild muscle power deficits. Demonstrates improvements in functional mobility, with greater ease with navigating stairs and transferring from floor to standing with greater ease reported.       Plan:   Continue to progress LE strength/stability training to the lower extremities as tolerated      Timed:         Manual Therapy:         mins  41884;     Therapeutic Exercise:    18     mins  01783;     Neuromuscular Tati:   2     mins  74128;    Therapeutic Activity:     10     mins  44104;     Gait Training:           mins  64759;     Ultrasound:          mins  62188;    Ionto                                   mins  40557  Self Care                            mins   47128  Traction          mins 01863      Un-Timed:  Canalith Repos         mins 38143  Electrical Stimulation:         mins  27562 ( );  Dry Needling          mins self-pay  Traction          mins 03343        Timed Treatment:   30   mins   Total Treatment:     52   mins    Chente Casiano PT  License Number: [place] LIC# [id number]. [place] LIC# [id number]    Physical Therapist

## 2025-02-27 ENCOUNTER — TREATMENT (OUTPATIENT)
Dept: PHYSICAL THERAPY | Facility: CLINIC | Age: 70
End: 2025-02-27
Payer: MEDICARE

## 2025-02-27 DIAGNOSIS — R26.89 DECREASED FUNCTIONAL MOBILITY: ICD-10-CM

## 2025-02-27 DIAGNOSIS — S83.92XA SPRAIN OF LEFT KNEE, UNSPECIFIED LIGAMENT, INITIAL ENCOUNTER: Primary | ICD-10-CM

## 2025-02-27 DIAGNOSIS — R29.898 WEAKNESS OF LEFT LOWER EXTREMITY: ICD-10-CM

## 2025-02-27 NOTE — PROGRESS NOTES
Physical Therapy Daily Treatment Note  [facility]  [place] - [address], [address]     [place] [address]   Phone: [phone]   Fax: [phone]      Patient: Jacob Miller   : 1955  Referring practitioner: ROB Turner  Date of Initial Visit: Type: THERAPY  Noted: 2025  Today's Date: 2025  Patient seen for 10 sessions       Visit Diagnoses:    ICD-10-CM ICD-9-CM   1. Sprain of left knee, unspecified ligament, initial encounter  S83.92XA 844.9   2. Decreased functional mobility  R26.89 781.99   3. Weakness of left lower extremity  R29.898 729.89     Subjective Questionnaire: WOMAC: 18%     Subjective:  Jacob Miller reports: knee pain continues to feel improved. Patient reports 80% improvement in overall symptoms. Reports greater ease with transfers from sitting to standing, and is having less amounts of pain with navigating stairs. Has been consistent with HEP. Reports able to do light yard work yesterday with minimal to no knee pain. No episodes of catching, clicking, or locking of either knee. No loss of balance or falls reported since the start of PT.       Objective   See Exercise, Manual, and Modality Logs for complete treatment.     Palpation      Additional Palpation Details  Mild tenderness upon palpation to medial joint line of L knee     Active Range of Motion   Left Knee   Flexion: 132 degrees   Extension: 0 degrees      Right Knee   Flexion: 132 degrees   Extension: 0 degrees      Additional Active Range of Motion Details  Audible/palpable crepitus with active knee ROM bilaterally     Strength/Myotome Testing      Left Hip   Planes of Motion   Flexion: 5  Extension: 5  Abduction: 5  Adduction: 5     Right Hip   Planes of Motion   Flexion: 5  Extension: 5  Abduction: 5  Adduction: 5     Left Knee   Flexion: 5  Extension: 5     Right Knee   Flexion: 5  Extension: 5     Left Ankle/Foot   Dorsiflexion: 5  Plantar flexion: 5     Right  Ankle/Foot   Dorsiflexion: 5  Plantar flexion: 5     Tests      Left Knee   Negative anterior Lachman, lateral Joesph, medial Joesph, valgus stress test at 0 degrees, valgus stress test at 30 degrees, varus stress test at 0 degrees and varus stress test at 30 degrees.      Left Knee Flexibility Comments:   Appropriate hamstring length bilaterally     Ambulation      Ambulation: Stairs   Ascend stairs: independent  Pattern: reciprocal  Railings: without rails  Descend stairs: independent  Pattern: reciprocal  Railings: without rails     Comments   Patient ambulates on level surface without assistive device with minimal evidence of an antalgic      Functional Assessment      Comments  Squat: able to achieve 90 degrees of knee flexion with audible crepitus, and no pain reported     Transfer from the floor to standing: independent with rolling, independent with side-lying to quadruped, independent with quadruped to half kneeling, and independent with half-kneeling to standing without upper extremity support and no pain reported throughout.        30 second sit to stand: 12 reps without upper extremity support    Assessment:  Patient is a 68 y/o male who presents to his 10th physical therapy visit with diagnosis of meniscus tear of R knee. Patient demonstrates improvements in bilateral knee ROM, and strength into bilateral lower extremities. Patient with greater ease and less pain reported with many functional mobility such as navigating stairs, and transfers from sitting to standing. Patient has met all short term goals and is progressing nicely towards remaining long term goals.     Plan Goals: SHORT TERM GOALS:    4 weeks  1. Patient to be compliant with stretching and HEP  2. Pt to report 1/10 or less knee pain with transfers and stair climbing.  3. Pt will improve 30 second sit to stand to 10 reps without upper extremity support.  4.  Pt will demonstrate improved gait mechanics demonstrating equal stance time,  decreased Trendelenburg and knee ext through midstance     LONG TERM GOALS:    8 weeks  1. Pt. to score < 12% of disability on WOMAC / LEFS  2.  Able to climb 10 steps with reciprocal pattern due to improved pain and strength.  3.  Pt to improve LE endurance/strength in order to tolerate ambulating for 60 min without rest break due to improved hip and knee strength  4. Pt will improve B hip flexion, abduction and extension to 5/5 MMT in order to improve gait and stair climbing as well as decrease fall risk.  5. Pt to transfer from floor to standing 5/5 times without increase in L knee pain in order to play with his grand kids more easily.      Plan:   Continue to focus at improving bilateral lower extremity strength and ROM. Progress functional strengthening as tolerated. Decrease frequency to 1x/week.        Timed:         Manual Therapy:         mins  46153;     Therapeutic Exercise:    20     mins  16381;     Neuromuscular Tati:        mins  41985;    Therapeutic Activity:     12     mins  65342;     Gait Training:           mins  26693;     Ultrasound:          mins  71145;    Ionto                                   mins  76930  Self Care                            mins  36437  Traction          mins 49499      Un-Timed:  Canalith Repos         mins 43886  Electrical Stimulation:         mins  81186 ( );  Dry Needling          mins self-pay  Traction          mins 90013        Timed Treatment:   32   mins   Total Treatment:     48   mins    Chente Casiano PT  License Number: [place] [id number]# [id number]. [place] LIC# [id number]    Physical Therapist

## 2025-03-03 ENCOUNTER — TREATMENT (OUTPATIENT)
Dept: PHYSICAL THERAPY | Facility: CLINIC | Age: 70
End: 2025-03-03
Payer: MEDICARE

## 2025-03-03 DIAGNOSIS — S83.92XA SPRAIN OF LEFT KNEE, UNSPECIFIED LIGAMENT, INITIAL ENCOUNTER: Primary | ICD-10-CM

## 2025-03-03 DIAGNOSIS — R26.89 DECREASED FUNCTIONAL MOBILITY: ICD-10-CM

## 2025-03-03 DIAGNOSIS — R29.898 WEAKNESS OF LEFT LOWER EXTREMITY: ICD-10-CM

## 2025-03-03 NOTE — PROGRESS NOTES
Physical Therapy Daily Treatment Note    [facility] [place]  [address], [address]  [place] [address]    Visit # 11        Patient: Jacob Miller   : 1955  Referring practitioner: ROB Turner  Date of Initial Evaluation:  Type: THERAPY  Noted: 2025  Today's Date: 3/3/2025           ICD-10-CM ICD-9-CM   1. Sprain of left knee, unspecified ligament, initial encounter  S83.92XA 844.9   2. Decreased functional mobility  R26.89 781.99   3. Weakness of left lower extremity  R29.898 729.89       Subjective  Jacob Miller reports:   No new c/o today vs status at last Pt visit.        Objective   See Exercise, Manual, and Modality Logs for complete treatment     Pt Education:  HEP review - reviewed band resisted exercises, discussed long-term suggestion of continuing program at gym - pt notes has [facility] program available through insurance.   Exercise rationale/ pain free exercise performance  Posture/Postural awareness  Alternate exercise positions    Assessment/Plan  Tolerated continued progression of therapeutic exercise/therapeutic activity/neuromuscular re-ed well today, no increased pain reported during or after session.      Verbal cues were provided throughout for proper techniques and to avoid compensations.     Would continue to benefit from skilled PT progressing with ROM / functional UE strengthening, with progression toward functional WB as tolerated.     Progress per Plan of Care and Progress strengthening /stabilization /functional activity             Timed:         Manual Therapy:         mins  93261     Therapeutic Exercise:     15    mins  67932     Neuromuscular Tati:    10    mins  36649    Therapeutic Activity:      5    mins  03562     Gait Training:           mins  31529     Ultrasound:          mins  29883    Ionto                                   mins  65595  Self Care                            mins   05532    Un-Timed:  Electrical Stimulation:         mins 28870 ( )  Traction          mins 58852    Timed Treatment:   30   mins   Total Treatment:     45   mins       GABBI Murcia License #[id number]  Physical Therapist Assistant

## 2025-03-11 ENCOUNTER — TREATMENT (OUTPATIENT)
Dept: PHYSICAL THERAPY | Facility: CLINIC | Age: 70
End: 2025-03-11
Payer: MEDICARE

## 2025-03-11 DIAGNOSIS — S83.92XA SPRAIN OF LEFT KNEE, UNSPECIFIED LIGAMENT, INITIAL ENCOUNTER: Primary | ICD-10-CM

## 2025-03-11 DIAGNOSIS — R29.898 WEAKNESS OF LEFT LOWER EXTREMITY: ICD-10-CM

## 2025-03-11 DIAGNOSIS — R26.89 DECREASED FUNCTIONAL MOBILITY: ICD-10-CM

## 2025-03-14 RX ORDER — ROSUVASTATIN CALCIUM 10 MG/1
10 TABLET, COATED ORAL DAILY
Qty: 90 TABLET | Refills: 1 | Status: SHIPPED | OUTPATIENT
Start: 2025-03-14

## 2025-03-14 RX ORDER — AMLODIPINE BESYLATE 10 MG/1
10 TABLET ORAL DAILY
Qty: 90 TABLET | Refills: 0 | Status: SHIPPED | OUTPATIENT
Start: 2025-03-14

## 2025-03-14 RX ORDER — LISINOPRIL AND HYDROCHLOROTHIAZIDE 12.5; 2 MG/1; MG/1
2 TABLET ORAL EVERY MORNING
Qty: 180 TABLET | Refills: 1 | Status: SHIPPED | OUTPATIENT
Start: 2025-03-14

## 2025-03-14 NOTE — TELEPHONE ENCOUNTER
Caller: Paul Jacob T    Relationship: Self    Best call back number: [phone]     Requested Prescriptions:   Requested Prescriptions     Pending Prescriptions Disp Refills    amLODIPine (NORVASC) 10 MG tablet 90 tablet 0     Sig: Take 1 tablet by mouth Daily.        Pharmacy where request should be sent: [facility] [phone] [facility] [address] AT [facility] [phone] [facility] [phone] FX     Last office visit with prescribing clinician: 1/20/2025   Last telemedicine visit with prescribing clinician: Visit date not found   Next office visit with prescribing clinician: 4/29/2025     Additional details provided by patient: PATIENT STATES THAT HE ACCIDENTALLY THREW AWAY HIS MEDICATION AND IS NOT DUE FOR A REFILL AND WOULD LIKE TO GET A REFILL ON THIS.     Does the patient have less than a 3 day supply:  [x] Yes  [] No    Would you like a call back once the refill request has been completed: [] Yes [x] No    If the office needs to give you a call back, can they leave a voicemail: [] Yes [x] No    Katarina Novoa Rep   03/14/25 08:48 EDT

## 2025-03-18 ENCOUNTER — TREATMENT (OUTPATIENT)
Dept: PHYSICAL THERAPY | Facility: CLINIC | Age: 70
End: 2025-03-18
Payer: MEDICARE

## 2025-03-18 DIAGNOSIS — R26.89 DECREASED FUNCTIONAL MOBILITY: ICD-10-CM

## 2025-03-18 DIAGNOSIS — S83.92XA SPRAIN OF LEFT KNEE, UNSPECIFIED LIGAMENT, INITIAL ENCOUNTER: Primary | ICD-10-CM

## 2025-03-18 DIAGNOSIS — R29.898 WEAKNESS OF LEFT LOWER EXTREMITY: ICD-10-CM

## 2025-03-18 NOTE — PROGRESS NOTES
"Physical Therapy Daily Treatment Note  [facility]  [place] - [address], [address]     [place] [address]   Phone: [phone]   Fax: [phone]      Patient: Jacob Miller   : 1955  Referring practitioner: ROB Turner  Date of Initial Visit: Type: THERAPY  Noted: 2025  Today's Date: 3/18/2025  Patient seen for 13 sessions       Visit Diagnoses:    ICD-10-CM ICD-9-CM   1. Sprain of left knee, unspecified ligament, initial encounter  S83.92XA 844.9   2. Decreased functional mobility  R26.89 781.99   3. Weakness of left lower extremity  R29.898 729.89         Subjective:  Jacob Miller reports: \"I'm feeling close to normal. Doing stairs, and playing with my grand kids without really having to think about it.\"      Objective   See Exercise, Manual, and Modality Logs for complete treatment.       Assessment:  Patient tolerates today's movement interventions well without adverse effects. Continue to provide interventions focusing at improving bilateral knee mobility, quad strength, and glute strength and each is tolerated well with muscle fatigue reported with no increase in knee pain. He is demonstrating improving knowledge of HEP and is progressing well towards d/c from skilled therapy.       Plan:   D/c next visit, barring any setbacks      Timed:         Manual Therapy:         mins  32397;     Therapeutic Exercise:    28     mins  19929;     Neuromuscular Tati:    14    mins  22332;    Therapeutic Activity:     18     mins  82970;     Gait Training:           mins  52975;     Ultrasound:          mins  10074;    Ionto                                   mins  66990  Self Care                            mins  63855  Traction          mins 82699      Un-Timed:  Canalith Repos         mins 83155  Electrical Stimulation:         mins  29964 (MC );  Dry Needling          mins self-pay  Traction          mins 88740        Timed Treatment:   60   mins "   Total Treatment:     60   mins    Chente Casiano PT  License Number: [place] [id number]# [id number]. [place] LIC# [id number]    Physical Therapist

## 2025-03-26 ENCOUNTER — TREATMENT (OUTPATIENT)
Dept: PHYSICAL THERAPY | Facility: CLINIC | Age: 70
End: 2025-03-26
Payer: MEDICARE

## 2025-03-26 DIAGNOSIS — R26.89 DECREASED FUNCTIONAL MOBILITY: ICD-10-CM

## 2025-03-26 DIAGNOSIS — R29.898 WEAKNESS OF LEFT LOWER EXTREMITY: ICD-10-CM

## 2025-03-26 DIAGNOSIS — S83.92XA SPRAIN OF LEFT KNEE, UNSPECIFIED LIGAMENT, INITIAL ENCOUNTER: Primary | ICD-10-CM

## 2025-03-26 NOTE — PROGRESS NOTES
Physical Therapy Daily Treatment Note  [facility]  [place] - [address], [address]     [place] [address]   Phone: [phone]   Fax: [phone]      Patient: Jacob Miller   : 1955  Referring practitioner: ROB Turner  Date of Initial Visit: Type: THERAPY  Noted: 2025  Today's Date: 3/26/2025  Patient seen for 14 sessions       Visit Diagnoses:    ICD-10-CM ICD-9-CM   1. Sprain of left knee, unspecified ligament, initial encounter  S83.92XA 844.9   2. Decreased functional mobility  R26.89 781.99   3. Weakness of left lower extremity  R29.898 729.89         Subjective:  Jacob Miller reports: 100% improvement in overall symptoms. Has been consistent with HEP. Reports greater ease with transfers from the floor to standing, and no pain with navigating stairs and transfers from sitting to standing.       Objective   See Exercise, Manual, and Modality Logs for complete treatment.     Palpation      Additional Palpation Details  No tenderness reported     Active Range of Motion   Left Knee   Flexion: 132 degrees   Extension: 0 degrees      Right Knee   Flexion: 132 degrees   Extension: 0 degrees      Additional Active Range of Motion Details  Audile crepitus with weightbearing squats.      Strength/Myotome Testing      Left Hip   Planes of Motion   Flexion: 5  Extension: 5  Abduction: 5  Adduction: 5     Right Hip   Planes of Motion   Flexion: 5  Abduction: 5  Adduction: 5     Left Knee   Flexion: 5  Extension: 5     Right Knee   Flexion: 5  Extension: 5     Left Ankle/Foot   Dorsiflexion: 5  Plantar flexion: 5     Right Ankle/Foot   Dorsiflexion: 5  Plantar flexion: 5     Tests      Left Knee   Negative anterior Lachman, lateral Joesph, medial Joesph, valgus stress test at 0 degrees, valgus stress test at 30 degrees, varus stress test at 0 degrees and varus stress test at 30 degrees.      Left Knee Flexibility Comments:   Appropriate hamstring  length bilaterally     Ambulation      Ambulation: Stairs   Ascend stairs: independent  Pattern: reciprocal  Railings: without rails  Descend stairs: independent  Pattern: reciprocal  Railings: without rails     Comments   Patient ambulates on level surface without assistive device and no antalgic gait pattern noted.     Functional Assessment      Comments  Squat: able to achieve 100 degrees of knee flexion with audible crepitus, and no pain reported     Transfer from the floor to standing: independent with transfer from standing to supine and returning to standing without UE support on chair. No pain reported.     30 second sit to stand: 14 reps without upper extremity suppor    Assessment:  Patient is a 68 y/o male who presents to physical therapy with the diagnosis of R meniscus tear secondary to a fall several months ago. Patient demonstrates 5/5 bilateral lower extremity strength, demonstrates 0-132 AROM of bilateral knees. Improved 30 second sit to stand to 14 reps, demonstrates normal gait pattern and is able to transfer from the floor to standing independently and without pain. Patient demonstrates excellent understanding of HEP and is to be d/c from skilled physical therapy for independent completion of HEP at this time.     Plan Goals: SHORT TERM GOALS:    4 weeks  1. Patient to be compliant with stretching and HEP - MET  2. Pt to report 1/10 or less knee pain with transfers and stair climbing. - MET  3. Pt will improve 30 second sit to stand to 10 reps without upper extremity support. - MET  4.  Pt will demonstrate improved gait mechanics demonstrating equal stance time, decreased Trendelenburg and knee ext through midstance - MET     LONG TERM GOALS:    8 weeks  1. Pt. to score < 12% of disability on WOMAC  - MET  2.  Able to climb 10 steps with reciprocal pattern due to improved pain and strength. - MET  3.  Pt to improve LE endurance/strength in order to tolerate ambulating for 60 min without rest  break due to improved hip and knee strength - MET  4. Pt will improve B hip flexion, abduction and extension to 5/5 MMT in order to improve gait and stair climbing as well as decrease fall risk. - MET  5. Pt to transfer from floor to standing 5/5 times without increase in L knee pain in order to play with his grand kids more easily.  - MET    Plan:   D/c      Timed:         Manual Therapy:         mins  23949;     Therapeutic Exercise:    16     mins  46643;     Neuromuscular Tati:        mins  38778;    Therapeutic Activity:     14     mins  54293;     Gait Training:           mins  80414;     Ultrasound:          mins  75583;    Ionto                                   mins  46091  Self Care                            mins  86429  Traction          mins 38604      Un-Timed:  Canalith Repos         mins 63185  Electrical Stimulation:         mins  22188 ( );  Dry Needling          mins self-pay  Traction          mins 88096        Timed Treatment:   30   mins   Total Treatment:     45   mins    Chente Casiano PT  License Number: [place] [id number]# [id number]. [place] LIC# [id number]    Physical Therapist

## 2025-04-16 DIAGNOSIS — M17.0 PRIMARY OSTEOARTHRITIS OF BOTH KNEES: ICD-10-CM

## 2025-04-16 DIAGNOSIS — M25.562 ACUTE PAIN OF LEFT KNEE: ICD-10-CM

## 2025-04-18 RX ORDER — DICLOFENAC SODIUM 75 MG/1
75 TABLET, DELAYED RELEASE ORAL DAILY
Qty: 30 TABLET | Refills: 1 | Status: SHIPPED | OUTPATIENT
Start: 2025-04-18

## 2025-04-21 DIAGNOSIS — R73.9 HYPERGLYCEMIA: ICD-10-CM

## 2025-04-21 DIAGNOSIS — E78.00 HYPERCHOLESTEROLEMIA: Primary | ICD-10-CM

## 2025-04-21 DIAGNOSIS — I10 BENIGN ESSENTIAL HYPERTENSION: ICD-10-CM

## 2025-04-25 LAB
ALBUMIN SERPL-MCNC: 4.6 G/DL (ref 3.5–5.2)
ALBUMIN/GLOB SERPL: 1.9 G/DL
ALP SERPL-CCNC: 86 U/L (ref 39–117)
ALT SERPL-CCNC: 35 U/L (ref 1–41)
APPEARANCE UR: ABNORMAL
AST SERPL-CCNC: 22 U/L (ref 1–40)
BACTERIA #/AREA URNS HPF: ABNORMAL /HPF
BASOPHILS # BLD AUTO: 0.05 10*3/MM3 (ref 0–0.2)
BASOPHILS NFR BLD AUTO: 0.9 % (ref 0–1.5)
BILIRUB SERPL-MCNC: 0.6 MG/DL (ref 0–1.2)
BILIRUB UR QL STRIP: NEGATIVE
BUN SERPL-MCNC: 21 MG/DL (ref 8–23)
BUN/CREAT SERPL: 20.2 (ref 7–25)
CALCIUM SERPL-MCNC: 9.2 MG/DL (ref 8.6–10.5)
CASTS URNS MICRO: ABNORMAL
CHLORIDE SERPL-SCNC: 103 MMOL/L (ref 98–107)
CHOLEST SERPL-MCNC: 110 MG/DL (ref 0–200)
CO2 SERPL-SCNC: 28.9 MMOL/L (ref 22–29)
COLOR UR: ABNORMAL
CREAT SERPL-MCNC: 1.04 MG/DL (ref 0.76–1.27)
EGFRCR SERPLBLD CKD-EPI 2021: 76.8 ML/MIN/1.73
EOSINOPHIL # BLD AUTO: 0.13 10*3/MM3 (ref 0–0.4)
EOSINOPHIL NFR BLD AUTO: 2.3 % (ref 0.3–6.2)
EPI CELLS #/AREA URNS HPF: ABNORMAL /HPF
ERYTHROCYTE [DISTWIDTH] IN BLOOD BY AUTOMATED COUNT: 15.7 % (ref 12.3–15.4)
GLOBULIN SER CALC-MCNC: 2.4 GM/DL
GLUCOSE SERPL-MCNC: 127 MG/DL (ref 65–99)
GLUCOSE UR QL STRIP: NEGATIVE
HBA1C MFR BLD: 6.1 % (ref 4.8–5.6)
HCT VFR BLD AUTO: 43.2 % (ref 37.5–51)
HDLC SERPL-MCNC: 25 MG/DL (ref 40–60)
HGB BLD-MCNC: 14.1 G/DL (ref 13–17.7)
HGB UR QL STRIP: ABNORMAL
IMM GRANULOCYTES # BLD AUTO: 0.04 10*3/MM3 (ref 0–0.05)
IMM GRANULOCYTES NFR BLD AUTO: 0.7 % (ref 0–0.5)
KETONES UR QL STRIP: ABNORMAL
LDLC SERPL CALC-MCNC: 56 MG/DL (ref 0–100)
LDLC/HDLC SERPL: 2.06 {RATIO}
LEUKOCYTE ESTERASE UR QL STRIP: ABNORMAL
LYMPHOCYTES # BLD AUTO: 1.37 10*3/MM3 (ref 0.7–3.1)
LYMPHOCYTES NFR BLD AUTO: 24 % (ref 19.6–45.3)
MCH RBC QN AUTO: 28 PG (ref 26.6–33)
MCHC RBC AUTO-ENTMCNC: 32.6 G/DL (ref 31.5–35.7)
MCV RBC AUTO: 85.7 FL (ref 79–97)
MONOCYTES # BLD AUTO: 1.05 10*3/MM3 (ref 0.1–0.9)
MONOCYTES NFR BLD AUTO: 18.4 % (ref 5–12)
NEUTROPHILS # BLD AUTO: 3.08 10*3/MM3 (ref 1.7–7)
NEUTROPHILS NFR BLD AUTO: 53.7 % (ref 42.7–76)
NITRITE UR QL STRIP: NEGATIVE
NRBC BLD AUTO-RTO: 0 /100 WBC (ref 0–0.2)
PH UR STRIP: 6 [PH] (ref 5–8)
PLATELET # BLD AUTO: 236 10*3/MM3 (ref 140–450)
POTASSIUM SERPL-SCNC: 3.6 MMOL/L (ref 3.5–5.2)
PROT SERPL-MCNC: 7 G/DL (ref 6–8.5)
PROT UR QL STRIP: ABNORMAL
RBC # BLD AUTO: 5.04 10*6/MM3 (ref 4.14–5.8)
RBC #/AREA URNS HPF: ABNORMAL /HPF
SODIUM SERPL-SCNC: 142 MMOL/L (ref 136–145)
SP GR UR STRIP: 1.03 (ref 1–1.03)
TRIGL SERPL-MCNC: 168 MG/DL (ref 0–150)
UROBILINOGEN UR STRIP-MCNC: ABNORMAL MG/DL
VLDLC SERPL CALC-MCNC: 29 MG/DL (ref 5–40)
WBC # BLD AUTO: 5.72 10*3/MM3 (ref 3.4–10.8)
WBC #/AREA URNS HPF: ABNORMAL /HPF

## 2025-04-29 ENCOUNTER — OFFICE VISIT (OUTPATIENT)
Dept: FAMILY MEDICINE CLINIC | Facility: CLINIC | Age: 70
End: 2025-04-29
Payer: MEDICARE

## 2025-04-29 VITALS
SYSTOLIC BLOOD PRESSURE: 136 MMHG | HEART RATE: 70 BPM | TEMPERATURE: 98.2 F | BODY MASS INDEX: 30.42 KG/M2 | WEIGHT: 229.5 LBS | HEIGHT: 73 IN | DIASTOLIC BLOOD PRESSURE: 74 MMHG | RESPIRATION RATE: 16 BRPM | OXYGEN SATURATION: 99 %

## 2025-04-29 DIAGNOSIS — Z00.00 ENCOUNTER FOR SUBSEQUENT ANNUAL WELLNESS VISIT IN MEDICARE PATIENT: ICD-10-CM

## 2025-04-29 DIAGNOSIS — G47.33 OBSTRUCTIVE SLEEP APNEA: ICD-10-CM

## 2025-04-29 DIAGNOSIS — M25.562 ACUTE PAIN OF LEFT KNEE: ICD-10-CM

## 2025-04-29 DIAGNOSIS — R73.9 HYPERGLYCEMIA: ICD-10-CM

## 2025-04-29 DIAGNOSIS — E78.00 HYPERCHOLESTEROLEMIA: Primary | ICD-10-CM

## 2025-04-29 DIAGNOSIS — I10 BENIGN ESSENTIAL HYPERTENSION: ICD-10-CM

## 2025-04-29 NOTE — PROGRESS NOTES
Subjective   The ABCs of the Annual Wellness Visit  Medicare Wellness Visit      Jacob Miller is a 69 y.o. patient who presents for a Medicare Wellness Visit.    The following portions of the patient's history were reviewed and   updated as appropriate: allergies, current medications, past family history, past medical history, past social history, past surgical history, and problem list.    Compared to one year ago, the patient's physical   health is better.  Compared to one year ago, the patient's mental   health is the same.    Recent Hospitalizations:  This patient has had a [facility] admission record on file within the last 365 days.  Current Medical Providers:  Patient Care Team:  Erik Benitez MD as PCP - General (Internal Medicine)    Outpatient Medications Prior to Visit   Medication Sig Dispense Refill    Alcohol Swabs pads Clean area of injection as needed 3 times daily. 100 each 3    amLODIPine (NORVASC) 10 MG tablet Take 1 tablet by mouth Daily. 90 tablet 0    diclofenac (VOLTAREN) 75 MG EC tablet TAKE 1 TABLET BY MOUTH DAILY 30 tablet 1    lisinopril-hydrochlorothiazide (PRINZIDE,ZESTORETIC) 20-12.5 MG per tablet TAKE 2 TABLETS BY MOUTH EVERY MORNING 180 tablet 1    metoprolol succinate XL (TOPROL-XL) 50 MG 24 hr tablet TAKE 1 TABLET BY MOUTH DAILY 90 tablet 1    ondansetron ODT (ZOFRAN-ODT) 4 MG disintegrating tablet Place 1 tablet on the tongue Every 8 (Eight) Hours As Needed for Nausea or Vomiting.      OneTouch Delica Lancets 33G misc Check glucose 3 times daily 100 each 3    OneTouch Ultra test strip USE TO TEST FOR BLOOD SUGAR LEVELS ONCE DAILY AS DIRECTED 100 each 11    oxyCODONE-acetaminophen (PERCOCET) 7.5-325 MG per tablet Take 1 tablet by mouth Every 8 (Eight) Hours As Needed for Moderate Pain.      phenazopyridine (PYRIDIUM) 200 MG tablet Take 1 tablet by mouth 3 (Three) Times a Day As Needed for Bladder Spasms.      rosuvastatin (CRESTOR) 10 MG tablet TAKE 1 TABLET BY MOUTH  "DAILY 90 tablet 1    triamcinolone (KENALOG) 0.1 % cream        No facility-administered medications prior to visit.     Opioid medication/s are on active medication list.  and I have evaluated his active treatment plan and pain score trends (see table).  Vitals:    04/29/25 0934   PainSc: 0-No pain     I have reviewed the chart for potential of high risk medication and harmful drug interactions in the elderly.        Aspirin is not on active medication list.  Aspirin use is not indicated based on review of current medical condition/s. Risk of harm outweighs potential benefits.  .    Patient Active Problem List   Diagnosis    Benign essential hypertension    Hypercholesterolemia    Obstructive sleep apnea    Hyperglycemia    Actinic keratosis    Benign non-nodular prostatic hyperplasia without lower urinary tract symptoms    Osteoarthritis of both knees    Palpitations    Biceps tendinitis on left    Overweight (BMI 25.0-29.9)    Overactive bladder    Other male erectile dysfunction    Encounter for subsequent annual wellness visit in Medicare patient    I will check    Elevated PSA    Viral pharyngitis    Syncope    Left knee pain    Nonrheumatic aortic valve insufficiency     Advance Care Planning Advance Directive is not on file.  ACP discussion was held with the patient during this visit. Patient has an advance directive (not in EMR), copy requested.            Objective   Vitals:    04/29/25 0934   BP: 136/74   BP Location: Right arm   Patient Position: Sitting   Cuff Size: Adult   Pulse: 70   Resp: 16   Temp: 98.2 °F (36.8 °C)   TempSrc: Oral   SpO2: 99%   Weight: 104 kg (229 lb 8 oz)   Height: 185.4 cm (72.99\")   PainSc: 0-No pain       Estimated body mass index is 30.29 kg/m² as calculated from the following:    Height as of this encounter: 185.4 cm (72.99\").    Weight as of this encounter: 104 kg (229 lb 8 oz).                Does the patient have evidence of cognitive impairment? No  Lab Results "   Component Value Date    CHLPL 110 2025    TRIG 168 (H) 2025    HDL 25 (L) 2025    LDL 56 2025    VLDL 29 2025    HGBA1C 6.10 (H) 2025                                                                                                Health  Risk Assessment    Smoking Status:  Social History     Tobacco Use   Smoking Status Never    Passive exposure: Never   Smokeless Tobacco Never   Tobacco Comments    Daily caffeine use     Alcohol Consumption:  Social History     Substance and Sexual Activity   Alcohol Use Yes    Comment: occ       Fall Risk Screen  STEADI Fall Risk Assessment was completed, and patient is at LOW risk for falls.Assessment completed on:2025    Depression Screening   Little interest or pleasure in doing things? Not at all   Feeling down, depressed, or hopeless? Not at all   PHQ-2 Total Score 0      Health Habits and Functional and Cognitive Screenin/29/2025     9:31 AM   Functional & Cognitive Status   Do you have difficulty preparing food and eating? No   Do you have difficulty bathing yourself, getting dressed or grooming yourself? No   Do you have difficulty using the toilet? No   Do you have difficulty moving around from place to place? No   Do you have trouble with steps or getting out of a bed or a chair? No   Current Diet Unhealthy Diet   Dental Exam Up to date   Eye Exam Up to date   Exercise (times per week) 4 times per week   Current Exercises Include Walking;Yard Work   Do you need help using the phone?  No   Are you deaf or do you have serious difficulty hearing?  No   Do you need help to go to places out of walking distance? No   Do you need help shopping? No   Do you need help preparing meals?  No   Do you need help with housework?  No   Do you need help with laundry? No   Do you need help taking your medications? No   Do you need help managing money? No   Do you ever drive or ride in a car without wearing a seat belt? No   Have you felt  unusual stress, anger or loneliness in the last month? No   Who do you live with? Spouse   If you need help, do you have trouble finding someone available to you? No   Have you been bothered in the last four weeks by sexual problems? No   Do you have difficulty concentrating, remembering or making decisions? No           Age-appropriate Screening Schedule:  Refer to the list below for future screening recommendations based on patient's age, sex and/or medical conditions. Orders for these recommended tests are listed in the plan section. The patient has been provided with a written plan.    Health Maintenance List  Health Maintenance   Topic Date Due    DIABETIC FOOT EXAM  Never done    DIABETIC EYE EXAM  Never done    URINE MICROALBUMIN-CREATININE RATIO (uACR)  Never done    ANNUAL WELLNESS VISIT  12/28/2024    COVID-19 Vaccine (8 - 2024-25 season) 03/04/2025    INFLUENZA VACCINE  07/01/2025    HEMOGLOBIN A1C  10/24/2025    LIPID PANEL  04/24/2026    COLORECTAL CANCER SCREENING  01/24/2032    TDAP/TD VACCINES (3 - Td or Tdap) 11/08/2033    HEPATITIS C SCREENING  Completed    Pneumococcal Vaccine 50+  Completed    ZOSTER VACCINE  Completed                                                                                                                                                CMS Preventative Services Quick Reference  Risk Factors Identified During Encounter  None Identified    The above risks/problems have been discussed with the patient.  Pertinent information has been shared with the patient in the After Visit Summary.  An After Visit Summary and PPPS were made available to the patient.    Follow Up:   Next Medicare Wellness visit to be scheduled in 1 year.         Additional E&M Note during same encounter follows:  Patient has additional, significant, and separately identifiable condition(s)/problem(s) that require work above and beyond the Medicare Wellness Visit     Chief Complaint  Medicare  Wellness-subsequent    Subjective    Here today for Medicare wellness visit.      Jacob is also being seen today for additional medical problem/s.       The patient is a 69-year-old male who presents for evaluation of diabetes, hyperlipidemia, and health maintenance.    He reports a slight improvement in his overall health status compared to the previous year, with no significant changes in his emotional well-being. He is not currently on any anticoagulant therapy. He has an advanced directive in place. Regular ophthalmology appointments are maintained, and physical activities such as walking and yard work are performed. Alcohol consumption is moderate, with an intake of 2 to 3 drinks per week. He is currently retired and last traveled out of town with his wife to [place] in 11/2024 or 12/2024.    A CPAP machine is utilized for sleep apnea.    A history of prostate surgery is noted, and he is under the care of Dr. Watts, who conducts regular PSA tests. A follow-up appointment is scheduled with Dr. Watts at the end of this month, during which lab tests will be conducted.    Knee issues, including a meniscus tear following a fall, are reported. Cortisone injections and physical therapy were received, resulting in significant improvement in the knee condition.    Recent lab results indicate a blood sugar level of 127, with previous readings of 105, 168, 115, 152, and 165. Hemoglobin A1c is 6.1, showing good control of diabetes. Cholesterol levels are noted, with LDL at 56 and HDL at 25. The last PSA reading was 2.4 in 10/2024.    PAST SURGICAL HISTORY:  - Prostate surgery  - Cortisone injections for knee meniscus tear    SOCIAL HISTORY  He drinks 2-3 alcoholic drinks per week. He is currently retired.  Review of Systems   Constitutional: Negative.    HENT: Negative.     Respiratory: Negative.     Cardiovascular: Negative.    Musculoskeletal: Negative.    Psychiatric/Behavioral: Negative.            Objective   Vital  "Signs:  /74 (BP Location: Right arm, Patient Position: Sitting, Cuff Size: Adult)   Pulse 70   Temp 98.2 °F (36.8 °C) (Oral)   Resp 16   Ht 185.4 cm (72.99\")   Wt 104 kg (229 lb 8 oz)   SpO2 99%   BMI 30.29 kg/m²   Physical Exam  Vitals and nursing note reviewed.   Constitutional:       General: He is not in acute distress.     Appearance: Normal appearance. He is obese. He is not ill-appearing, toxic-appearing or diaphoretic.      Comments: Pleasant, neatly groomed, no distress.   HENT:      Head: Normocephalic.   Neck:      Vascular: No carotid bruit.   Cardiovascular:      Rate and Rhythm: Regular rhythm.      Heart sounds: Normal heart sounds. No murmur heard.     No gallop.   Pulmonary:      Effort: No respiratory distress.      Breath sounds: Normal breath sounds. No wheezing or rales.   Skin:     General: Skin is warm and dry.      Findings: No bruising, erythema, lesion or rash.   Neurological:      Mental Status: He is alert and oriented to person, place, and time.   Psychiatric:         Mood and Affect: Mood normal.         Behavior: Behavior normal.         Thought Content: Thought content normal.         Judgment: Judgment normal.           Neck: No carotid bruits  Respiratory: Clear to auscultation, no wheezing, rales or rhonchi            Results  Labs   - Blood sugar: 127   - Hemoglobin A1c: 6.1%   - LDL cholesterol: 56   - HDL cholesterol: 25   - PSA: 10/2024, 2.4           Assessment and Plan        1. Health maintenance.  - Vascular screening is due for an update, with the last one conducted in 2020.  - Cholesterol levels are within the optimal range, with an LDL of 56 and HDL of 25.  - No signs of cognitive impairment are present.  - Advised to bring a copy of the advanced directive during the next visit for inclusion in medical records. Encouraged to maintain physical activity and aim for a weight loss of approximately 10 pounds.    2. Diabetes Mellitus.  - Blood glucose levels have " been consistently elevated, indicating diabetes.  - Hemoglobin A1c levels suggest good control of the condition, with a current A1c of 6.1%.  - Advised to continue monitoring blood sugar levels and maintain a healthy lifestyle, including regular physical activity and a balanced diet.  - No medication prescribed at this time due to good control of A1c levels.    3. Hyperlipidemia.  - LDL cholesterol is well-controlled at 56 mg/dL, but HDL cholesterol is low at 25 mg/dL.  - Advised to continue with current physical activity and consider losing about 10 pounds to improve HDL levels.  - No additional medication or therapy prescribed at this time.    4. Sleep Apnea.  - Currently using a CPAP machine.  - No changes to the current management plan are necessary.  - Continued use of CPAP machine recommended.    5. Meniscus Tear.  - Reports significant improvement in left knee pain following physical therapy and cortisone shots.  - No further surgical intervention is required at this time.  - Encouraged to continue physical therapy exercises to maintain improvement.         Follow Up   No follow-ups on file.  Patient was given instructions and counseling regarding his condition or for health maintenance advice. Please see specific information pulled into the AVS if appropriate.  Patient or patient representative verbalized consent for the use of Ambient Listening during the visit with  Erik Benitez MD for chart documentation. 4/29/2025  09:58 EDT

## 2025-05-07 RX ORDER — METOPROLOL SUCCINATE 50 MG/1
50 TABLET, EXTENDED RELEASE ORAL DAILY
Qty: 90 TABLET | Refills: 1 | Status: SHIPPED | OUTPATIENT
Start: 2025-05-07

## 2025-06-15 DIAGNOSIS — M17.0 PRIMARY OSTEOARTHRITIS OF BOTH KNEES: ICD-10-CM

## 2025-06-15 DIAGNOSIS — M25.562 ACUTE PAIN OF LEFT KNEE: ICD-10-CM

## 2025-06-15 RX ORDER — DICLOFENAC SODIUM 75 MG/1
75 TABLET, DELAYED RELEASE ORAL DAILY
Qty: 30 TABLET | Refills: 1 | Status: SHIPPED | OUTPATIENT
Start: 2025-06-15

## 2025-06-15 RX ORDER — AMLODIPINE BESYLATE 10 MG/1
10 TABLET ORAL DAILY
Qty: 90 TABLET | Refills: 0 | Status: SHIPPED | OUTPATIENT
Start: 2025-06-15

## 2025-08-08 ENCOUNTER — APPOINTMENT (OUTPATIENT)
Dept: CT IMAGING | Facility: HOSPITAL | Age: 70
DRG: 399 | End: 2025-08-08
Payer: MEDICARE

## 2025-08-08 ENCOUNTER — ANESTHESIA (OUTPATIENT)
Dept: PERIOP | Facility: HOSPITAL | Age: 70
End: 2025-08-08
Payer: MEDICARE

## 2025-08-08 ENCOUNTER — HOSPITAL ENCOUNTER (INPATIENT)
Facility: HOSPITAL | Age: 70
LOS: 1 days | Discharge: HOME | DRG: 399 | End: 2025-08-09
Attending: EMERGENCY MEDICINE | Admitting: SURGERY
Payer: MEDICARE

## 2025-08-08 ENCOUNTER — APPOINTMENT (OUTPATIENT)
Dept: GENERAL RADIOLOGY | Facility: HOSPITAL | Age: 70
DRG: 399 | End: 2025-08-08
Payer: MEDICARE

## 2025-08-08 ENCOUNTER — ANESTHESIA EVENT (OUTPATIENT)
Dept: PERIOP | Facility: HOSPITAL | Age: 70
End: 2025-08-08
Payer: MEDICARE

## 2025-08-08 PROBLEM — K37 APPENDICITIS: Status: ACTIVE | Noted: 2025-08-08

## 2025-08-08 PROBLEM — K35.30 ACUTE APPENDICITIS WITH LOCALIZED PERITONITIS: Status: ACTIVE | Noted: 2025-08-08

## 2025-08-08 PROCEDURE — 25010000002 KETOROLAC TROMETHAMINE PER 15 MG: Performed by: NURSE ANESTHETIST, CERTIFIED REGISTERED

## 2025-08-08 PROCEDURE — 25010000002 DEXAMETHASONE SODIUM PHOSPHATE 20 MG/5ML SOLUTION: Performed by: NURSE ANESTHETIST, CERTIFIED REGISTERED

## 2025-08-08 PROCEDURE — 25010000002 PROPOFOL 200 MG/20ML EMULSION: Performed by: NURSE ANESTHETIST, CERTIFIED REGISTERED

## 2025-08-08 PROCEDURE — 25010000002 LIDOCAINE 2% SOLUTION: Performed by: NURSE ANESTHETIST, CERTIFIED REGISTERED

## 2025-08-08 PROCEDURE — 25010000002 DROPERIDOL PER 5 MG: Performed by: ANESTHESIOLOGY

## 2025-08-08 PROCEDURE — 25010000002 SUGAMMADEX 200 MG/2ML SOLUTION: Performed by: NURSE ANESTHETIST, CERTIFIED REGISTERED

## 2025-08-08 PROCEDURE — 25010000002 HYDROMORPHONE 1 MG/ML SOLUTION: Performed by: NURSE ANESTHETIST, CERTIFIED REGISTERED

## 2025-08-08 PROCEDURE — 25810000003 LACTATED RINGERS PER 1000 ML: Performed by: ANESTHESIOLOGY

## 2025-08-08 RX ORDER — SUCCINYLCHOLINE/SOD CL,ISO/PF 200MG/10ML
SYRINGE (ML) INTRAVENOUS AS NEEDED
Status: DISCONTINUED | OUTPATIENT
Start: 2025-08-08 | End: 2025-08-08 | Stop reason: SURG

## 2025-08-08 RX ORDER — PROPOFOL 10 MG/ML
INJECTION, EMULSION INTRAVENOUS AS NEEDED
Status: DISCONTINUED | OUTPATIENT
Start: 2025-08-08 | End: 2025-08-08 | Stop reason: SURG

## 2025-08-08 RX ORDER — KETOROLAC TROMETHAMINE 30 MG/ML
INJECTION, SOLUTION INTRAMUSCULAR; INTRAVENOUS AS NEEDED
Status: DISCONTINUED | OUTPATIENT
Start: 2025-08-08 | End: 2025-08-08 | Stop reason: SURG

## 2025-08-08 RX ORDER — LIDOCAINE HYDROCHLORIDE 20 MG/ML
INJECTION, SOLUTION INFILTRATION; PERINEURAL AS NEEDED
Status: DISCONTINUED | OUTPATIENT
Start: 2025-08-08 | End: 2025-08-08 | Stop reason: SURG

## 2025-08-08 RX ORDER — DROPERIDOL 2.5 MG/ML
INJECTION, SOLUTION INTRAMUSCULAR; INTRAVENOUS AS NEEDED
Status: DISCONTINUED | OUTPATIENT
Start: 2025-08-08 | End: 2025-08-08 | Stop reason: SURG

## 2025-08-08 RX ORDER — ROCURONIUM BROMIDE 10 MG/ML
INJECTION, SOLUTION INTRAVENOUS AS NEEDED
Status: DISCONTINUED | OUTPATIENT
Start: 2025-08-08 | End: 2025-08-08 | Stop reason: SURG

## 2025-08-08 RX ORDER — DEXAMETHASONE SODIUM PHOSPHATE 4 MG/ML
INJECTION, SOLUTION INTRA-ARTICULAR; INTRALESIONAL; INTRAMUSCULAR; INTRAVENOUS; SOFT TISSUE AS NEEDED
Status: DISCONTINUED | OUTPATIENT
Start: 2025-08-08 | End: 2025-08-08 | Stop reason: SURG

## 2025-08-08 RX ORDER — BUPIVACAINE HCL/0.9 % NACL/PF 0.125 %
PLASTIC BAG, INJECTION (ML) EPIDURAL AS NEEDED
Status: DISCONTINUED | OUTPATIENT
Start: 2025-08-08 | End: 2025-08-08 | Stop reason: SURG

## 2025-08-08 RX ADMIN — SUGAMMADEX 200 MG: 100 INJECTION, SOLUTION INTRAVENOUS at 18:47

## 2025-08-08 RX ADMIN — KETOROLAC TROMETHAMINE 30 MG: 30 INJECTION INTRAMUSCULAR; INTRAVENOUS at 18:47

## 2025-08-08 RX ADMIN — SODIUM CHLORIDE, POTASSIUM CHLORIDE, SODIUM LACTATE AND CALCIUM CHLORIDE: 600; 310; 30; 20 INJECTION, SOLUTION INTRAVENOUS at 18:54

## 2025-08-08 RX ADMIN — Medication 100 MCG: at 18:18

## 2025-08-08 RX ADMIN — LIDOCAINE HYDROCHLORIDE 60 MG: 20 INJECTION, SOLUTION INFILTRATION; PERINEURAL at 18:10

## 2025-08-08 RX ADMIN — Medication 180 MG: at 18:10

## 2025-08-08 RX ADMIN — PROPOFOL 200 MG: 10 INJECTION, EMULSION INTRAVENOUS at 18:10

## 2025-08-08 RX ADMIN — DEXAMETHASONE SODIUM PHOSPHATE 8 MG: 4 INJECTION, SOLUTION INTRAMUSCULAR; INTRAVENOUS at 18:12

## 2025-08-08 RX ADMIN — DROPERIDOL 1.25 MG: 2.5 INJECTION, SOLUTION INTRAMUSCULAR; INTRAVENOUS at 18:05

## 2025-08-08 RX ADMIN — ROCURONIUM BROMIDE 50 MG: 10 INJECTION, SOLUTION INTRAVENOUS at 18:19

## 2025-08-08 RX ADMIN — HYDROMORPHONE HYDROCHLORIDE 1 MG: 1 INJECTION, SOLUTION INTRAMUSCULAR; INTRAVENOUS; SUBCUTANEOUS at 18:10

## 2025-08-11 ENCOUNTER — TELEPHONE (OUTPATIENT)
Dept: SURGERY | Facility: CLINIC | Age: 70
End: 2025-08-11
Payer: MEDICARE

## 2025-08-11 ENCOUNTER — READMISSION MANAGEMENT (OUTPATIENT)
Dept: CALL CENTER | Facility: HOSPITAL | Age: 70
End: 2025-08-11
Payer: MEDICARE

## 2025-08-11 ENCOUNTER — TRANSITIONAL CARE MANAGEMENT TELEPHONE ENCOUNTER (OUTPATIENT)
Dept: CALL CENTER | Facility: HOSPITAL | Age: 70
End: 2025-08-11
Payer: MEDICARE

## 2025-08-12 ENCOUNTER — TRANSITIONAL CARE MANAGEMENT TELEPHONE ENCOUNTER (OUTPATIENT)
Dept: CALL CENTER | Facility: HOSPITAL | Age: 70
End: 2025-08-12
Payer: MEDICARE

## 2025-08-14 ENCOUNTER — OFFICE VISIT (OUTPATIENT)
Dept: SURGERY | Facility: CLINIC | Age: 70
End: 2025-08-14
Payer: MEDICARE

## 2025-08-14 VITALS
WEIGHT: 222 LBS | BODY MASS INDEX: 29.42 KG/M2 | OXYGEN SATURATION: 98 % | HEART RATE: 63 BPM | SYSTOLIC BLOOD PRESSURE: 138 MMHG | DIASTOLIC BLOOD PRESSURE: 78 MMHG | HEIGHT: 73 IN

## 2025-08-14 DIAGNOSIS — Z48.89 POSTOPERATIVE VISIT: Primary | ICD-10-CM

## 2025-08-20 ENCOUNTER — OFFICE VISIT (OUTPATIENT)
Dept: FAMILY MEDICINE CLINIC | Facility: CLINIC | Age: 70
End: 2025-08-20
Payer: MEDICARE

## 2025-08-20 VITALS
OXYGEN SATURATION: 96 % | WEIGHT: 222.5 LBS | HEIGHT: 73 IN | RESPIRATION RATE: 16 BRPM | TEMPERATURE: 98.1 F | DIASTOLIC BLOOD PRESSURE: 70 MMHG | BODY MASS INDEX: 29.49 KG/M2 | SYSTOLIC BLOOD PRESSURE: 130 MMHG | HEART RATE: 62 BPM

## 2025-08-20 DIAGNOSIS — Z87.19 HISTORY OF APPENDICITIS: ICD-10-CM

## 2025-08-20 DIAGNOSIS — Z90.49 S/P APPENDECTOMY: ICD-10-CM

## 2025-08-20 DIAGNOSIS — K76.89 LIVER CYST: ICD-10-CM

## 2025-08-20 DIAGNOSIS — Q61.02 MULTIPLE RENAL CYSTS: Primary | ICD-10-CM

## 2025-08-20 DIAGNOSIS — K86.2 PANCREATIC CYST: ICD-10-CM

## 2025-08-20 RX ORDER — OXYCODONE AND ACETAMINOPHEN 7.5; 325 MG/1; MG/1
TABLET ORAL
COMMUNITY

## 2025-08-20 RX ORDER — SIMVASTATIN 40 MG
TABLET ORAL
COMMUNITY
End: 2025-08-20

## 2025-08-20 RX ORDER — ONDANSETRON 4 MG/1
TABLET, ORALLY DISINTEGRATING ORAL
COMMUNITY

## 2025-08-20 RX ORDER — AMLODIPINE BESYLATE 5 MG/1
TABLET ORAL
COMMUNITY
End: 2025-08-20

## 2025-08-21 PROBLEM — Z87.19 HISTORY OF APPENDICITIS: Status: ACTIVE | Noted: 2025-08-21

## 2025-08-21 PROBLEM — Z90.49 S/P APPENDECTOMY: Status: ACTIVE | Noted: 2025-08-21

## 2025-08-23 DIAGNOSIS — M17.0 PRIMARY OSTEOARTHRITIS OF BOTH KNEES: ICD-10-CM

## 2025-08-23 DIAGNOSIS — M25.562 ACUTE PAIN OF LEFT KNEE: ICD-10-CM

## 2025-08-26 RX ORDER — DICLOFENAC SODIUM 75 MG/1
75 TABLET, DELAYED RELEASE ORAL DAILY
Qty: 30 TABLET | Refills: 1 | Status: SHIPPED | OUTPATIENT
Start: 2025-08-26